# Patient Record
Sex: FEMALE
[De-identification: names, ages, dates, MRNs, and addresses within clinical notes are randomized per-mention and may not be internally consistent; named-entity substitution may affect disease eponyms.]

---

## 2017-11-24 ENCOUNTER — APPOINTMENT (OUTPATIENT)
Dept: ELECTROPHYSIOLOGY | Facility: CLINIC | Age: 82
End: 2017-11-24
Payer: MEDICARE

## 2017-11-24 ENCOUNTER — NON-APPOINTMENT (OUTPATIENT)
Age: 82
End: 2017-11-24

## 2017-11-24 VITALS
DIASTOLIC BLOOD PRESSURE: 82 MMHG | HEART RATE: 66 BPM | BODY MASS INDEX: 22.08 KG/M2 | HEIGHT: 62 IN | OXYGEN SATURATION: 98 % | WEIGHT: 120 LBS | SYSTOLIC BLOOD PRESSURE: 178 MMHG

## 2017-11-24 DIAGNOSIS — Z86.69 PERSONAL HISTORY OF OTHER DISEASES OF THE NERVOUS SYSTEM AND SENSE ORGANS: ICD-10-CM

## 2017-11-24 DIAGNOSIS — I35.0 NONRHEUMATIC AORTIC (VALVE) STENOSIS: ICD-10-CM

## 2017-11-24 DIAGNOSIS — I25.10 ATHEROSCLEROTIC HEART DISEASE OF NATIVE CORONARY ARTERY W/OUT ANGINA PECTORIS: ICD-10-CM

## 2017-11-24 DIAGNOSIS — I10 ESSENTIAL (PRIMARY) HYPERTENSION: ICD-10-CM

## 2017-11-24 DIAGNOSIS — Z82.49 FAMILY HISTORY OF ISCHEMIC HEART DISEASE AND OTHER DISEASES OF THE CIRCULATORY SYSTEM: ICD-10-CM

## 2017-11-24 DIAGNOSIS — R00.2 PALPITATIONS: ICD-10-CM

## 2017-11-24 DIAGNOSIS — E78.00 PURE HYPERCHOLESTEROLEMIA, UNSPECIFIED: ICD-10-CM

## 2017-11-24 DIAGNOSIS — E03.9 HYPOTHYROIDISM, UNSPECIFIED: ICD-10-CM

## 2017-11-24 DIAGNOSIS — Z78.9 OTHER SPECIFIED HEALTH STATUS: ICD-10-CM

## 2017-11-24 DIAGNOSIS — I07.1 RHEUMATIC TRICUSPID INSUFFICIENCY: ICD-10-CM

## 2017-11-24 DIAGNOSIS — I34.1 NONRHEUMATIC MITRAL (VALVE) PROLAPSE: ICD-10-CM

## 2017-11-24 DIAGNOSIS — I48.91 UNSPECIFIED ATRIAL FIBRILLATION: ICD-10-CM

## 2017-11-24 PROCEDURE — 99205 OFFICE O/P NEW HI 60 MIN: CPT

## 2017-11-24 PROCEDURE — 93000 ELECTROCARDIOGRAM COMPLETE: CPT

## 2017-11-24 RX ORDER — EZETIMIBE 10 MG/1
10 TABLET ORAL
Qty: 90 | Refills: 0 | Status: ACTIVE | COMMUNITY
Start: 2017-06-13

## 2017-11-24 RX ORDER — SERTRALINE HYDROCHLORIDE 100 MG/1
100 TABLET, FILM COATED ORAL
Qty: 90 | Refills: 0 | Status: ACTIVE | COMMUNITY
Start: 2017-08-14

## 2017-11-24 RX ORDER — ATORVASTATIN CALCIUM 40 MG/1
40 TABLET, FILM COATED ORAL
Qty: 90 | Refills: 0 | Status: ACTIVE | COMMUNITY
Start: 2017-07-12

## 2017-11-24 RX ORDER — ALPRAZOLAM 0.25 MG/1
0.25 TABLET ORAL
Qty: 60 | Refills: 0 | Status: ACTIVE | COMMUNITY
Start: 2017-08-14

## 2017-11-24 RX ORDER — VALSARTAN AND HYDROCHLOROTHIAZIDE 160; 12.5 MG/1; MG/1
160-12.5 TABLET, FILM COATED ORAL
Qty: 90 | Refills: 0 | Status: ACTIVE | COMMUNITY
Start: 2017-03-20

## 2017-11-24 RX ORDER — LEVOTHYROXINE SODIUM 0.07 MG/1
75 TABLET ORAL
Qty: 90 | Refills: 0 | Status: ACTIVE | COMMUNITY
Start: 2017-10-09

## 2017-11-24 RX ORDER — RIVAROXABAN 20 MG/1
20 TABLET, FILM COATED ORAL
Qty: 90 | Refills: 0 | Status: ACTIVE | COMMUNITY
Start: 2017-11-06

## 2017-11-24 RX ORDER — AMIODARONE HYDROCHLORIDE 200 MG/1
200 TABLET ORAL
Qty: 68 | Refills: 0 | Status: ACTIVE | COMMUNITY
Start: 2017-02-23

## 2017-11-24 RX ORDER — METOPROLOL SUCCINATE 25 MG/1
25 TABLET, EXTENDED RELEASE ORAL DAILY
Qty: 45 | Refills: 0 | Status: ACTIVE | COMMUNITY
Start: 2017-07-12

## 2018-07-27 PROBLEM — Z78.9 ALCOHOL USE: Status: ACTIVE | Noted: 2017-11-24

## 2024-05-11 ENCOUNTER — TRANSCRIPTION ENCOUNTER (OUTPATIENT)
Age: 89
End: 2024-05-11

## 2024-07-08 ENCOUNTER — INPATIENT (INPATIENT)
Facility: HOSPITAL | Age: 89
LOS: 6 days | Discharge: NURSING, REHAB OR LONG-TERM CARE | DRG: 179 | End: 2024-07-15
Attending: STUDENT IN AN ORGANIZED HEALTH CARE EDUCATION/TRAINING PROGRAM | Admitting: STUDENT IN AN ORGANIZED HEALTH CARE EDUCATION/TRAINING PROGRAM
Payer: MEDICARE

## 2024-07-08 VITALS
HEART RATE: 74 BPM | HEIGHT: 60 IN | OXYGEN SATURATION: 96 % | DIASTOLIC BLOOD PRESSURE: 75 MMHG | TEMPERATURE: 98 F | RESPIRATION RATE: 20 BRPM | WEIGHT: 100.09 LBS | SYSTOLIC BLOOD PRESSURE: 124 MMHG

## 2024-07-08 LAB
ALBUMIN SERPL ELPH-MCNC: 4.3 G/DL — SIGNIFICANT CHANGE UP (ref 3.3–5)
ALP SERPL-CCNC: 95 U/L — SIGNIFICANT CHANGE UP (ref 40–120)
ALT FLD-CCNC: 13 U/L — SIGNIFICANT CHANGE UP (ref 10–45)
ANION GAP SERPL CALC-SCNC: 14 MMOL/L — SIGNIFICANT CHANGE UP (ref 5–17)
APPEARANCE UR: CLEAR — SIGNIFICANT CHANGE UP
APTT BLD: 35.7 SEC — HIGH (ref 24.5–35.6)
AST SERPL-CCNC: 20 U/L — SIGNIFICANT CHANGE UP (ref 10–40)
BACTERIA # UR AUTO: NEGATIVE /HPF — SIGNIFICANT CHANGE UP
BASE EXCESS BLDV CALC-SCNC: 2.8 MMOL/L — SIGNIFICANT CHANGE UP (ref -2–3)
BASOPHILS # BLD AUTO: 0.02 K/UL — SIGNIFICANT CHANGE UP (ref 0–0.2)
BASOPHILS NFR BLD AUTO: 0.2 % — SIGNIFICANT CHANGE UP (ref 0–2)
BILIRUB SERPL-MCNC: 0.6 MG/DL — SIGNIFICANT CHANGE UP (ref 0.2–1.2)
BILIRUB UR-MCNC: NEGATIVE — SIGNIFICANT CHANGE UP
BUN SERPL-MCNC: 18 MG/DL — SIGNIFICANT CHANGE UP (ref 7–23)
CA-I SERPL-SCNC: 1.21 MMOL/L — SIGNIFICANT CHANGE UP (ref 1.15–1.33)
CALCIUM SERPL-MCNC: 10.1 MG/DL — SIGNIFICANT CHANGE UP (ref 8.4–10.5)
CAST: 0 /LPF — SIGNIFICANT CHANGE UP (ref 0–4)
CHLORIDE BLDV-SCNC: 103 MMOL/L — SIGNIFICANT CHANGE UP (ref 96–108)
CHLORIDE SERPL-SCNC: 101 MMOL/L — SIGNIFICANT CHANGE UP (ref 96–108)
CO2 BLDV-SCNC: 30 MMOL/L — HIGH (ref 22–26)
CO2 SERPL-SCNC: 24 MMOL/L — SIGNIFICANT CHANGE UP (ref 22–31)
COLOR SPEC: YELLOW — SIGNIFICANT CHANGE UP
CREAT SERPL-MCNC: 1.06 MG/DL — SIGNIFICANT CHANGE UP (ref 0.5–1.3)
DIFF PNL FLD: NEGATIVE — SIGNIFICANT CHANGE UP
EGFR: 51 ML/MIN/1.73M2 — LOW
EOSINOPHIL # BLD AUTO: 0.01 K/UL — SIGNIFICANT CHANGE UP (ref 0–0.5)
EOSINOPHIL NFR BLD AUTO: 0.1 % — SIGNIFICANT CHANGE UP (ref 0–6)
FLUAV AG NPH QL: SIGNIFICANT CHANGE UP
FLUBV AG NPH QL: SIGNIFICANT CHANGE UP
GAS PNL BLDV: 136 MMOL/L — SIGNIFICANT CHANGE UP (ref 136–145)
GAS PNL BLDV: SIGNIFICANT CHANGE UP
GLUCOSE BLDV-MCNC: 99 MG/DL — SIGNIFICANT CHANGE UP (ref 70–99)
GLUCOSE SERPL-MCNC: 107 MG/DL — HIGH (ref 70–99)
GLUCOSE UR QL: NEGATIVE MG/DL — SIGNIFICANT CHANGE UP
HCO3 BLDV-SCNC: 28 MMOL/L — SIGNIFICANT CHANGE UP (ref 22–29)
HCT VFR BLD CALC: 39.8 % — SIGNIFICANT CHANGE UP (ref 34.5–45)
HCT VFR BLDA CALC: 39 % — SIGNIFICANT CHANGE UP (ref 34.5–46.5)
HGB BLD CALC-MCNC: 13.1 G/DL — SIGNIFICANT CHANGE UP (ref 11.7–16.1)
HGB BLD-MCNC: 12.8 G/DL — SIGNIFICANT CHANGE UP (ref 11.5–15.5)
IMM GRANULOCYTES NFR BLD AUTO: 0.5 % — SIGNIFICANT CHANGE UP (ref 0–0.9)
INR BLD: 1.09 RATIO — SIGNIFICANT CHANGE UP (ref 0.85–1.18)
KETONES UR-MCNC: ABNORMAL MG/DL
LACTATE BLDV-MCNC: 1.1 MMOL/L — SIGNIFICANT CHANGE UP (ref 0.5–2)
LEUKOCYTE ESTERASE UR-ACNC: ABNORMAL
LYMPHOCYTES # BLD AUTO: 0.57 K/UL — LOW (ref 1–3.3)
LYMPHOCYTES # BLD AUTO: 6.9 % — LOW (ref 13–44)
MCHC RBC-ENTMCNC: 32 PG — SIGNIFICANT CHANGE UP (ref 27–34)
MCHC RBC-ENTMCNC: 32.2 GM/DL — SIGNIFICANT CHANGE UP (ref 32–36)
MCV RBC AUTO: 99.5 FL — SIGNIFICANT CHANGE UP (ref 80–100)
MONOCYTES # BLD AUTO: 0.51 K/UL — SIGNIFICANT CHANGE UP (ref 0–0.9)
MONOCYTES NFR BLD AUTO: 6.2 % — SIGNIFICANT CHANGE UP (ref 2–14)
NEUTROPHILS # BLD AUTO: 7.06 K/UL — SIGNIFICANT CHANGE UP (ref 1.8–7.4)
NEUTROPHILS NFR BLD AUTO: 86.1 % — HIGH (ref 43–77)
NITRITE UR-MCNC: NEGATIVE — SIGNIFICANT CHANGE UP
NRBC # BLD: 0 /100 WBCS — SIGNIFICANT CHANGE UP (ref 0–0)
PCO2 BLDV: 47 MMHG — HIGH (ref 39–42)
PH BLDV: 7.39 — SIGNIFICANT CHANGE UP (ref 7.32–7.43)
PH UR: 6 — SIGNIFICANT CHANGE UP (ref 5–8)
PLATELET # BLD AUTO: 123 K/UL — LOW (ref 150–400)
PO2 BLDV: 26 MMHG — SIGNIFICANT CHANGE UP (ref 25–45)
POTASSIUM BLDV-SCNC: 4.2 MMOL/L — SIGNIFICANT CHANGE UP (ref 3.5–5.1)
POTASSIUM SERPL-MCNC: 4.1 MMOL/L — SIGNIFICANT CHANGE UP (ref 3.5–5.3)
POTASSIUM SERPL-SCNC: 4.1 MMOL/L — SIGNIFICANT CHANGE UP (ref 3.5–5.3)
PROT SERPL-MCNC: 7.6 G/DL — SIGNIFICANT CHANGE UP (ref 6–8.3)
PROT UR-MCNC: SIGNIFICANT CHANGE UP MG/DL
PROTHROM AB SERPL-ACNC: 11.4 SEC — SIGNIFICANT CHANGE UP (ref 9.5–13)
RBC # BLD: 4 M/UL — SIGNIFICANT CHANGE UP (ref 3.8–5.2)
RBC # FLD: 12 % — SIGNIFICANT CHANGE UP (ref 10.3–14.5)
RBC CASTS # UR COMP ASSIST: 13 /HPF — HIGH (ref 0–4)
REVIEW: SIGNIFICANT CHANGE UP
RSV RNA NPH QL NAA+NON-PROBE: SIGNIFICANT CHANGE UP
SAO2 % BLDV: 39.2 % — LOW (ref 67–88)
SARS-COV-2 RNA SPEC QL NAA+PROBE: DETECTED
SODIUM SERPL-SCNC: 139 MMOL/L — SIGNIFICANT CHANGE UP (ref 135–145)
SP GR SPEC: 1.03 — SIGNIFICANT CHANGE UP (ref 1–1.03)
SQUAMOUS # UR AUTO: 2 /HPF — SIGNIFICANT CHANGE UP (ref 0–5)
UROBILINOGEN FLD QL: 0.2 MG/DL — SIGNIFICANT CHANGE UP (ref 0.2–1)
WBC # BLD: 8.21 K/UL — SIGNIFICANT CHANGE UP (ref 3.8–10.5)
WBC # FLD AUTO: 8.21 K/UL — SIGNIFICANT CHANGE UP (ref 3.8–10.5)
WBC UR QL: 3 /HPF — SIGNIFICANT CHANGE UP (ref 0–5)

## 2024-07-08 RX ORDER — PIPERACILLIN SODIUM AND TAZOBACTAM SODIUM 3; .375 G/15ML; G/15ML
3.38 INJECTION, POWDER, LYOPHILIZED, FOR SOLUTION INTRAVENOUS ONCE
Refills: 0 | Status: COMPLETED | OUTPATIENT
Start: 2024-07-08 | End: 2024-07-08

## 2024-07-08 RX ORDER — REMDESIVIR 5 MG/ML
100 INJECTION INTRAVENOUS EVERY 24 HOURS
Refills: 0 | Status: COMPLETED | OUTPATIENT
Start: 2024-07-10 | End: 2024-07-13

## 2024-07-08 RX ORDER — SODIUM CHLORIDE 0.9 % (FLUSH) 0.9 %
500 SYRINGE (ML) INJECTION ONCE
Refills: 0 | Status: COMPLETED | OUTPATIENT
Start: 2024-07-08 | End: 2024-07-08

## 2024-07-08 RX ORDER — REMDESIVIR 5 MG/ML
200 INJECTION INTRAVENOUS EVERY 24 HOURS
Refills: 0 | Status: COMPLETED | OUTPATIENT
Start: 2024-07-09 | End: 2024-07-09

## 2024-07-08 RX ORDER — ACETAMINOPHEN 325 MG
675 TABLET ORAL ONCE
Refills: 0 | Status: COMPLETED | OUTPATIENT
Start: 2024-07-08 | End: 2024-07-08

## 2024-07-08 RX ORDER — VANCOMYCIN HYDROCHLORIDE 50 MG/ML
1000 KIT ORAL ONCE
Refills: 0 | Status: COMPLETED | OUTPATIENT
Start: 2024-07-08 | End: 2024-07-08

## 2024-07-08 RX ORDER — REMDESIVIR 5 MG/ML
INJECTION INTRAVENOUS
Refills: 0 | Status: COMPLETED | OUTPATIENT
Start: 2024-07-09 | End: 2024-07-13

## 2024-07-08 RX ADMIN — Medication 500 MILLILITER(S): at 20:52

## 2024-07-08 RX ADMIN — Medication 270 MILLIGRAM(S): at 21:10

## 2024-07-08 RX ADMIN — PIPERACILLIN SODIUM AND TAZOBACTAM SODIUM 200 GRAM(S): 3; .375 INJECTION, POWDER, LYOPHILIZED, FOR SOLUTION INTRAVENOUS at 20:52

## 2024-07-08 RX ADMIN — VANCOMYCIN HYDROCHLORIDE 250 MILLIGRAM(S): KIT at 21:54

## 2024-07-08 NOTE — ED PROVIDER NOTE - ATTENDING CONTRIBUTION TO CARE
Attending MD Pisano:  I have seen and examined this patient and fully participated in the care of this patient as the teaching attending. I personally made/approved the management plan and take responsibility for the patient management.      89-year-old woman with a history of cognitive dysfunction, A-fib on Xarelto, CHF on furosemide, hypertension presenting from the [facility] living [facility] for evaluation of altered mental status and vomiting.  History is limited by patient's altered mental status.  Patient was first seen with granddaughter at bedside who states on a good day patient can have a conversation but has have some cognitive dysfunction, she states her grandmother is quite altered currently.  Remainder of history unobtainable from patient due to altered mental status.    Patient's vital signs are notable for rectal temperature 102 otherwise nonactionable.  Room air saturation off of oxygen 94%, patient opens eyes to voice and follows some commands but then closes her eyes and goes back to sleep.  She does not follow commands otherwise.  Head is atraumatic.  Occasional rales in the left anterior chest wall extremities are slightly cool to the touch.  Distal pulses palpable.  The abdomen is soft nondistended, no obvious evident focal tenderness to palpation.  Patient moves all extremity spontaneously but does not participate in nuanced neurologic exam.  Pupils are symmetric 4 mm to 2 mm bilaterally.    Patient is presenting for evaluation of altered mental status found to be febrile, differential includes sepsis from urinary infection or respiratory source.  Plan for panculture urinalysis chest x-ray, given anticoagulated will also obtain screening CT head to exclude IPH.      *The above represents an initial assessment/impression. Please refer to progress notes for potential changes in patient clinical course*

## 2024-07-08 NOTE — ED PROVIDER NOTE - CLINICAL SUMMARY MEDICAL DECISION MAKING FREE TEXT BOX
89-year-old female past medical history of cognitive function, A-fib on Xarelto, CHF with furosemide, currently being on the [place] since this past Wednesday presenting to the emergency department due to altered mental status.  Per daughter the patient has waxing waning mental status but is usually conversational.  Daughter states the patient is responsive to her name, but her cognitive function is decreased from previous.  She was noted to have a fever of 102 in the [place] and an oxygen saturation of 94% for which she was placed on oxygen.  Patient also had 1 episode of vomiting.  Denies cough, congestion, diarrhea, blood in stool.    On exam the patient will open her eyes to name and has crackles to lower lobes bilaterally.  No abdominal tenderness to palpation.  Concerns for sepsis.  Will order septic workup to evaluate for any signs of URI, pneumonia, or UTI.

## 2024-07-08 NOTE — ED ADULT NURSE NOTE - NSFALLHARMRISKINTERV_ED_ALL_ED
Assistance OOB with selected safe patient handling equipment if applicable/Assistance with ambulation/Communicate risk of Fall with Harm to all staff, patient, and family/Monitor gait and stability/Monitor for mental status changes and reorient to person, place, and time, as needed/Provide visual cue: red socks, yellow wristband, yellow gown, etc/Reinforce activity limits and safety measures with patient and family/Toileting schedule using arm’s reach rule for commode and bathroom/Use of alarms - bed, stretcher, chair and/or video monitoring/Bed in lowest position, wheels locked, appropriate side rails in place/Call bell, personal items and telephone in reach/Instruct patient to call for assistance before getting out of bed/chair/stretcher/Non-slip footwear applied when patient is off stretcher/[place] to call system/Physically safe environment - no spills, clutter or unnecessary equipment/Purposeful Proactive Rounding/Room/bathroom lighting operational, light cord in reach

## 2024-07-08 NOTE — ED PROVIDER NOTE - PHYSICAL EXAMINATION
Zac Cardona MD (PGY2)   Physical Exam:    Gen: NAD, Opens eyes to name  Head: NCAT  Lung: crakles to the lower lobes bilaterally  CV: RRR, no murmurs, rubs or gallops  Abd: soft, NT, ND, no guarding, no rigidity, no rebound tenderness, no CVA tenderness   MSK: no visible deformities, ROM normal in UE/LE, no back pain  Neuro: No focal sensory or motor deficits  Skin: Warm, well perfused, no rash, no leg swelling  Psych: normal affect, calm

## 2024-07-08 NOTE — ED ADULT NURSE NOTE - OBJECTIVE STATEMENT
89y/o female presents to ED via EMS, c/o N/V. Per family at bedside, EMS reported patient had episode of nausea and vomiting today at nursing home. Also was found to have low BP and low oxygen saturation. RN did not speak with EMS who left prior to RN entering patient's room. On exam, patient disoriented, moaning in bed. Unable to answer questions or provide medical history. Patient with bruising noted to right wrist. Found to be febrile to 102F rectally. Patient changed into gown and IV placed. Placed on CM. EKG done. MD Pisano at bedside for eval

## 2024-07-08 NOTE — ED PROVIDER NOTE - OTHER FINDINGS
ECG recorded at 8:29 PM independently interpreted by me , Dr Warren Pisano,  at 8:44 PM shows atrial fibrillation borderline right axis deviation right bundle branch block T wave inversions lead II lead III lead aVF, no ST elevation, no ST depression.  No acute diagnostic ischemic findings no prior ECGs for comparison.

## 2024-07-08 NOTE — ED PROVIDER NOTE - OBJECTIVE STATEMENT
89-year-old female past medical history of cognitive function, A-fib on Xarelto, CHF with furosemide, currently being on the [place] since this past Wednesday presenting to the emergency department due to altered mental status.  Per daughter the patient has waxing waning mental status but is usually conversational.  Daughter states the patient is responsive to her name, but her cognitive function is decreased from previous.  She was noted to have a fever of 102 in the [place] and an oxygen saturation of 94% for which she was placed on oxygen.  Patient also had 1 episode of vomiting.  Denies cough, congestion, diarrhea, blood in stool.

## 2024-07-08 NOTE — ED ADULT NURSE REASSESSMENT NOTE - NS ED NURSE REASSESS COMMENT FT1
Patient straight cathed using sterile technique. Second RN present to confirm sterility. Patient tolerated procedure well. Output of approx. 100cc's urine. Sterile specimens collected and sent to lab.

## 2024-07-09 ENCOUNTER — TRANSCRIPTION ENCOUNTER (OUTPATIENT)
Age: 89
End: 2024-07-09

## 2024-07-09 DIAGNOSIS — I50.42 CHRONIC COMBINED SYSTOLIC (CONGESTIVE) AND DIASTOLIC (CONGESTIVE) HEART FAILURE: ICD-10-CM

## 2024-07-09 DIAGNOSIS — E78.5 HYPERLIPIDEMIA, UNSPECIFIED: ICD-10-CM

## 2024-07-09 DIAGNOSIS — E03.9 HYPOTHYROIDISM, UNSPECIFIED: ICD-10-CM

## 2024-07-09 DIAGNOSIS — F39 UNSPECIFIED MOOD [AFFECTIVE] DISORDER: ICD-10-CM

## 2024-07-09 DIAGNOSIS — U07.1 COVID-19: ICD-10-CM

## 2024-07-09 DIAGNOSIS — I48.0 PAROXYSMAL ATRIAL FIBRILLATION: ICD-10-CM

## 2024-07-09 DIAGNOSIS — I10 ESSENTIAL (PRIMARY) HYPERTENSION: ICD-10-CM

## 2024-07-09 DIAGNOSIS — G92.8 OTHER TOXIC ENCEPHALOPATHY: ICD-10-CM

## 2024-07-09 DIAGNOSIS — I35.0 NONRHEUMATIC AORTIC (VALVE) STENOSIS: ICD-10-CM

## 2024-07-09 DIAGNOSIS — R41.89 OTHER SYMPTOMS AND SIGNS INVOLVING COGNITIVE FUNCTIONS AND AWARENESS: ICD-10-CM

## 2024-07-09 LAB
ALBUMIN SERPL ELPH-MCNC: 3.6 G/DL — SIGNIFICANT CHANGE UP (ref 3.3–5)
ALP SERPL-CCNC: 76 U/L — SIGNIFICANT CHANGE UP (ref 40–120)
ALT FLD-CCNC: 13 U/L — SIGNIFICANT CHANGE UP (ref 10–45)
AST SERPL-CCNC: 20 U/L — SIGNIFICANT CHANGE UP (ref 10–40)
BILIRUB DIRECT SERPL-MCNC: 0.1 MG/DL — SIGNIFICANT CHANGE UP (ref 0–0.3)
BILIRUB INDIRECT FLD-MCNC: 0.4 MG/DL — SIGNIFICANT CHANGE UP (ref 0.2–1)
BILIRUB SERPL-MCNC: 0.5 MG/DL — SIGNIFICANT CHANGE UP (ref 0.2–1.2)
PROT SERPL-MCNC: 6.4 G/DL — SIGNIFICANT CHANGE UP (ref 6–8.3)

## 2024-07-09 RX ORDER — CEFTRIAXONE SODIUM 500 MG
1000 VIAL (EA) INJECTION EVERY 24 HOURS
Refills: 0 | Status: DISCONTINUED | OUTPATIENT
Start: 2024-07-09 | End: 2024-07-09

## 2024-07-09 RX ORDER — ACETAMINOPHEN 325 MG
650 TABLET ORAL EVERY 6 HOURS
Refills: 0 | Status: DISCONTINUED | OUTPATIENT
Start: 2024-07-09 | End: 2024-07-15

## 2024-07-09 RX ORDER — ONDANSETRON HYDROCHLORIDE 2 MG/ML
4 INJECTION INTRAMUSCULAR; INTRAVENOUS EVERY 8 HOURS
Refills: 0 | Status: DISCONTINUED | OUTPATIENT
Start: 2024-07-09 | End: 2024-07-15

## 2024-07-09 RX ORDER — MAGNESIUM, ALUMINUM HYDROXIDE 400-400
30 TABLET,CHEWABLE ORAL EVERY 4 HOURS
Refills: 0 | Status: DISCONTINUED | OUTPATIENT
Start: 2024-07-09 | End: 2024-07-15

## 2024-07-09 RX ORDER — CLOPIDOGREL BISULFATE 75 MG/1
75 TABLET, FILM COATED ORAL DAILY
Refills: 0 | Status: DISCONTINUED | OUTPATIENT
Start: 2024-07-09 | End: 2024-07-15

## 2024-07-09 RX ORDER — SERTRALINE HYDROCHLORIDE 100 MG/1
150 TABLET, FILM COATED ORAL DAILY
Refills: 0 | Status: DISCONTINUED | OUTPATIENT
Start: 2024-07-09 | End: 2024-07-15

## 2024-07-09 RX ORDER — ACETAMINOPHEN 325 MG
725 TABLET ORAL ONCE
Refills: 0 | Status: COMPLETED | OUTPATIENT
Start: 2024-07-09 | End: 2024-07-09

## 2024-07-09 RX ORDER — GUAIFENESIN/DEXTROMETHORPHAN 100-10MG/5
10 LIQUID (ML) ORAL EVERY 4 HOURS
Refills: 0 | Status: DISCONTINUED | OUTPATIENT
Start: 2024-07-09 | End: 2024-07-15

## 2024-07-09 RX ORDER — CEFTRIAXONE SODIUM 500 MG
1000 VIAL (EA) INJECTION EVERY 24 HOURS
Refills: 0 | Status: DISCONTINUED | OUTPATIENT
Start: 2024-07-09 | End: 2024-07-12

## 2024-07-09 RX ORDER — IPRATROPIUM BROMIDE AND ALBUTEROL SULFATE .5; 3 MG/3ML; MG/3ML
3 SOLUTION RESPIRATORY (INHALATION) EVERY 6 HOURS
Refills: 0 | Status: DISCONTINUED | OUTPATIENT
Start: 2024-07-09 | End: 2024-07-15

## 2024-07-09 RX ORDER — ENOXAPARIN SODIUM 100 MG/ML
30 INJECTION SUBCUTANEOUS EVERY 24 HOURS
Refills: 0 | Status: DISCONTINUED | OUTPATIENT
Start: 2024-07-09 | End: 2024-07-09

## 2024-07-09 RX ORDER — AZITHROMYCIN 250 MG/1
500 TABLET, FILM COATED ORAL EVERY 24 HOURS
Refills: 0 | Status: DISCONTINUED | OUTPATIENT
Start: 2024-07-10 | End: 2024-07-12

## 2024-07-09 RX ORDER — SACUBITRIL AND VALSARTAN 97; 103 MG/1; MG/1
1 TABLET, FILM COATED ORAL
Refills: 0 | Status: DISCONTINUED | OUTPATIENT
Start: 2024-07-09 | End: 2024-07-15

## 2024-07-09 RX ORDER — LORAZEPAM 0.5 MG
0.5 TABLET ORAL ONCE
Refills: 0 | Status: DISCONTINUED | OUTPATIENT
Start: 2024-07-09 | End: 2024-07-09

## 2024-07-09 RX ORDER — TRAZODONE HYDROCHLORIDE 50 MG/1
150 TABLET, FILM COATED ORAL AT BEDTIME
Refills: 0 | Status: DISCONTINUED | OUTPATIENT
Start: 2024-07-09 | End: 2024-07-15

## 2024-07-09 RX ORDER — FUROSEMIDE 10 MG/ML
20 INJECTION, SOLUTION INTRAMUSCULAR; INTRAVENOUS DAILY
Refills: 0 | Status: DISCONTINUED | OUTPATIENT
Start: 2024-07-09 | End: 2024-07-09

## 2024-07-09 RX ORDER — LEVOTHYROXINE SODIUM 25 MCG
88 TABLET ORAL DAILY
Refills: 0 | Status: DISCONTINUED | OUTPATIENT
Start: 2024-07-09 | End: 2024-07-15

## 2024-07-09 RX ORDER — SERTRALINE HYDROCHLORIDE 100 MG/1
150 TABLET, FILM COATED ORAL DAILY
Refills: 0 | Status: DISCONTINUED | OUTPATIENT
Start: 2024-07-09 | End: 2024-07-09

## 2024-07-09 RX ORDER — AZITHROMYCIN 250 MG/1
500 TABLET, FILM COATED ORAL ONCE
Refills: 0 | Status: COMPLETED | OUTPATIENT
Start: 2024-07-09 | End: 2024-07-09

## 2024-07-09 RX ORDER — ATORVASTATIN CALCIUM 20 MG/1
40 TABLET, FILM COATED ORAL AT BEDTIME
Refills: 0 | Status: DISCONTINUED | OUTPATIENT
Start: 2024-07-09 | End: 2024-07-15

## 2024-07-09 RX ORDER — METOPROLOL TARTRATE 50 MG
50 TABLET ORAL DAILY
Refills: 0 | Status: DISCONTINUED | OUTPATIENT
Start: 2024-07-09 | End: 2024-07-15

## 2024-07-09 RX ORDER — AZITHROMYCIN 250 MG/1
TABLET, FILM COATED ORAL
Refills: 0 | Status: DISCONTINUED | OUTPATIENT
Start: 2024-07-09 | End: 2024-07-12

## 2024-07-09 RX ORDER — RIVAROXABAN 10 MG/1
15 TABLET, FILM COATED ORAL DAILY
Refills: 0 | Status: DISCONTINUED | OUTPATIENT
Start: 2024-07-09 | End: 2024-07-15

## 2024-07-09 RX ORDER — BENZONATATE 100 MG/1
100 TABLET ORAL EVERY 8 HOURS
Refills: 0 | Status: DISCONTINUED | OUTPATIENT
Start: 2024-07-09 | End: 2024-07-15

## 2024-07-09 RX ADMIN — ATORVASTATIN CALCIUM 40 MILLIGRAM(S): 20 TABLET, FILM COATED ORAL at 21:50

## 2024-07-09 RX ADMIN — Medication 0.5 MILLIGRAM(S): at 01:46

## 2024-07-09 RX ADMIN — SACUBITRIL AND VALSARTAN 1 TABLET(S): 97; 103 TABLET, FILM COATED ORAL at 18:10

## 2024-07-09 RX ADMIN — Medication 12.5 MILLIGRAM(S): at 13:59

## 2024-07-09 RX ADMIN — Medication 650 MILLIGRAM(S): at 19:28

## 2024-07-09 RX ADMIN — RIVAROXABAN 15 MILLIGRAM(S): 10 TABLET, FILM COATED ORAL at 13:59

## 2024-07-09 RX ADMIN — SERTRALINE HYDROCHLORIDE 150 MILLIGRAM(S): 100 TABLET, FILM COATED ORAL at 18:10

## 2024-07-09 RX ADMIN — Medication 650 MILLIGRAM(S): at 18:09

## 2024-07-09 RX ADMIN — Medication 3 MILLIGRAM(S): at 21:50

## 2024-07-09 RX ADMIN — AZITHROMYCIN 500 MILLIGRAM(S): 250 TABLET, FILM COATED ORAL at 14:32

## 2024-07-09 RX ADMIN — Medication 50 MILLIGRAM(S): at 13:59

## 2024-07-09 RX ADMIN — Medication 25 MILLIGRAM(S): at 01:29

## 2024-07-09 RX ADMIN — Medication 290 MILLIGRAM(S): at 06:30

## 2024-07-09 RX ADMIN — REMDESIVIR 200 MILLIGRAM(S): 5 INJECTION INTRAVENOUS at 11:27

## 2024-07-09 RX ADMIN — TRAZODONE HYDROCHLORIDE 150 MILLIGRAM(S): 50 TABLET, FILM COATED ORAL at 21:50

## 2024-07-09 RX ADMIN — CLOPIDOGREL BISULFATE 75 MILLIGRAM(S): 75 TABLET, FILM COATED ORAL at 13:59

## 2024-07-09 RX ADMIN — Medication 100 MILLIGRAM(S): at 18:15

## 2024-07-09 RX ADMIN — Medication 725 MILLIGRAM(S): at 07:22

## 2024-07-09 RX ADMIN — IPRATROPIUM BROMIDE AND ALBUTEROL SULFATE 3 MILLILITER(S): .5; 3 SOLUTION RESPIRATORY (INHALATION) at 18:11

## 2024-07-09 RX ADMIN — FUROSEMIDE 20 MILLIGRAM(S): 10 INJECTION, SOLUTION INTRAMUSCULAR; INTRAVENOUS at 18:10

## 2024-07-09 NOTE — H&P ADULT - HISTORY OF PRESENT ILLNESS
Lisa Vela is an 89 y/o F with PMH significant for CHF, AS s/p TAVR (about 2 years ago) on plavix, paroxysmal Afib/aflutter on xarelto, HTN, HLD, hypothyroidism, and cognitive decline presenting with AMS. Patient recently moved into the [facility] on 7/3. Yesterday, care staff noted patient to be more lethargic and less responsive than normal accompanied by cough and vomiting. Patient's daughter denies any fevers, chills, abdominal pain, urinary incontinence, or new FND.Patient denies any headache, chest pain, or abdominal pain.  At baseline patient is conversive and knows her name/where she is. She is able to ambulate with a walker. Patient has been compliant with all home medications per aid at bedside.

## 2024-07-09 NOTE — PHYSICAL THERAPY INITIAL EVALUATION ADULT - BALANCE TRAINING, PT EVAL
GOAL: pt will increase standing dynamic/static balance to good minus in order to improve safety with functional mobility in 2 weeks

## 2024-07-09 NOTE — H&P ADULT - NSICDXPASTMEDICALHX_GEN_ALL_CORE_FT
PAST MEDICAL HISTORY:  Aortic stenosis     Chronic combined systolic and diastolic heart failure     Chronic mood disorder     Cognitive decline     Hyperlipidemia     Hypertension     Hypothyroidism     Paroxysmal atrial fibrillation     Recurrent UTI

## 2024-07-09 NOTE — PROVIDER CONTACT NOTE (OTHER) - BACKGROUND
pt admitted for AMS. dx w COVID on remdesevir. currently being txt with azithromycin and ceftriaxone for PNA.

## 2024-07-09 NOTE — PROVIDER CONTACT NOTE (SEPSIS SCREENING) - SEPSIS CRITERIA TO COINCIDE WITH MEWS
Temperature less than 96.8 or greater than 100.4/Acute Altered Mental Status
Heart Rate greater than 90/Temperature less than 96.8 or greater than 100.4

## 2024-07-09 NOTE — H&P ADULT - PROBLEM SELECTOR PLAN 5
- Unsure of baseline EF. No evidence of acute exacerbation at this time.   - GDMT: Continue home Entresto and metoprolol  - Diuresis with home lasix 20 mg PO qd

## 2024-07-09 NOTE — PROVIDER CONTACT NOTE (SEPSIS SCREENING) - BACKGROUND:
pt has cognitive decline, blood & urine cultures collected, pending results
pt admitted for AMS. pt currently had COVID on remdesevir and being txt for PNA with azithromycin and ceftriaxone

## 2024-07-09 NOTE — H&P ADULT - PROBLEM SELECTOR PLAN 2
- Started on RDV, first dose today  - s/p vanc/zosyn in ED. Possible PNA on CXR, continue empiric anitibiotic therapy with azithromycin/rocephin  - Supplemental O2 therapy via NC, consider 6MWT once ambulatory  - Antitussives and duoneb PRN

## 2024-07-09 NOTE — PHYSICAL THERAPY INITIAL EVALUATION ADULT - PERTINENT HX OF CURRENT PROBLEM, REHAB EVAL
89-year-old female past medical history of cognitive function, A-fib on Xarelto, CHF with furosemide, currently being on the [place] since this past Wednesday presenting to the emergency department due to altered mental status.  Per daughter the patient has waxing waning mental status but is usually conversational.  Daughter states the patient is responsive to her name, but her cognitive function is decreased from previous.  She was noted to have a fever of 102 in the [place] and an oxygen saturation of 94% for which she was placed on oxygen.  Patient also had 1 episode of vomiting. 	Covid-19 positive, c/w RDV on 2L., AMS CTH -ve for any acute abnormalities

## 2024-07-09 NOTE — H&P ADULT - TIME BILLING
- Reviewing, and interpreting labs and testing.  - Independently obtaining a review of systems and performing a physical exam  - Reviewing consultant documentation/recommendations in addition to discussing plan of care with consultants.  - Counselling and educating patient and family regarding interpretation of aforementioned items and plan of care.

## 2024-07-09 NOTE — PATIENT PROFILE ADULT - NSPRESCRALCFREQ_GEN_A_NUR
Additional Area 5 Units: 0 Show Additional Area 2: Yes Post-Care Instructions: Patient instructed to not lie down for 4 hours and limit physical activity for 24 hours. Additional Area 3 Location: Nasalis Show Right And Left Pupillary Line Units: No Detail Level: Detailed Glabellar Complex Units: 25 Additional Area 4 Location: Chin Forehead Units: 10 Additional Area 1 Location: lip Show Inventory Tab: Show Consent obtained. Risks include but not limited to lid/brow ptosis, bruising, swelling, diplopia, temporary effect, incomplete chemical denervation. Additional Area 2 Location: DLI Never

## 2024-07-09 NOTE — H&P ADULT - PROBLEM SELECTOR PLAN 1
- CTH without acute intracranial abnormalities. No focal deficit on exam.   - Likely 2/2 infection given + COVID 19. Management per below. Check TFT in AM.   - Aspiration and fall precautions  - Passed bedside swallow, OK for diet  - PT eval once mental status improves

## 2024-07-09 NOTE — PATIENT PROFILE ADULT - FUNCTIONAL ASSESSMENT - DAILY ACTIVITY 2.
Shreya Epperson is a 67 year old female presenting with   Chief Complaint   Patient presents with   • Office Visit   • Follow-up     Discuss Chronic Conditions, patient last seen 4/17/2023 for Arthalgia.     Patient would also like to discuss ongoing low back pain.      Patient is accompanied by patient     Denies known Latex allergy or symptoms of Latex sensitivity.    Medications reviewed and updated.    Refills Needed: No    Advance Directives:  discussed and patient declined     Recent PHQ 2/9 Score    PHQ 2:  Date Adult PHQ 2 Score Adult PHQ 2 Interpretation   4/7/2023 0 No further screening needed       Health Maintenance Due   Topic Date Due   • COVID-19 Vaccine (4 - Booster for Pfizer series) 02/15/2022   • Pneumococcal Vaccine 65+ (2 - PCV) 01/12/2023   • Traditional Medicare- Medicare Wellness Visit  11/15/2023       Patient is due for topics as listed above but is not proceeding with Immunization(s) COVID-19 and Pneumococcal at this time.      2 = A lot of assistance

## 2024-07-09 NOTE — H&P ADULT - NSHPREVIEWOFSYSTEMS_GEN_ALL_CORE
Review of Systems:   Limited due to AMS/baseline cognitive decline. Denies any headache, chest pain, or abdominal pain.

## 2024-07-09 NOTE — H&P ADULT - ASSESSMENT
Lisa Vela is an 88 y/o F with PMH significant for CHF, AS s/p TAVR (about 2 years ago) on plavix, paroxysmal Afib/aflutter on xarelto, HTN, HLD, hypothyroidism, and cognitive decline presenting with AMS. Patient recently moved into the [facility] on 7/3. Yesterday, care staff noted patient to be more lethargic and less responsive than normal accompanied by cough and vomiting. Found to have TME 2/2 COVID 19.

## 2024-07-09 NOTE — PHYSICAL THERAPY INITIAL EVALUATION ADULT - ADDITIONAL COMMENTS
Prior to admission patient required assist with functional mobility and ADLs, ambulated with RW. Pt recently placed into Assisted living/memory unit.

## 2024-07-09 NOTE — PROVIDER CONTACT NOTE (SEPSIS SCREENING) - ASSESSMENT:
MEWS Score is 8
Pt A&Ox0, lethargic, had ativan IVP in ER, on remdesivir, HR 77, bp 111/72, on 2L NC O2, O2 sat 98%

## 2024-07-09 NOTE — PATIENT PROFILE ADULT - FALL HARM RISK - HARM RISK INTERVENTIONS
Assistance with ambulation/Assistance OOB with selected safe patient handling equipment/Communicate Risk of Fall with Harm to all staff/Discuss with provider need for PT consult/Monitor gait and stability/Reinforce activity limits and safety measures with patient and family/Tailored Fall Risk Interventions/Visual Cue: Yellow wristband and red socks/Bed in lowest position, wheels locked, appropriate side rails in place/Call bell, personal items and telephone in reach/Instruct patient to call for assistance before getting out of bed or chair/Non-slip footwear when patient is out of bed/[place] to call system/Physically safe environment - no spills, clutter or unnecessary equipment/Purposeful Proactive Rounding/Room/bathroom lighting operational, light cord in reach

## 2024-07-09 NOTE — PHYSICAL THERAPY INITIAL EVALUATION ADULT - BED MOBILITY LIMITATIONS, REHAB EVAL
Unstable angina pectoris
decreased ability to use arms for pushing/pulling/decreased ability to use legs for bridging/pushing

## 2024-07-09 NOTE — PHARMACOTHERAPY INTERVENTION NOTE - COMMENTS
Medication Reconciliation completed for patient.  These were confirmed with patient's facility, the [facility].  Updated medications are reflected in Outpatient Medication Review.     Home Medications:  ascorbic acid 500 mg oral tablet: 1 tab(s) orally once a day   atorvastatin 40 mg oral tablet: 1 tab(s) orally once a day   cholecalciferol 25 mcg (1000 intl units) oral tablet: 1 tab(s) orally once a day   clopidogrel 75 mg oral tablet: 1 tab(s) orally once a day   Entresto 24 mg-26 mg oral tablet: 1 tab(s) orally 2 times a day  ezetimibe 10 mg oral tablet: 1 tab(s) orally once a day   ferrous sulfate (as elemental iron) 45 mg oral tablet, extended release: 1 tab(s) orally once a day   furosemide 20 mg oral tablet: 1 tab(s) orally on M, W, Thurs, Sat, Sun; 2 tab(s) orally on Tues, Fri   levothyroxine 88 mcg (0.088 mg) oral tablet: 1 tab(s) orally once a day  melatonin 10 mg oral tablet: 1 tab(s) orally once a day  methenamine hippurate 1 g oral tablet: 1 tab(s) orally once a day  metoprolol succinate 50 mg oral tablet, extended release: 1 tab(s) orally once a day  Abdi Laxatives Caplets: 2 pills M, W, F   Probiotic Formula (Bacillus Coagulans) oral capsule: 1 cap(s) orally once a day   QUEtiapine 25 mg oral tablet: 0.5 tab(s) orally once a day  rivaroxaban 15 mg oral tablet: 1 tab(s) orally once a day   sertraline 150 mg oral capsule: 1 cap(s) orally once a day  traZODone 150 mg oral tablet: 1 tab(s) orally once a day  Xanax 0.25 mg oral tablet: 1 tab(s) orally once a day      Vida Rae, PharmD, BCPS  Clinical Pharmacy Specialist  Available on Teams

## 2024-07-10 LAB
ALBUMIN SERPL ELPH-MCNC: 3.3 G/DL — SIGNIFICANT CHANGE UP (ref 3.3–5)
ALP SERPL-CCNC: 76 U/L — SIGNIFICANT CHANGE UP (ref 40–120)
ALT FLD-CCNC: 13 U/L — SIGNIFICANT CHANGE UP (ref 10–45)
ANION GAP SERPL CALC-SCNC: 13 MMOL/L — SIGNIFICANT CHANGE UP (ref 5–17)
AST SERPL-CCNC: 24 U/L — SIGNIFICANT CHANGE UP (ref 10–40)
BASOPHILS # BLD AUTO: 0.03 K/UL — SIGNIFICANT CHANGE UP (ref 0–0.2)
BASOPHILS NFR BLD AUTO: 0.5 % — SIGNIFICANT CHANGE UP (ref 0–2)
BILIRUB DIRECT SERPL-MCNC: <0.1 MG/DL — SIGNIFICANT CHANGE UP (ref 0–0.3)
BILIRUB INDIRECT FLD-MCNC: >0.2 MG/DL — SIGNIFICANT CHANGE UP (ref 0.2–1)
BILIRUB SERPL-MCNC: 0.3 MG/DL — SIGNIFICANT CHANGE UP (ref 0.2–1.2)
BUN SERPL-MCNC: 15 MG/DL — SIGNIFICANT CHANGE UP (ref 7–23)
CALCIUM SERPL-MCNC: 9.3 MG/DL — SIGNIFICANT CHANGE UP (ref 8.4–10.5)
CHLORIDE SERPL-SCNC: 105 MMOL/L — SIGNIFICANT CHANGE UP (ref 96–108)
CO2 SERPL-SCNC: 20 MMOL/L — LOW (ref 22–31)
CREAT SERPL-MCNC: 0.79 MG/DL — SIGNIFICANT CHANGE UP (ref 0.5–1.3)
CULTURE RESULTS: NO GROWTH — SIGNIFICANT CHANGE UP
EGFR: 72 ML/MIN/1.73M2 — SIGNIFICANT CHANGE UP
EOSINOPHIL # BLD AUTO: 0.03 K/UL — SIGNIFICANT CHANGE UP (ref 0–0.5)
EOSINOPHIL NFR BLD AUTO: 0.5 % — SIGNIFICANT CHANGE UP (ref 0–6)
GLUCOSE SERPL-MCNC: 105 MG/DL — HIGH (ref 70–99)
HCT VFR BLD CALC: 36.8 % — SIGNIFICANT CHANGE UP (ref 34.5–45)
HGB BLD-MCNC: 11.8 G/DL — SIGNIFICANT CHANGE UP (ref 11.5–15.5)
IMM GRANULOCYTES NFR BLD AUTO: 0.7 % — SIGNIFICANT CHANGE UP (ref 0–0.9)
LYMPHOCYTES # BLD AUTO: 0.69 K/UL — LOW (ref 1–3.3)
LYMPHOCYTES # BLD AUTO: 11.3 % — LOW (ref 13–44)
MCHC RBC-ENTMCNC: 31.7 PG — SIGNIFICANT CHANGE UP (ref 27–34)
MCHC RBC-ENTMCNC: 32.1 GM/DL — SIGNIFICANT CHANGE UP (ref 32–36)
MCV RBC AUTO: 98.9 FL — SIGNIFICANT CHANGE UP (ref 80–100)
MONOCYTES # BLD AUTO: 0.36 K/UL — SIGNIFICANT CHANGE UP (ref 0–0.9)
MONOCYTES NFR BLD AUTO: 5.9 % — SIGNIFICANT CHANGE UP (ref 2–14)
NEUTROPHILS # BLD AUTO: 4.94 K/UL — SIGNIFICANT CHANGE UP (ref 1.8–7.4)
NEUTROPHILS NFR BLD AUTO: 81.1 % — HIGH (ref 43–77)
NRBC # BLD: 0 /100 WBCS — SIGNIFICANT CHANGE UP (ref 0–0)
PLATELET # BLD AUTO: 92 K/UL — LOW (ref 150–400)
POTASSIUM SERPL-MCNC: 3.6 MMOL/L — SIGNIFICANT CHANGE UP (ref 3.5–5.3)
POTASSIUM SERPL-SCNC: 3.6 MMOL/L — SIGNIFICANT CHANGE UP (ref 3.5–5.3)
PROT SERPL-MCNC: 6.2 G/DL — SIGNIFICANT CHANGE UP (ref 6–8.3)
RBC # BLD: 3.72 M/UL — LOW (ref 3.8–5.2)
RBC # FLD: 12 % — SIGNIFICANT CHANGE UP (ref 10.3–14.5)
SODIUM SERPL-SCNC: 138 MMOL/L — SIGNIFICANT CHANGE UP (ref 135–145)
SPECIMEN SOURCE: SIGNIFICANT CHANGE UP
T4 AB SER-ACNC: 4.7 UG/DL — SIGNIFICANT CHANGE UP (ref 4.6–12)
TSH SERPL-MCNC: 3.41 UIU/ML — SIGNIFICANT CHANGE UP (ref 0.27–4.2)
WBC # BLD: 6.09 K/UL — SIGNIFICANT CHANGE UP (ref 3.8–10.5)
WBC # FLD AUTO: 6.09 K/UL — SIGNIFICANT CHANGE UP (ref 3.8–10.5)

## 2024-07-10 RX ADMIN — SACUBITRIL AND VALSARTAN 1 TABLET(S): 97; 103 TABLET, FILM COATED ORAL at 06:37

## 2024-07-10 RX ADMIN — AZITHROMYCIN 255 MILLIGRAM(S): 250 TABLET, FILM COATED ORAL at 12:33

## 2024-07-10 RX ADMIN — IPRATROPIUM BROMIDE AND ALBUTEROL SULFATE 3 MILLILITER(S): .5; 3 SOLUTION RESPIRATORY (INHALATION) at 06:38

## 2024-07-10 RX ADMIN — Medication 12.5 MILLIGRAM(S): at 12:33

## 2024-07-10 RX ADMIN — SACUBITRIL AND VALSARTAN 1 TABLET(S): 97; 103 TABLET, FILM COATED ORAL at 17:08

## 2024-07-10 RX ADMIN — REMDESIVIR 200 MILLIGRAM(S): 5 INJECTION INTRAVENOUS at 11:15

## 2024-07-10 RX ADMIN — CLOPIDOGREL BISULFATE 75 MILLIGRAM(S): 75 TABLET, FILM COATED ORAL at 11:15

## 2024-07-10 RX ADMIN — TRAZODONE HYDROCHLORIDE 150 MILLIGRAM(S): 50 TABLET, FILM COATED ORAL at 21:49

## 2024-07-10 RX ADMIN — Medication 88 MICROGRAM(S): at 06:38

## 2024-07-10 RX ADMIN — Medication 100 MILLIGRAM(S): at 17:11

## 2024-07-10 RX ADMIN — Medication 3 MILLIGRAM(S): at 21:48

## 2024-07-10 RX ADMIN — BENZONATATE 100 MILLIGRAM(S): 100 TABLET ORAL at 11:17

## 2024-07-10 RX ADMIN — IPRATROPIUM BROMIDE AND ALBUTEROL SULFATE 3 MILLILITER(S): .5; 3 SOLUTION RESPIRATORY (INHALATION) at 17:23

## 2024-07-10 RX ADMIN — ATORVASTATIN CALCIUM 40 MILLIGRAM(S): 20 TABLET, FILM COATED ORAL at 21:48

## 2024-07-10 RX ADMIN — IPRATROPIUM BROMIDE AND ALBUTEROL SULFATE 3 MILLILITER(S): .5; 3 SOLUTION RESPIRATORY (INHALATION) at 11:15

## 2024-07-10 RX ADMIN — RIVAROXABAN 15 MILLIGRAM(S): 10 TABLET, FILM COATED ORAL at 11:15

## 2024-07-10 RX ADMIN — SERTRALINE HYDROCHLORIDE 150 MILLIGRAM(S): 100 TABLET, FILM COATED ORAL at 11:16

## 2024-07-10 RX ADMIN — Medication 50 MILLIGRAM(S): at 06:38

## 2024-07-10 NOTE — PROGRESS NOTE ADULT - PROBLEM SELECTOR PLAN 2
- c/w remdesivir, would avoid dexamethasone in the setting of possible bacterial PNA   - s/p vanc/zosyn in ED. Possible PNA on CXR, continue empiric anitibiotic therapy with azithromycin/rocephin. if bcx remain negative, will dc   - Supplemental O2 therapy via NC, consider 6MWT once ambulatory  - Antitussives and duoneb PRN

## 2024-07-10 NOTE — PROGRESS NOTE ADULT - SUBJECTIVE AND OBJECTIVE BOX
Marimar Ro MD  [facility] Division of Hospital Medicine    SUBJECTIVE / OVERNIGHT EVENTS:  - no events overnight, confused this morning, unable to provide any ROS.   MEDICATIONS  (STANDING):  albuterol/ipratropium for Nebulization 3 milliLiter(s) Nebulizer every 6 hours  atorvastatin 40 milliGRAM(s) Oral at bedtime  azithromycin  IVPB      azithromycin  IVPB 500 milliGRAM(s) IV Intermittent every 24 hours  cefTRIAXone   IVPB 1000 milliGRAM(s) IV Intermittent every 24 hours  clopidogrel Tablet 75 milliGRAM(s) Oral daily  levothyroxine 88 MICROGram(s) Oral daily  metoprolol succinate ER 50 milliGRAM(s) Oral daily  QUEtiapine 12.5 milliGRAM(s) Oral daily  remdesivir  IVPB   IV Intermittent   remdesivir  IVPB 100 milliGRAM(s) IV Intermittent every 24 hours  rivaroxaban 15 milliGRAM(s) Oral daily  sacubitril 24 mG/valsartan 26 mG 1 Tablet(s) Oral two times a day  sertraline 150 milliGRAM(s) Oral daily  traZODone 150 milliGRAM(s) Oral at bedtime    MEDICATIONS  (PRN):  acetaminophen     Tablet .. 650 milliGRAM(s) Oral every 6 hours PRN Temp greater or equal to 38C (100.4F), Mild Pain (1 - 3)  aluminum hydroxide/magnesium hydroxide/simethicone Suspension 30 milliLiter(s) Oral every 4 hours PRN Dyspepsia  benzonatate 100 milliGRAM(s) Oral every 8 hours PRN Cough  guaifenesin/dextromethorphan Oral Liquid 10 milliLiter(s) Oral every 4 hours PRN Cough  melatonin 3 milliGRAM(s) Oral at bedtime PRN Insomnia  ondansetron Injectable 4 milliGRAM(s) IV Push every 8 hours PRN Nausea and/or Vomiting      I&O's Summary      PHYSICAL EXAM:  Vital Signs Last 24 Hrs  T(C): 36.4 (10 Jul 2024 08:52), Max: 39.3 (09 Jul 2024 17:46)  T(F): 97.6 (10 Jul 2024 08:52), Max: 102.7 (09 Jul 2024 17:46)  HR: 83 (10 Jul 2024 08:52) (78 - 114)  BP: 106/67 (10 Jul 2024 08:52) (103/62 - 128/72)  BP(mean): --  RR: 18 (10 Jul 2024 08:52) (18 - 18)  SpO2: 92% (10 Jul 2024 08:52) (92% - 98%)    Parameters below as of 10 Jul 2024 08:52  Patient On (Oxygen Delivery Method): nasal cannula  O2 Flow (L/min): 2    CONSTITUTIONAL: NAD, well-developed, well-groomed  ENMT: Moist oral mucosa, no pharyngeal injection or exudates;   RESPIRATORY: Normal respiratory effort; lungs are clear to auscultation bilaterally  CARDIOVASCULAR: Regular rate and rhythm, normal S1 and S2, no murmur/rub/gallop; No lower extremity edema; Peripheral pulses are 2+ bilaterally  ABDOMEN: Nontender to palpation, normoactive bowel sounds, no rebound/guarding;   MUSCULOSKELETAL: no clubbing or cyanosis of digits; no joint swelling or tenderness to palpation  PSYCH: A+O to person, place,   NEUROLOGY:no gross sensory deficits   SKIN: No rashes; no palpable lesions    LABS:                        11.8   6.09  )-----------( 92       ( 10 Jul 2024 10:33 )             36.8     07-10    138  |  105  |  15  ----------------------------<  105<H>  3.6   |  20<L>  |  0.79    Ca    9.3      10 Jul 2024 06:59    TPro  6.2  /  Alb  3.3  /  TBili  0.3  /  DBili  <0.1  /  AST  24  /  ALT  13  /  AlkPhos  76  07-10    PT/INR - ( 08 Jul 2024 20:42 )   PT: 11.4 sec;   INR: 1.09 ratio         PTT - ( 08 Jul 2024 20:42 )  PTT:35.7 sec      Urinalysis Basic - ( 10 Jul 2024 06:59 )    Color: x / Appearance: x / SG: x / pH: x  Gluc: 105 mg/dL / Ketone: x  / Bili: x / Urobili: x   Blood: x / Protein: x / Nitrite: x   Leuk Esterase: x / RBC: x / WBC x   Sq Epi: x / Non Sq Epi: x / Bacteria: x        Culture - Blood (collected 08 Jul 2024 20:35)  Source: .Blood Blood-Peripheral  Preliminary Report (10 Jul 2024 02:04):    No growth at 24 hours    Culture - Blood (collected 08 Jul 2024 20:20)  Source: .Blood Blood-Peripheral  Preliminary Report (10 Jul 2024 02:04):    No growth at 24 hours

## 2024-07-11 LAB
ALBUMIN SERPL ELPH-MCNC: 3 G/DL — LOW (ref 3.3–5)
ALP SERPL-CCNC: 70 U/L — SIGNIFICANT CHANGE UP (ref 40–120)
ALT FLD-CCNC: 11 U/L — SIGNIFICANT CHANGE UP (ref 10–45)
ANION GAP SERPL CALC-SCNC: 11 MMOL/L — SIGNIFICANT CHANGE UP (ref 5–17)
AST SERPL-CCNC: 27 U/L — SIGNIFICANT CHANGE UP (ref 10–40)
BILIRUB DIRECT SERPL-MCNC: <0.1 MG/DL — SIGNIFICANT CHANGE UP (ref 0–0.3)
BILIRUB INDIRECT FLD-MCNC: >0.2 MG/DL — SIGNIFICANT CHANGE UP (ref 0.2–1)
BILIRUB SERPL-MCNC: 0.3 MG/DL — SIGNIFICANT CHANGE UP (ref 0.2–1.2)
BUN SERPL-MCNC: 15 MG/DL — SIGNIFICANT CHANGE UP (ref 7–23)
CALCIUM SERPL-MCNC: 9.3 MG/DL — SIGNIFICANT CHANGE UP (ref 8.4–10.5)
CHLORIDE SERPL-SCNC: 103 MMOL/L — SIGNIFICANT CHANGE UP (ref 96–108)
CO2 SERPL-SCNC: 21 MMOL/L — LOW (ref 22–31)
CREAT SERPL-MCNC: 0.87 MG/DL — SIGNIFICANT CHANGE UP (ref 0.5–1.3)
EGFR: 64 ML/MIN/1.73M2 — SIGNIFICANT CHANGE UP
GLUCOSE SERPL-MCNC: 85 MG/DL — SIGNIFICANT CHANGE UP (ref 70–99)
HCT VFR BLD CALC: 38 % — SIGNIFICANT CHANGE UP (ref 34.5–45)
HGB BLD-MCNC: 12.5 G/DL — SIGNIFICANT CHANGE UP (ref 11.5–15.5)
INR BLD: 1.46 RATIO — HIGH (ref 0.85–1.18)
MAGNESIUM SERPL-MCNC: 2 MG/DL — SIGNIFICANT CHANGE UP (ref 1.6–2.6)
MCHC RBC-ENTMCNC: 32.1 PG — SIGNIFICANT CHANGE UP (ref 27–34)
MCHC RBC-ENTMCNC: 32.9 GM/DL — SIGNIFICANT CHANGE UP (ref 32–36)
MCV RBC AUTO: 97.4 FL — SIGNIFICANT CHANGE UP (ref 80–100)
NRBC # BLD: 0 /100 WBCS — SIGNIFICANT CHANGE UP (ref 0–0)
PHOSPHATE SERPL-MCNC: 2.6 MG/DL — SIGNIFICANT CHANGE UP (ref 2.5–4.5)
PLATELET # BLD AUTO: 90 K/UL — LOW (ref 150–400)
POTASSIUM SERPL-MCNC: 3.7 MMOL/L — SIGNIFICANT CHANGE UP (ref 3.5–5.3)
POTASSIUM SERPL-SCNC: 3.7 MMOL/L — SIGNIFICANT CHANGE UP (ref 3.5–5.3)
PROT SERPL-MCNC: 6.1 G/DL — SIGNIFICANT CHANGE UP (ref 6–8.3)
PROTHROM AB SERPL-ACNC: 15.9 SEC — HIGH (ref 9.5–13)
RBC # BLD: 3.9 M/UL — SIGNIFICANT CHANGE UP (ref 3.8–5.2)
RBC # FLD: 12 % — SIGNIFICANT CHANGE UP (ref 10.3–14.5)
SODIUM SERPL-SCNC: 135 MMOL/L — SIGNIFICANT CHANGE UP (ref 135–145)
WBC # BLD: 4.94 K/UL — SIGNIFICANT CHANGE UP (ref 3.8–10.5)
WBC # FLD AUTO: 4.94 K/UL — SIGNIFICANT CHANGE UP (ref 3.8–10.5)

## 2024-07-11 RX ADMIN — Medication 88 MICROGRAM(S): at 06:00

## 2024-07-11 RX ADMIN — SACUBITRIL AND VALSARTAN 1 TABLET(S): 97; 103 TABLET, FILM COATED ORAL at 06:00

## 2024-07-11 RX ADMIN — IPRATROPIUM BROMIDE AND ALBUTEROL SULFATE 3 MILLILITER(S): .5; 3 SOLUTION RESPIRATORY (INHALATION) at 17:23

## 2024-07-11 RX ADMIN — IPRATROPIUM BROMIDE AND ALBUTEROL SULFATE 3 MILLILITER(S): .5; 3 SOLUTION RESPIRATORY (INHALATION) at 05:59

## 2024-07-11 RX ADMIN — Medication 3 MILLIGRAM(S): at 22:02

## 2024-07-11 RX ADMIN — TRAZODONE HYDROCHLORIDE 150 MILLIGRAM(S): 50 TABLET, FILM COATED ORAL at 21:57

## 2024-07-11 RX ADMIN — ATORVASTATIN CALCIUM 40 MILLIGRAM(S): 20 TABLET, FILM COATED ORAL at 22:02

## 2024-07-11 RX ADMIN — SACUBITRIL AND VALSARTAN 1 TABLET(S): 97; 103 TABLET, FILM COATED ORAL at 17:23

## 2024-07-11 RX ADMIN — Medication 50 MILLIGRAM(S): at 06:00

## 2024-07-11 RX ADMIN — Medication 100 MILLIGRAM(S): at 17:23

## 2024-07-11 RX ADMIN — REMDESIVIR 200 MILLIGRAM(S): 5 INJECTION INTRAVENOUS at 11:31

## 2024-07-11 NOTE — DIETITIAN INITIAL EVALUATION ADULT - ENERGY INTAKE
Poor (<50%) Per aide patient with decreased appetite and PO intake for a few days in setting of acuteness of illness, notes somewhat improvement. RD observed poor PO intake of lunch entree, per aide patient ate whole cup of soup.

## 2024-07-11 NOTE — DIETITIAN INITIAL EVALUATION ADULT - REASON INDICATOR FOR ASSESSMENT
RD assessment warranted for: Consult for MST score 2 or >, Pressure injury stage 2 or >. Chart reviewed, events noted.  Source: medical record, aide at bedside, RN, [facility]VARO transfer papers.

## 2024-07-11 NOTE — DIETITIAN INITIAL EVALUATION ADULT - PERTINENT LABORATORY DATA
07-11    135  |  103  |  15  ----------------------------<  85  3.7   |  21<L>  |  0.87    Ca    9.3      11 Jul 2024 07:35  Phos  2.6     07-11  Mg     2.0     07-11    TPro  6.1  /  Alb  3.0<L>  /  TBili  0.3  /  DBili  <0.1  /  AST  27  /  ALT  11  /  AlkPhos  70  07-11

## 2024-07-11 NOTE — DIETITIAN INITIAL EVALUATION ADULT - OTHER INFO
UBW 96-98lb per aide at bedside, unsure of changes in pt's weight.  Dosing weight: 104.7lb (7/9, bed).  Daily weight: 100lb (7/10, bed).  RD unable to obtain bed-scale weight as patient OOB to chair during visit.   IBW: 94lb, %IBW: 106% based on daily weight.  No weight history available per [facility]. RD to continue to monitor weight trends as available/able.     -ordered for IV antibiotics for PNA.  -covid+ ordered for remdesivir.  -ordered for oral synthroid.

## 2024-07-11 NOTE — DIETITIAN INITIAL EVALUATION ADULT - PERTINENT MEDS FT
MEDICATIONS  (STANDING):  albuterol/ipratropium for Nebulization 3 milliLiter(s) Nebulizer every 6 hours  atorvastatin 40 milliGRAM(s) Oral at bedtime  azithromycin  IVPB      azithromycin  IVPB 500 milliGRAM(s) IV Intermittent every 24 hours  cefTRIAXone   IVPB 1000 milliGRAM(s) IV Intermittent every 24 hours  clopidogrel Tablet 75 milliGRAM(s) Oral daily  levothyroxine 88 MICROGram(s) Oral daily  metoprolol succinate ER 50 milliGRAM(s) Oral daily  QUEtiapine 12.5 milliGRAM(s) Oral daily  remdesivir  IVPB   IV Intermittent   remdesivir  IVPB 100 milliGRAM(s) IV Intermittent every 24 hours  rivaroxaban 15 milliGRAM(s) Oral daily  sacubitril 24 mG/valsartan 26 mG 1 Tablet(s) Oral two times a day  sertraline 150 milliGRAM(s) Oral daily  traZODone 150 milliGRAM(s) Oral at bedtime    MEDICATIONS  (PRN):  acetaminophen     Tablet .. 650 milliGRAM(s) Oral every 6 hours PRN Temp greater or equal to 38C (100.4F), Mild Pain (1 - 3)  aluminum hydroxide/magnesium hydroxide/simethicone Suspension 30 milliLiter(s) Oral every 4 hours PRN Dyspepsia  benzonatate 100 milliGRAM(s) Oral every 8 hours PRN Cough  guaifenesin/dextromethorphan Oral Liquid 10 milliLiter(s) Oral every 4 hours PRN Cough  melatonin 3 milliGRAM(s) Oral at bedtime PRN Insomnia  ondansetron Injectable 4 milliGRAM(s) IV Push every 8 hours PRN Nausea and/or Vomiting

## 2024-07-11 NOTE — DIETITIAN INITIAL EVALUATION ADULT - OTHER CALCULATIONS
Defer fluid needs to team.  Estimated nutrient needs based on daily weight 100lb, with consideration for skin integrity and age

## 2024-07-11 NOTE — DIETITIAN INITIAL EVALUATION ADULT - PERSON TAUGHT/METHOD
Educated on the importance of consuming a diet adequate in protein and micronutrients for wound healing/skin integrity. Emphasized the importance of adequate kcal and protein intake, provided recommendations to optimize nutritional intake in case of decreased appetite, recommended small frequent meals by consuming nutrient-dense snacks between meals, to start with protein, and sips of supplement throughout the day. Patient without specific food preferences for RD at this time. Patient made aware RD to remain available./verbal instruction/caregiver

## 2024-07-11 NOTE — DIETITIAN INITIAL EVALUATION ADULT - ADD RECOMMEND
-Liberalize Low Sodium diet to Regular in setting of poor PO intake.  -Add Ensure Plus High Protein x 1/day (provides 350 kcal, 20 g protein).  -Recommend adding multivitamin and vitamin C for wound healing pending no medical contraindications.  -Monitor PO intake, diet, weight, labs, skin, GI symptoms, and BM regularity.  -RD remains available upon request and will follow up per protocol.  -Monitor for malnutrition.

## 2024-07-11 NOTE — DIETITIAN INITIAL EVALUATION ADULT - ORAL INTAKE PTA/DIET HISTORY
Baseline good appetite and PO intake PTA. Per H&P patient noted with nausea/vomiting. Per chart patient presents from nursing home' per transfer papers patient ordered for Regular diet with Boost High Protein 1x/day (250kcal, 20g protein) PTA. no known food allergies per chart.

## 2024-07-11 NOTE — PROGRESS NOTE ADULT - SUBJECTIVE AND OBJECTIVE BOX
[facility] Division of Hospital Medicine  Clarence Miller DO  Reachable on [employer] Teams    Patient is a 89y old  Female who presents with a chief complaint of Infection due to severe acute respiratory syndrome coronavirus 2 (SARS-CoV-2)     (11 Jul 2024 14:31)    SUBJECTIVE / OVERNIGHT EVENTS: No acute events overnight. Patient seen and examined at bedside this morning, unable to obtain ROS due to dementia.    ADDITIONAL REVIEW OF SYSTEMS: Unable to obtain due to dementia.    MEDICATIONS  (STANDING):  albuterol/ipratropium for Nebulization 3 milliLiter(s) Nebulizer every 6 hours  atorvastatin 40 milliGRAM(s) Oral at bedtime  azithromycin  IVPB      azithromycin  IVPB 500 milliGRAM(s) IV Intermittent every 24 hours  cefTRIAXone   IVPB 1000 milliGRAM(s) IV Intermittent every 24 hours  clopidogrel Tablet 75 milliGRAM(s) Oral daily  levothyroxine 88 MICROGram(s) Oral daily  metoprolol succinate ER 50 milliGRAM(s) Oral daily  QUEtiapine 12.5 milliGRAM(s) Oral daily  remdesivir  IVPB   IV Intermittent   remdesivir  IVPB 100 milliGRAM(s) IV Intermittent every 24 hours  rivaroxaban 15 milliGRAM(s) Oral daily  sacubitril 24 mG/valsartan 26 mG 1 Tablet(s) Oral two times a day  sertraline 150 milliGRAM(s) Oral daily  traZODone 150 milliGRAM(s) Oral at bedtime    MEDICATIONS  (PRN):  acetaminophen     Tablet .. 650 milliGRAM(s) Oral every 6 hours PRN Temp greater or equal to 38C (100.4F), Mild Pain (1 - 3)  aluminum hydroxide/magnesium hydroxide/simethicone Suspension 30 milliLiter(s) Oral every 4 hours PRN Dyspepsia  benzonatate 100 milliGRAM(s) Oral every 8 hours PRN Cough  guaifenesin/dextromethorphan Oral Liquid 10 milliLiter(s) Oral every 4 hours PRN Cough  melatonin 3 milliGRAM(s) Oral at bedtime PRN Insomnia  ondansetron Injectable 4 milliGRAM(s) IV Push every 8 hours PRN Nausea and/or Vomiting      CAPILLARY BLOOD GLUCOSE        I&O's Summary      PHYSICAL EXAM:  Vital Signs Last 24 Hrs  T(C): 36.7 (11 Jul 2024 14:02), Max: 37.1 (10 Jul 2024 16:34)  T(F): 98.1 (11 Jul 2024 14:02), Max: 98.7 (10 Jul 2024 16:34)  HR: 81 (11 Jul 2024 14:02) (78 - 88)  BP: 96/66 (11 Jul 2024 14:02) (96/66 - 127/85)  BP(mean): --  RR: 18 (11 Jul 2024 14:02) (18 - 18)  SpO2: 93% (11 Jul 2024 14:02) (93% - 95%)    Parameters below as of 11 Jul 2024 14:02  Patient On (Oxygen Delivery Method): nasal cannula  O2 Flow (L/min): 1    CONSTITUTIONAL: NAD, well-developed, well-groomed  RESPIRATORY: Normal respiratory effort; lungs are clear to auscultation bilaterally  CARDIOVASCULAR: Regular rate and rhythm, normal S1 and S2, no murmur/rub/gallop  ABDOMEN: Nontender to palpation, soft, nondistended  PSYCH: Somnolent, unable to assess orientation    LABS:                        12.5   4.94  )-----------( 90       ( 11 Jul 2024 07:25 )             38.0     07-11    135  |  103  |  15  ----------------------------<  85  3.7   |  21<L>  |  0.87    Ca    9.3      11 Jul 2024 07:35  Phos  2.6     07-11  Mg     2.0     07-11    TPro  6.1  /  Alb  3.0<L>  /  TBili  0.3  /  DBili  <0.1  /  AST  27  /  ALT  11  /  AlkPhos  70  07-11    PT/INR - ( 11 Jul 2024 07:28 )   PT: 15.9 sec;   INR: 1.46 ratio               Urinalysis Basic - ( 11 Jul 2024 07:35 )    Color: x / Appearance: x / SG: x / pH: x  Gluc: 85 mg/dL / Ketone: x  / Bili: x / Urobili: x   Blood: x / Protein: x / Nitrite: x   Leuk Esterase: x / RBC: x / WBC x   Sq Epi: x / Non Sq Epi: x / Bacteria: x        Culture - Urine (collected 08 Jul 2024 22:29)  Source: Catheterized Catheterized  Final Report (10 Jul 2024 12:54):    No growth    Culture - Blood (collected 08 Jul 2024 20:35)  Source: .Blood Blood-Peripheral  Preliminary Report (11 Jul 2024 02:02):    No growth at 48 Hours    Culture - Blood (collected 08 Jul 2024 20:20)  Source: .Blood Blood-Peripheral  Preliminary Report (11 Jul 2024 02:02):    No growth at 48 Hours

## 2024-07-12 LAB
ALBUMIN SERPL ELPH-MCNC: 3.3 G/DL — SIGNIFICANT CHANGE UP (ref 3.3–5)
ALP SERPL-CCNC: 65 U/L — SIGNIFICANT CHANGE UP (ref 40–120)
ALT FLD-CCNC: 11 U/L — SIGNIFICANT CHANGE UP (ref 10–45)
ANION GAP SERPL CALC-SCNC: 13 MMOL/L — SIGNIFICANT CHANGE UP (ref 5–17)
AST SERPL-CCNC: 22 U/L — SIGNIFICANT CHANGE UP (ref 10–40)
BILIRUB SERPL-MCNC: 0.3 MG/DL — SIGNIFICANT CHANGE UP (ref 0.2–1.2)
BUN SERPL-MCNC: 18 MG/DL — SIGNIFICANT CHANGE UP (ref 7–23)
CALCIUM SERPL-MCNC: 9.2 MG/DL — SIGNIFICANT CHANGE UP (ref 8.4–10.5)
CHLORIDE SERPL-SCNC: 104 MMOL/L — SIGNIFICANT CHANGE UP (ref 96–108)
CO2 SERPL-SCNC: 21 MMOL/L — LOW (ref 22–31)
CREAT SERPL-MCNC: 0.89 MG/DL — SIGNIFICANT CHANGE UP (ref 0.5–1.3)
EGFR: 62 ML/MIN/1.73M2 — SIGNIFICANT CHANGE UP
GLUCOSE SERPL-MCNC: 81 MG/DL — SIGNIFICANT CHANGE UP (ref 70–99)
HCT VFR BLD CALC: 35.3 % — SIGNIFICANT CHANGE UP (ref 34.5–45)
HGB BLD-MCNC: 11.9 G/DL — SIGNIFICANT CHANGE UP (ref 11.5–15.5)
MAGNESIUM SERPL-MCNC: 2 MG/DL — SIGNIFICANT CHANGE UP (ref 1.6–2.6)
MCHC RBC-ENTMCNC: 32.4 PG — SIGNIFICANT CHANGE UP (ref 27–34)
MCHC RBC-ENTMCNC: 33.7 GM/DL — SIGNIFICANT CHANGE UP (ref 32–36)
MCV RBC AUTO: 96.2 FL — SIGNIFICANT CHANGE UP (ref 80–100)
NRBC # BLD: 0 /100 WBCS — SIGNIFICANT CHANGE UP (ref 0–0)
PHOSPHATE SERPL-MCNC: 2.8 MG/DL — SIGNIFICANT CHANGE UP (ref 2.5–4.5)
PLATELET # BLD AUTO: 99 K/UL — LOW (ref 150–400)
POTASSIUM SERPL-MCNC: 3.3 MMOL/L — LOW (ref 3.5–5.3)
POTASSIUM SERPL-SCNC: 3.3 MMOL/L — LOW (ref 3.5–5.3)
PROT SERPL-MCNC: 5.9 G/DL — LOW (ref 6–8.3)
RBC # BLD: 3.67 M/UL — LOW (ref 3.8–5.2)
RBC # FLD: 11.9 % — SIGNIFICANT CHANGE UP (ref 10.3–14.5)
SODIUM SERPL-SCNC: 138 MMOL/L — SIGNIFICANT CHANGE UP (ref 135–145)
WBC # BLD: 4.38 K/UL — SIGNIFICANT CHANGE UP (ref 3.8–10.5)
WBC # FLD AUTO: 4.38 K/UL — SIGNIFICANT CHANGE UP (ref 3.8–10.5)

## 2024-07-12 RX ORDER — POTASSIUM CHLORIDE 600 MG/1
40 TABLET, FILM COATED, EXTENDED RELEASE ORAL EVERY 4 HOURS
Refills: 0 | Status: COMPLETED | OUTPATIENT
Start: 2024-07-12 | End: 2024-07-12

## 2024-07-12 RX ORDER — DEXAMETHASONE 1 MG/1
6 TABLET ORAL DAILY
Refills: 0 | Status: DISCONTINUED | OUTPATIENT
Start: 2024-07-12 | End: 2024-07-15

## 2024-07-12 RX ORDER — POTASSIUM CHLORIDE 600 MG/1
40 TABLET, FILM COATED, EXTENDED RELEASE ORAL EVERY 4 HOURS
Refills: 0 | Status: DISCONTINUED | OUTPATIENT
Start: 2024-07-12 | End: 2024-07-12

## 2024-07-12 RX ORDER — ALPRAZOLAM 2 MG/1
0.25 TABLET ORAL ONCE
Refills: 0 | Status: DISCONTINUED | OUTPATIENT
Start: 2024-07-12 | End: 2024-07-12

## 2024-07-12 RX ADMIN — RIVAROXABAN 15 MILLIGRAM(S): 10 TABLET, FILM COATED ORAL at 11:55

## 2024-07-12 RX ADMIN — Medication 50 MILLIGRAM(S): at 06:09

## 2024-07-12 RX ADMIN — CLOPIDOGREL BISULFATE 75 MILLIGRAM(S): 75 TABLET, FILM COATED ORAL at 11:56

## 2024-07-12 RX ADMIN — REMDESIVIR 200 MILLIGRAM(S): 5 INJECTION INTRAVENOUS at 11:55

## 2024-07-12 RX ADMIN — DEXAMETHASONE 6 MILLIGRAM(S): 1 TABLET ORAL at 13:15

## 2024-07-12 RX ADMIN — IPRATROPIUM BROMIDE AND ALBUTEROL SULFATE 3 MILLILITER(S): .5; 3 SOLUTION RESPIRATORY (INHALATION) at 06:17

## 2024-07-12 RX ADMIN — TRAZODONE HYDROCHLORIDE 150 MILLIGRAM(S): 50 TABLET, FILM COATED ORAL at 22:51

## 2024-07-12 RX ADMIN — Medication 1 APPLICATION(S): at 18:10

## 2024-07-12 RX ADMIN — ALPRAZOLAM 0.25 MILLIGRAM(S): 2 TABLET ORAL at 01:27

## 2024-07-12 RX ADMIN — AZITHROMYCIN 255 MILLIGRAM(S): 250 TABLET, FILM COATED ORAL at 11:55

## 2024-07-12 RX ADMIN — SERTRALINE HYDROCHLORIDE 150 MILLIGRAM(S): 100 TABLET, FILM COATED ORAL at 11:56

## 2024-07-12 RX ADMIN — SACUBITRIL AND VALSARTAN 1 TABLET(S): 97; 103 TABLET, FILM COATED ORAL at 06:09

## 2024-07-12 RX ADMIN — Medication 12.5 MILLIGRAM(S): at 22:51

## 2024-07-12 RX ADMIN — POTASSIUM CHLORIDE 40 MILLIEQUIVALENT(S): 600 TABLET, FILM COATED, EXTENDED RELEASE ORAL at 13:13

## 2024-07-12 RX ADMIN — ATORVASTATIN CALCIUM 40 MILLIGRAM(S): 20 TABLET, FILM COATED ORAL at 23:33

## 2024-07-12 RX ADMIN — IPRATROPIUM BROMIDE AND ALBUTEROL SULFATE 3 MILLILITER(S): .5; 3 SOLUTION RESPIRATORY (INHALATION) at 11:56

## 2024-07-12 RX ADMIN — Medication 3 MILLIGRAM(S): at 22:51

## 2024-07-12 RX ADMIN — POTASSIUM CHLORIDE 40 MILLIEQUIVALENT(S): 600 TABLET, FILM COATED, EXTENDED RELEASE ORAL at 09:50

## 2024-07-12 RX ADMIN — SACUBITRIL AND VALSARTAN 1 TABLET(S): 97; 103 TABLET, FILM COATED ORAL at 18:10

## 2024-07-12 RX ADMIN — IPRATROPIUM BROMIDE AND ALBUTEROL SULFATE 3 MILLILITER(S): .5; 3 SOLUTION RESPIRATORY (INHALATION) at 18:10

## 2024-07-12 RX ADMIN — Medication 88 MICROGRAM(S): at 06:09

## 2024-07-12 NOTE — PROGRESS NOTE ADULT - PROBLEM SELECTOR PLAN 2
- c/w remdesivir  - Supplemental O2 therapy via NC  - Antitussives and duoneb PRN - c/w remdesivir, added Decadron 7/12 due to hypoxia  - Supplemental O2 therapy via NC  - Antitussives and duoneb PRN  - Admitted with sepsis, resolving

## 2024-07-12 NOTE — CDI QUERY NOTE - NSCDIOTHERTXTBX_GEN_ALL_CORE_HH
The patient was admitted with COVID 19 pneumonia.  ED vital signs:      T-98.1 --> 102.1    P-74    BP-124/75    R-20 --> 21    WBC = 8.21    ED note:  SEPSIS – was this patient treated for sepsis? Yes.    Event Note on 7/9/2024  18:37:  Notified by RN of patient with temp of 102.7 orally and HR of 114.     QUERY:  Please clarify the status of sepsis, if known?  = Admitted with sepsis, resolving or resolved.  = Sepsis ruled out  = Other    Thank you.

## 2024-07-12 NOTE — PROGRESS NOTE ADULT - SUBJECTIVE AND OBJECTIVE BOX
[facility] Division of Hospital Medicine  Clarence Miller DO  Reachable on [employer] Teams    Patient is a 89y old  Female who presents with a chief complaint of AMS, cough (11 Jul 2024 15:06)      SUBJECTIVE / OVERNIGHT EVENTS:  ADDITIONAL REVIEW OF SYSTEMS:    MEDICATIONS  (STANDING):  albuterol/ipratropium for Nebulization 3 milliLiter(s) Nebulizer every 6 hours  atorvastatin 40 milliGRAM(s) Oral at bedtime  azithromycin  IVPB      azithromycin  IVPB 500 milliGRAM(s) IV Intermittent every 24 hours  cefTRIAXone   IVPB 1000 milliGRAM(s) IV Intermittent every 24 hours  clopidogrel Tablet 75 milliGRAM(s) Oral daily  levothyroxine 88 MICROGram(s) Oral daily  metoprolol succinate ER 50 milliGRAM(s) Oral daily  QUEtiapine 12.5 milliGRAM(s) Oral daily  remdesivir  IVPB   IV Intermittent   remdesivir  IVPB 100 milliGRAM(s) IV Intermittent every 24 hours  rivaroxaban 15 milliGRAM(s) Oral daily  sacubitril 24 mG/valsartan 26 mG 1 Tablet(s) Oral two times a day  sertraline 150 milliGRAM(s) Oral daily  traZODone 150 milliGRAM(s) Oral at bedtime    MEDICATIONS  (PRN):  acetaminophen     Tablet .. 650 milliGRAM(s) Oral every 6 hours PRN Temp greater or equal to 38C (100.4F), Mild Pain (1 - 3)  aluminum hydroxide/magnesium hydroxide/simethicone Suspension 30 milliLiter(s) Oral every 4 hours PRN Dyspepsia  benzonatate 100 milliGRAM(s) Oral every 8 hours PRN Cough  guaifenesin/dextromethorphan Oral Liquid 10 milliLiter(s) Oral every 4 hours PRN Cough  melatonin 3 milliGRAM(s) Oral at bedtime PRN Insomnia  ondansetron Injectable 4 milliGRAM(s) IV Push every 8 hours PRN Nausea and/or Vomiting      CAPILLARY BLOOD GLUCOSE        I&O's Summary      PHYSICAL EXAM:  Vital Signs Last 24 Hrs  T(C): 36.3 (12 Jul 2024 05:38), Max: 36.7 (11 Jul 2024 14:02)  T(F): 97.3 (12 Jul 2024 05:38), Max: 98.1 (11 Jul 2024 14:02)  HR: 78 (12 Jul 2024 05:38) (78 - 86)  BP: 104/65 (12 Jul 2024 05:38) (96/66 - 129/78)  BP(mean): --  RR: 18 (12 Jul 2024 05:38) (18 - 18)  SpO2: 93% (12 Jul 2024 05:38) (93% - 95%)    Parameters below as of 12 Jul 2024 05:38  Patient On (Oxygen Delivery Method): nasal cannula      CONSTITUTIONAL: NAD, well-developed, well-groomed  EYES: PERRLA; conjunctiva and sclera clear  ENMT: Moist oral mucosa, no pharyngeal injection or exudates; normal dentition  NECK: Supple, no palpable masses; no thyromegaly  RESPIRATORY: Normal respiratory effort; lungs are clear to auscultation bilaterally  CARDIOVASCULAR: Regular rate and rhythm, normal S1 and S2, no murmur/rub/gallop; No lower extremity edema; Peripheral pulses are 2+ bilaterally  ABDOMEN: Nontender to palpation, normoactive bowel sounds, no rebound/guarding; No hepatosplenomegaly  MUSCULOSKELETAL:  Normal gait; no clubbing or cyanosis of digits; no joint swelling or tenderness to palpation  PSYCH: A+O to person, place, and time; affect appropriate  NEUROLOGY: CN 2-12 are intact and symmetric; no gross sensory deficits   SKIN: No rashes; no palpable lesions    LABS:                        11.9   4.38  )-----------( 99       ( 12 Jul 2024 07:18 )             35.3     07-11    135  |  103  |  15  ----------------------------<  85  3.7   |  21<L>  |  0.87    Ca    9.3      11 Jul 2024 07:35  Phos  2.6     07-11  Mg     2.0     07-11    TPro  6.1  /  Alb  3.0<L>  /  TBili  0.3  /  DBili  <0.1  /  AST  27  /  ALT  11  /  AlkPhos  70  07-11    PT/INR - ( 11 Jul 2024 07:28 )   PT: 15.9 sec;   INR: 1.46 ratio               Urinalysis Basic - ( 11 Jul 2024 07:35 )    Color: x / Appearance: x / SG: x / pH: x  Gluc: 85 mg/dL / Ketone: x  / Bili: x / Urobili: x   Blood: x / Protein: x / Nitrite: x   Leuk Esterase: x / RBC: x / WBC x   Sq Epi: x / Non Sq Epi: x / Bacteria: x          RADIOLOGY & ADDITIONAL TESTS:  Results Reviewed:   Imaging Personally Reviewed:  Electrocardiogram Personally Reviewed:    COORDINATION OF CARE:  Care Discussed with Consultants/Other Providers [Y/N]:  Prior or Outpatient Records Reviewed [Y/N]:   [facility] Division of Hospital Medicine  Clarence Miller DO  Reachable on [employer] Teams    Patient is a 89y old  Female who presents with a chief complaint of AMS, cough (11 Jul 2024 15:06)    SUBJECTIVE / OVERNIGHT EVENTS: No acute events overnight. Patient seen and examined at bedside this morning, unable to obtain ROS due to dementia.    ADDITIONAL REVIEW OF SYSTEMS: Unable to obtain due to dementia.    MEDICATIONS  (STANDING):  albuterol/ipratropium for Nebulization 3 milliLiter(s) Nebulizer every 6 hours  atorvastatin 40 milliGRAM(s) Oral at bedtime  azithromycin  IVPB      azithromycin  IVPB 500 milliGRAM(s) IV Intermittent every 24 hours  cefTRIAXone   IVPB 1000 milliGRAM(s) IV Intermittent every 24 hours  clopidogrel Tablet 75 milliGRAM(s) Oral daily  levothyroxine 88 MICROGram(s) Oral daily  metoprolol succinate ER 50 milliGRAM(s) Oral daily  QUEtiapine 12.5 milliGRAM(s) Oral daily  remdesivir  IVPB   IV Intermittent   remdesivir  IVPB 100 milliGRAM(s) IV Intermittent every 24 hours  rivaroxaban 15 milliGRAM(s) Oral daily  sacubitril 24 mG/valsartan 26 mG 1 Tablet(s) Oral two times a day  sertraline 150 milliGRAM(s) Oral daily  traZODone 150 milliGRAM(s) Oral at bedtime    MEDICATIONS  (PRN):  acetaminophen     Tablet .. 650 milliGRAM(s) Oral every 6 hours PRN Temp greater or equal to 38C (100.4F), Mild Pain (1 - 3)  aluminum hydroxide/magnesium hydroxide/simethicone Suspension 30 milliLiter(s) Oral every 4 hours PRN Dyspepsia  benzonatate 100 milliGRAM(s) Oral every 8 hours PRN Cough  guaifenesin/dextromethorphan Oral Liquid 10 milliLiter(s) Oral every 4 hours PRN Cough  melatonin 3 milliGRAM(s) Oral at bedtime PRN Insomnia  ondansetron Injectable 4 milliGRAM(s) IV Push every 8 hours PRN Nausea and/or Vomiting      CAPILLARY BLOOD GLUCOSE        I&O's Summary      PHYSICAL EXAM:  Vital Signs Last 24 Hrs  T(C): 36.3 (12 Jul 2024 05:38), Max: 36.7 (11 Jul 2024 14:02)  T(F): 97.3 (12 Jul 2024 05:38), Max: 98.1 (11 Jul 2024 14:02)  HR: 78 (12 Jul 2024 05:38) (78 - 86)  BP: 104/65 (12 Jul 2024 05:38) (96/66 - 129/78)  BP(mean): --  RR: 18 (12 Jul 2024 05:38) (18 - 18)  SpO2: 93% (12 Jul 2024 05:38) (93% - 95%)    Parameters below as of 12 Jul 2024 05:38  Patient On (Oxygen Delivery Method): nasal cannula    CONSTITUTIONAL: Elderly female laying in bed in NAD  RESPIRATORY: Normal respiratory effort; lungs are clear to auscultation bilaterally  CARDIOVASCULAR: Regular rate and rhythm, normal S1 and S2, no murmur/rub/gallop  ABDOMEN: Nontender to palpation, soft, nondistended  PSYCH: Awake, alert and oriented to self but not place or time    LABS:                        11.9   4.38  )-----------( 99       ( 12 Jul 2024 07:18 )             35.3     07-11    135  |  103  |  15  ----------------------------<  85  3.7   |  21<L>  |  0.87    Ca    9.3      11 Jul 2024 07:35  Phos  2.6     07-11  Mg     2.0     07-11    TPro  6.1  /  Alb  3.0<L>  /  TBili  0.3  /  DBili  <0.1  /  AST  27  /  ALT  11  /  AlkPhos  70  07-11    PT/INR - ( 11 Jul 2024 07:28 )   PT: 15.9 sec;   INR: 1.46 ratio               Urinalysis Basic - ( 11 Jul 2024 07:35 )    Color: x / Appearance: x / SG: x / pH: x  Gluc: 85 mg/dL / Ketone: x  / Bili: x / Urobili: x   Blood: x / Protein: x / Nitrite: x   Leuk Esterase: x / RBC: x / WBC x   Sq Epi: x / Non Sq Epi: x / Bacteria: x

## 2024-07-13 LAB
ANION GAP SERPL CALC-SCNC: 12 MMOL/L — SIGNIFICANT CHANGE UP (ref 5–17)
BUN SERPL-MCNC: 18 MG/DL — SIGNIFICANT CHANGE UP (ref 7–23)
CALCIUM SERPL-MCNC: 9.4 MG/DL — SIGNIFICANT CHANGE UP (ref 8.4–10.5)
CHLORIDE SERPL-SCNC: 106 MMOL/L — SIGNIFICANT CHANGE UP (ref 96–108)
CO2 SERPL-SCNC: 20 MMOL/L — LOW (ref 22–31)
CREAT SERPL-MCNC: 0.72 MG/DL — SIGNIFICANT CHANGE UP (ref 0.5–1.3)
EGFR: 80 ML/MIN/1.73M2 — SIGNIFICANT CHANGE UP
GLUCOSE SERPL-MCNC: 117 MG/DL — HIGH (ref 70–99)
HCT VFR BLD CALC: 34.8 % — SIGNIFICANT CHANGE UP (ref 34.5–45)
HGB BLD-MCNC: 11.3 G/DL — LOW (ref 11.5–15.5)
MAGNESIUM SERPL-MCNC: 2.2 MG/DL — SIGNIFICANT CHANGE UP (ref 1.6–2.6)
MCHC RBC-ENTMCNC: 32.5 GM/DL — SIGNIFICANT CHANGE UP (ref 32–36)
MCHC RBC-ENTMCNC: 32.5 PG — SIGNIFICANT CHANGE UP (ref 27–34)
MCV RBC AUTO: 100 FL — SIGNIFICANT CHANGE UP (ref 80–100)
MRSA PCR RESULT.: SIGNIFICANT CHANGE UP
NRBC # BLD: 0 /100 WBCS — SIGNIFICANT CHANGE UP (ref 0–0)
PHOSPHATE SERPL-MCNC: 3.5 MG/DL — SIGNIFICANT CHANGE UP (ref 2.5–4.5)
PLATELET # BLD AUTO: SIGNIFICANT CHANGE UP (ref 150–400)
POTASSIUM SERPL-MCNC: 4.7 MMOL/L — SIGNIFICANT CHANGE UP (ref 3.5–5.3)
POTASSIUM SERPL-SCNC: 4.7 MMOL/L — SIGNIFICANT CHANGE UP (ref 3.5–5.3)
RBC # BLD: 3.48 M/UL — LOW (ref 3.8–5.2)
RBC # FLD: 11.9 % — SIGNIFICANT CHANGE UP (ref 10.3–14.5)
S AUREUS DNA NOSE QL NAA+PROBE: SIGNIFICANT CHANGE UP
SODIUM SERPL-SCNC: 138 MMOL/L — SIGNIFICANT CHANGE UP (ref 135–145)
WBC # BLD: 3.53 K/UL — LOW (ref 3.8–10.5)
WBC # FLD AUTO: 3.53 K/UL — LOW (ref 3.8–10.5)

## 2024-07-13 RX ADMIN — SACUBITRIL AND VALSARTAN 1 TABLET(S): 97; 103 TABLET, FILM COATED ORAL at 06:11

## 2024-07-13 RX ADMIN — Medication 1 APPLICATION(S): at 06:20

## 2024-07-13 RX ADMIN — Medication 12.5 MILLIGRAM(S): at 21:46

## 2024-07-13 RX ADMIN — TRAZODONE HYDROCHLORIDE 150 MILLIGRAM(S): 50 TABLET, FILM COATED ORAL at 21:46

## 2024-07-13 RX ADMIN — CLOPIDOGREL BISULFATE 75 MILLIGRAM(S): 75 TABLET, FILM COATED ORAL at 12:57

## 2024-07-13 RX ADMIN — ATORVASTATIN CALCIUM 40 MILLIGRAM(S): 20 TABLET, FILM COATED ORAL at 21:48

## 2024-07-13 RX ADMIN — DEXAMETHASONE 6 MILLIGRAM(S): 1 TABLET ORAL at 06:11

## 2024-07-13 RX ADMIN — RIVAROXABAN 15 MILLIGRAM(S): 10 TABLET, FILM COATED ORAL at 12:57

## 2024-07-13 RX ADMIN — SACUBITRIL AND VALSARTAN 1 TABLET(S): 97; 103 TABLET, FILM COATED ORAL at 21:45

## 2024-07-13 RX ADMIN — Medication 50 MILLIGRAM(S): at 06:11

## 2024-07-13 RX ADMIN — IPRATROPIUM BROMIDE AND ALBUTEROL SULFATE 3 MILLILITER(S): .5; 3 SOLUTION RESPIRATORY (INHALATION) at 23:55

## 2024-07-13 RX ADMIN — Medication 10 MILLILITER(S): at 21:51

## 2024-07-13 RX ADMIN — IPRATROPIUM BROMIDE AND ALBUTEROL SULFATE 3 MILLILITER(S): .5; 3 SOLUTION RESPIRATORY (INHALATION) at 12:56

## 2024-07-13 RX ADMIN — Medication 88 MICROGRAM(S): at 06:11

## 2024-07-13 RX ADMIN — SERTRALINE HYDROCHLORIDE 150 MILLIGRAM(S): 100 TABLET, FILM COATED ORAL at 12:57

## 2024-07-13 RX ADMIN — REMDESIVIR 200 MILLIGRAM(S): 5 INJECTION INTRAVENOUS at 10:39

## 2024-07-13 RX ADMIN — IPRATROPIUM BROMIDE AND ALBUTEROL SULFATE 3 MILLILITER(S): .5; 3 SOLUTION RESPIRATORY (INHALATION) at 18:53

## 2024-07-13 NOTE — PROGRESS NOTE ADULT - SUBJECTIVE AND OBJECTIVE BOX
[facility] Division of Hospital Medicine  Clarence Miller DO  Reachable on [employer] Teams    Patient is a 89y old  Female who presents with a chief complaint of AMS, cough (12 Jul 2024 08:02)    SUBJECTIVE / OVERNIGHT EVENTS: No acute events overnight. Patient seen and examined at bedside this morning, unable to obtain review of systems due to dementia.    ADDITIONAL REVIEW OF SYSTEMS: Unable to obtain due to dementia.    MEDICATIONS  (STANDING):  albuterol/ipratropium for Nebulization 3 milliLiter(s) Nebulizer every 6 hours  atorvastatin 40 milliGRAM(s) Oral at bedtime  chlorhexidine 2% Cloths 1 Application(s) Topical <User Schedule>  clopidogrel Tablet 75 milliGRAM(s) Oral daily  dexAMETHasone  Injectable 6 milliGRAM(s) IV Push daily  levothyroxine 88 MICROGram(s) Oral daily  metoprolol succinate ER 50 milliGRAM(s) Oral daily  QUEtiapine 12.5 milliGRAM(s) Oral at bedtime  rivaroxaban 15 milliGRAM(s) Oral daily  sacubitril 24 mG/valsartan 26 mG 1 Tablet(s) Oral two times a day  sertraline 150 milliGRAM(s) Oral daily  traZODone 150 milliGRAM(s) Oral at bedtime    MEDICATIONS  (PRN):  acetaminophen     Tablet .. 650 milliGRAM(s) Oral every 6 hours PRN Temp greater or equal to 38C (100.4F), Mild Pain (1 - 3)  aluminum hydroxide/magnesium hydroxide/simethicone Suspension 30 milliLiter(s) Oral every 4 hours PRN Dyspepsia  benzonatate 100 milliGRAM(s) Oral every 8 hours PRN Cough  guaifenesin/dextromethorphan Oral Liquid 10 milliLiter(s) Oral every 4 hours PRN Cough  melatonin 3 milliGRAM(s) Oral at bedtime PRN Insomnia  ondansetron Injectable 4 milliGRAM(s) IV Push every 8 hours PRN Nausea and/or Vomiting      CAPILLARY BLOOD GLUCOSE        I&O's Summary    12 Jul 2024 07:01  -  13 Jul 2024 07:00  --------------------------------------------------------  IN: 100 mL / OUT: 0 mL / NET: 100 mL        PHYSICAL EXAM:  Vital Signs Last 24 Hrs  T(C): 36.3 (13 Jul 2024 06:57), Max: 36.6 (12 Jul 2024 14:24)  T(F): 97.3 (13 Jul 2024 06:57), Max: 97.8 (12 Jul 2024 14:24)  HR: 82 (13 Jul 2024 06:57) (73 - 82)  BP: 114/63 (13 Jul 2024 06:57) (106/69 - 114/63)  BP(mean): --  RR: 18 (13 Jul 2024 06:57) (18 - 18)  SpO2: 94% (13 Jul 2024 06:57) (87% - 94%)    Parameters below as of 13 Jul 2024 06:57  Patient On (Oxygen Delivery Method): nasal cannula  O2 Flow (L/min): 1    CONSTITUTIONAL: NAD, well-developed, well-groomed  RESPIRATORY: Normal respiratory effort; lungs are clear to auscultation bilaterally  CARDIOVASCULAR: Regular rate and rhythm, normal S1 and S2, no murmur/rub/gallop  ABDOMEN: Nontender to palpation, soft, nondistended  PSYCH: Awake and alert, oriented to self only    LABS:                        See note  see note )-----------( See note    ( 13 Jul 2024 07:24 )             See note    07-13    138  |  106  |  18  ----------------------------<  117<H>  4.7   |  20<L>  |  0.72    Ca    9.4      13 Jul 2024 07:24  Phos  3.5     07-13  Mg     2.2     07-13    TPro  5.9<L>  /  Alb  3.3  /  TBili  0.3  /  DBili  x   /  AST  22  /  ALT  11  /  AlkPhos  65  07-12          Urinalysis Basic - ( 13 Jul 2024 07:24 )    Color: x / Appearance: x / SG: x / pH: x  Gluc: 117 mg/dL / Ketone: x  / Bili: x / Urobili: x   Blood: x / Protein: x / Nitrite: x   Leuk Esterase: x / RBC: x / WBC x   Sq Epi: x / Non Sq Epi: x / Bacteria: x

## 2024-07-13 NOTE — PROGRESS NOTE ADULT - PROBLEM SELECTOR PLAN 2
- c/w remdesivir, added Decadron 7/12 due to hypoxia  - Supplemental O2 therapy via NC  - Antitussives and duoneb PRN  - Admitted with sepsis, resolving

## 2024-07-14 ENCOUNTER — TRANSCRIPTION ENCOUNTER (OUTPATIENT)
Age: 89
End: 2024-07-14

## 2024-07-14 LAB
ANION GAP SERPL CALC-SCNC: 10 MMOL/L — SIGNIFICANT CHANGE UP (ref 5–17)
BUN SERPL-MCNC: 23 MG/DL — SIGNIFICANT CHANGE UP (ref 7–23)
CALCIUM SERPL-MCNC: 9.3 MG/DL — SIGNIFICANT CHANGE UP (ref 8.4–10.5)
CHLORIDE SERPL-SCNC: 108 MMOL/L — SIGNIFICANT CHANGE UP (ref 96–108)
CO2 SERPL-SCNC: 24 MMOL/L — SIGNIFICANT CHANGE UP (ref 22–31)
CREAT SERPL-MCNC: 0.93 MG/DL — SIGNIFICANT CHANGE UP (ref 0.5–1.3)
CULTURE RESULTS: SIGNIFICANT CHANGE UP
CULTURE RESULTS: SIGNIFICANT CHANGE UP
EGFR: 59 ML/MIN/1.73M2 — LOW
GLUCOSE SERPL-MCNC: 92 MG/DL — SIGNIFICANT CHANGE UP (ref 70–99)
HCT VFR BLD CALC: 32.4 % — LOW (ref 34.5–45)
HGB BLD-MCNC: 11.2 G/DL — LOW (ref 11.5–15.5)
MCHC RBC-ENTMCNC: 32.8 PG — SIGNIFICANT CHANGE UP (ref 27–34)
MCHC RBC-ENTMCNC: 34.6 GM/DL — SIGNIFICANT CHANGE UP (ref 32–36)
MCV RBC AUTO: 95 FL — SIGNIFICANT CHANGE UP (ref 80–100)
NRBC # BLD: 0 /100 WBCS — SIGNIFICANT CHANGE UP (ref 0–0)
PLATELET # BLD AUTO: 129 K/UL — LOW (ref 150–400)
POTASSIUM SERPL-MCNC: 3.9 MMOL/L — SIGNIFICANT CHANGE UP (ref 3.5–5.3)
POTASSIUM SERPL-SCNC: 3.9 MMOL/L — SIGNIFICANT CHANGE UP (ref 3.5–5.3)
RBC # BLD: 3.41 M/UL — LOW (ref 3.8–5.2)
RBC # FLD: 11.9 % — SIGNIFICANT CHANGE UP (ref 10.3–14.5)
SODIUM SERPL-SCNC: 142 MMOL/L — SIGNIFICANT CHANGE UP (ref 135–145)
SPECIMEN SOURCE: SIGNIFICANT CHANGE UP
SPECIMEN SOURCE: SIGNIFICANT CHANGE UP
WBC # BLD: 9.53 K/UL — SIGNIFICANT CHANGE UP (ref 3.8–10.5)
WBC # FLD AUTO: 9.53 K/UL — SIGNIFICANT CHANGE UP (ref 3.8–10.5)

## 2024-07-14 RX ORDER — ALPRAZOLAM 2 MG/1
0.25 TABLET ORAL ONCE
Refills: 0 | Status: DISCONTINUED | OUTPATIENT
Start: 2024-07-14 | End: 2024-07-14

## 2024-07-14 RX ADMIN — Medication 12.5 MILLIGRAM(S): at 22:22

## 2024-07-14 RX ADMIN — SERTRALINE HYDROCHLORIDE 150 MILLIGRAM(S): 100 TABLET, FILM COATED ORAL at 11:32

## 2024-07-14 RX ADMIN — RIVAROXABAN 15 MILLIGRAM(S): 10 TABLET, FILM COATED ORAL at 11:31

## 2024-07-14 RX ADMIN — Medication 1 APPLICATION(S): at 09:16

## 2024-07-14 RX ADMIN — Medication 88 MICROGRAM(S): at 07:03

## 2024-07-14 RX ADMIN — IPRATROPIUM BROMIDE AND ALBUTEROL SULFATE 3 MILLILITER(S): .5; 3 SOLUTION RESPIRATORY (INHALATION) at 18:53

## 2024-07-14 RX ADMIN — IPRATROPIUM BROMIDE AND ALBUTEROL SULFATE 3 MILLILITER(S): .5; 3 SOLUTION RESPIRATORY (INHALATION) at 07:01

## 2024-07-14 RX ADMIN — SACUBITRIL AND VALSARTAN 1 TABLET(S): 97; 103 TABLET, FILM COATED ORAL at 18:52

## 2024-07-14 RX ADMIN — ALPRAZOLAM 0.25 MILLIGRAM(S): 2 TABLET ORAL at 09:15

## 2024-07-14 RX ADMIN — Medication 50 MILLIGRAM(S): at 07:02

## 2024-07-14 RX ADMIN — TRAZODONE HYDROCHLORIDE 150 MILLIGRAM(S): 50 TABLET, FILM COATED ORAL at 22:22

## 2024-07-14 RX ADMIN — IPRATROPIUM BROMIDE AND ALBUTEROL SULFATE 3 MILLILITER(S): .5; 3 SOLUTION RESPIRATORY (INHALATION) at 13:42

## 2024-07-14 RX ADMIN — DEXAMETHASONE 6 MILLIGRAM(S): 1 TABLET ORAL at 06:56

## 2024-07-14 RX ADMIN — CLOPIDOGREL BISULFATE 75 MILLIGRAM(S): 75 TABLET, FILM COATED ORAL at 11:31

## 2024-07-14 RX ADMIN — SACUBITRIL AND VALSARTAN 1 TABLET(S): 97; 103 TABLET, FILM COATED ORAL at 07:02

## 2024-07-14 RX ADMIN — ATORVASTATIN CALCIUM 40 MILLIGRAM(S): 20 TABLET, FILM COATED ORAL at 22:22

## 2024-07-14 RX ADMIN — Medication 3 MILLIGRAM(S): at 22:27

## 2024-07-14 NOTE — PROGRESS NOTE ADULT - SUBJECTIVE AND OBJECTIVE BOX
[facility] Division of Hospital Medicine  Clarence Miller DO  Reachable on [employer] Teams    Patient is a 89y old  Female who presents with a chief complaint of AMS, cough (13 Jul 2024 13:17)    SUBJECTIVE / OVERNIGHT EVENTS: No acute events overnight. Patient seen and examined at bedside this morning, unable to obtain review of systems due to dementia.    ADDITIONAL REVIEW OF SYSTEMS: Unable to obtain due to dementia.    MEDICATIONS  (STANDING):  albuterol/ipratropium for Nebulization 3 milliLiter(s) Nebulizer every 6 hours  atorvastatin 40 milliGRAM(s) Oral at bedtime  chlorhexidine 2% Cloths 1 Application(s) Topical <User Schedule>  clopidogrel Tablet 75 milliGRAM(s) Oral daily  dexAMETHasone  Injectable 6 milliGRAM(s) IV Push daily  levothyroxine 88 MICROGram(s) Oral daily  metoprolol succinate ER 50 milliGRAM(s) Oral daily  QUEtiapine 12.5 milliGRAM(s) Oral at bedtime  rivaroxaban 15 milliGRAM(s) Oral daily  sacubitril 24 mG/valsartan 26 mG 1 Tablet(s) Oral two times a day  sertraline 150 milliGRAM(s) Oral daily  traZODone 150 milliGRAM(s) Oral at bedtime    MEDICATIONS  (PRN):  acetaminophen     Tablet .. 650 milliGRAM(s) Oral every 6 hours PRN Temp greater or equal to 38C (100.4F), Mild Pain (1 - 3)  aluminum hydroxide/magnesium hydroxide/simethicone Suspension 30 milliLiter(s) Oral every 4 hours PRN Dyspepsia  benzonatate 100 milliGRAM(s) Oral every 8 hours PRN Cough  guaifenesin/dextromethorphan Oral Liquid 10 milliLiter(s) Oral every 4 hours PRN Cough  melatonin 3 milliGRAM(s) Oral at bedtime PRN Insomnia  ondansetron Injectable 4 milliGRAM(s) IV Push every 8 hours PRN Nausea and/or Vomiting      CAPILLARY BLOOD GLUCOSE        I&O's Summary    13 Jul 2024 07:01  -  14 Jul 2024 07:00  --------------------------------------------------------  IN: 25 mL / OUT: 0 mL / NET: 25 mL        PHYSICAL EXAM:  Vital Signs Last 24 Hrs  T(C): 36.2 (14 Jul 2024 07:00), Max: 36.6 (13 Jul 2024 20:38)  T(F): 97.2 (14 Jul 2024 07:00), Max: 97.8 (13 Jul 2024 20:38)  HR: 88 (14 Jul 2024 07:00) (70 - 88)  BP: 117/81 (14 Jul 2024 07:00) (103/60 - 129/78)  BP(mean): --  RR: 18 (14 Jul 2024 07:00) (18 - 18)  SpO2: 92% (14 Jul 2024 07:00) (92% - 92%)    Parameters below as of 14 Jul 2024 07:00  Patient On (Oxygen Delivery Method): room air    CONSTITUTIONAL: NAD, well-developed, well-groomed  RESPIRATORY: Normal respiratory effort; lungs are clear to auscultation bilaterally  CARDIOVASCULAR: Regular rate and rhythm, normal S1 and S2, no murmur/rub/gallop  ABDOMEN: Nontender to palpation, soft, nondistended  PSYCH: Awake and alert, oriented to self only    LABS:                        11.2   9.53  )-----------( 129      ( 14 Jul 2024 09:43 )             32.4     07-14    142  |  108  |  23  ----------------------------<  92  3.9   |  24  |  0.93    Ca    9.3      14 Jul 2024 09:43  Phos  3.5     07-13  Mg     2.2     07-13            Urinalysis Basic - ( 14 Jul 2024 09:43 )    Color: x / Appearance: x / SG: x / pH: x  Gluc: 92 mg/dL / Ketone: x  / Bili: x / Urobili: x   Blood: x / Protein: x / Nitrite: x   Leuk Esterase: x / RBC: x / WBC x   Sq Epi: x / Non Sq Epi: x / Bacteria: x

## 2024-07-14 NOTE — PROGRESS NOTE ADULT - TIME BILLING
- Ordering, reviewing, and interpreting labs, testing, and imaging.  - Independently obtaining a review of systems and performing a physical exam  - Reviewing consultant documentation/recommendations in addition to discussing plan of care with consultants.  - Counselling and educating patient and family regarding interpretation of aforementioned items and plan of care.
Review of tests, imaging, labs, documents, medical management, coordination of care and counseling.

## 2024-07-15 ENCOUNTER — TRANSCRIPTION ENCOUNTER (OUTPATIENT)
Age: 89
End: 2024-07-15

## 2024-07-15 VITALS
RESPIRATION RATE: 18 BRPM | TEMPERATURE: 98 F | SYSTOLIC BLOOD PRESSURE: 103 MMHG | OXYGEN SATURATION: 92 % | DIASTOLIC BLOOD PRESSURE: 72 MMHG | HEART RATE: 72 BPM

## 2024-07-15 LAB — SARS-COV-2 RNA SPEC QL NAA+PROBE: DETECTED

## 2024-07-15 RX ORDER — ALPRAZOLAM 2 MG/1
0.25 TABLET ORAL ONCE
Refills: 0 | Status: DISCONTINUED | OUTPATIENT
Start: 2024-07-15 | End: 2024-07-15

## 2024-07-15 RX ORDER — ALBUTEROL 90 MCG
1 AEROSOL REFILL (GRAM) INHALATION EVERY 6 HOURS
Refills: 0 | Status: DISCONTINUED | OUTPATIENT
Start: 2024-07-15 | End: 2024-07-15

## 2024-07-15 RX ADMIN — CLOPIDOGREL BISULFATE 75 MILLIGRAM(S): 75 TABLET, FILM COATED ORAL at 11:53

## 2024-07-15 RX ADMIN — RIVAROXABAN 15 MILLIGRAM(S): 10 TABLET, FILM COATED ORAL at 11:53

## 2024-07-15 RX ADMIN — ALPRAZOLAM 0.25 MILLIGRAM(S): 2 TABLET ORAL at 14:22

## 2024-07-15 RX ADMIN — SACUBITRIL AND VALSARTAN 1 TABLET(S): 97; 103 TABLET, FILM COATED ORAL at 06:20

## 2024-07-15 RX ADMIN — Medication 50 MILLIGRAM(S): at 06:19

## 2024-07-15 RX ADMIN — DEXAMETHASONE 6 MILLIGRAM(S): 1 TABLET ORAL at 06:19

## 2024-07-15 RX ADMIN — SERTRALINE HYDROCHLORIDE 150 MILLIGRAM(S): 100 TABLET, FILM COATED ORAL at 11:52

## 2024-07-15 RX ADMIN — IPRATROPIUM BROMIDE AND ALBUTEROL SULFATE 3 MILLILITER(S): .5; 3 SOLUTION RESPIRATORY (INHALATION) at 11:53

## 2024-07-15 RX ADMIN — IPRATROPIUM BROMIDE AND ALBUTEROL SULFATE 3 MILLILITER(S): .5; 3 SOLUTION RESPIRATORY (INHALATION) at 00:51

## 2024-07-15 RX ADMIN — Medication 1 APPLICATION(S): at 06:50

## 2024-07-15 RX ADMIN — Medication 88 MICROGRAM(S): at 06:19

## 2024-07-15 RX ADMIN — IPRATROPIUM BROMIDE AND ALBUTEROL SULFATE 3 MILLILITER(S): .5; 3 SOLUTION RESPIRATORY (INHALATION) at 06:14

## 2024-07-15 NOTE — PROGRESS NOTE ADULT - PROBLEM SELECTOR PLAN 9
- Continue home Sertraline, Seroquel, and Trazodone
- EKG to eval QTC  - Continue home Sertraline, Seroquel, and Trazodone

## 2024-07-15 NOTE — PROGRESS NOTE ADULT - PROBLEM SELECTOR PLAN 4
- Continue home metop, KRISTOPHER with xarelto

## 2024-07-15 NOTE — DISCHARGE NOTE PROVIDER - HOSPITAL COURSE
Lisa Vela is an 88 y/o F with PMH significant for CHF, AS s/p TAVR (about 2 years ago) on plavix, paroxysmal Afib/aflutter on xarelto, HTN, HLD, hypothyroidism, and cognitive decline presenting with AMS found to have TME 2/2 COVID 19 PNA on remdesivir, added Decadron 7/12 due to hypoxia. Passed bedside swallow, tolerating diet.       Lisa Vela is an 89 y/o F with PMH significant for CHF, AS s/p TAVR (about 2 years ago) on plavix, paroxysmal Afib/aflutter on xarelto, HTN, HLD, hypothyroidism, and cognitive decline presenting with AMS found to have TME 2/2 COVID 19 PNA on remdesivir, added Decadron 7/12 due to hypoxia. Passed bedside swallow, tolerating diet.  Weaned to room air and tolerating well.  As per attending, no need to continue with duoneb/albuterol or decadron on discharge.  Patient medically cleared for discharge, by Dr. Malone, with PCP follow up.

## 2024-07-15 NOTE — PROGRESS NOTE ADULT - PROBLEM SELECTOR PLAN 3
- S/P TAVR about 2 years ago, Continue Plavix.

## 2024-07-15 NOTE — PROGRESS NOTE ADULT - PROBLEM SELECTOR PLAN 8
- Continue home meds

## 2024-07-15 NOTE — DISCHARGE NOTE PROVIDER - NSDCCPCAREPLAN_GEN_ALL_CORE_FT
PRINCIPAL DISCHARGE DIAGNOSIS  Diagnosis: COVID  Assessment and Plan of Treatment: s/p remdesivir and decadron  Follow up with your primary medical doctor within 2-3 days of discharge - please call to make an appointment.      SECONDARY DISCHARGE DIAGNOSES  Diagnosis: Toxic metabolic encephalopathy  Assessment and Plan of Treatment: Resolved  Follow up with your primary medical doctor within 2-3 days of discharge.    Diagnosis: Paroxysmal atrial fibrillation  Assessment and Plan of Treatment: Atrial fibrillation is the most common heart rhythm problem.  The condition puts you at risk for has stroke and heart attack  It helps if you control your blood pressure, not drink more than 1-2 alcohol drinks per day, cut down on caffeine, getting treatment for over active thyroid gland, and get regular exercise  Call your doctor if you feel your heart racing or beating unusually, chest tightness or pain, lightheaded, faint, shortness of breath especially with exercise  It is important to take your heart medication as prescribed  You may be on anticoagulation which is very important to take as directed - you may need blood work to monitor drug levels      Diagnosis: Chronic combined systolic and diastolic heart failure  Assessment and Plan of Treatment: Weigh yourself daily.  If you gain 3lbs in 3 days, or 5lbs in a week call your Health Care Provider.  Do not eat or drink foods containing more than 2000mg of salt (sodium) in your diet every day.  Call your Health Care Provider if you have any swelling or increased swelling in your feet, ankles, and/or stomach.  Take all of your medication as directed.  If you become dizzy call your Health Care Provider.

## 2024-07-15 NOTE — PROGRESS NOTE ADULT - PROBLEM SELECTOR PLAN 7
- Continue home synthroid 88 mcg QD

## 2024-07-15 NOTE — DISCHARGE NOTE NURSING/CASE MANAGEMENT/SOCIAL WORK - NSDCFUADDAPPT_GEN_ALL_CORE_FT
You must follow up with your primary medical doctor within 2-3 days of discharge - please call to make an appointment.        APPTS ARE READY TO BE MADE: [X ] YES    Best Family or Patient Contact (if needed):    Additional Information about above appointments (if needed):    1: Primary medical doctor  2:   3:     Other comments or requests:

## 2024-07-15 NOTE — PROGRESS NOTE ADULT - PROBLEM SELECTOR PLAN 1
- CTH without acute intracranial abnormalities. No focal deficit on exam.   - Likely 2/2 infection given + COVID 19. Management per below  - Aspiration and fall precautions  - Passed bedside swallow, OK for diet
Now resolved.   - CTH without acute intracranial abnormalities. No focal deficits on exam.   - Likely 2/2 infection given + COVID 19. Management per below  - Aspiration and fall precautions
- CTH without acute intracranial abnormalities. No focal deficit on exam.   - Likely 2/2 infection given + COVID 19. Management per below  - Aspiration and fall precautions  - Passed bedside swallow, OK for diet
- CTH without acute intracranial abnormalities. No focal deficit on exam.   - Likely 2/2 infection given + COVID 19. Management per below  - Aspiration and fall precautions  - Passed bedside swallow, OK for diet
- CTH without acute intracranial abnormalities. No focal deficit on exam.   - Likely 2/2 infection given + COVID 19. Management per below  - Aspiration and fall precautions  - Passed bedside swallow, OK for diet  - PT eval once mental status improves
- CTH without acute intracranial abnormalities. No focal deficit on exam.   - Likely 2/2 infection given + COVID 19. Management per below  - Aspiration and fall precautions  - Passed bedside swallow, OK for diet

## 2024-07-15 NOTE — PROGRESS NOTE ADULT - PROBLEM SELECTOR PROBLEM 9
Chronic mood disorder

## 2024-07-15 NOTE — DISCHARGE NOTE NURSING/CASE MANAGEMENT/SOCIAL WORK - PATIENT PORTAL LINK FT
You can access the FollowMyHealth Patient Portal offered by [facility] by registering at the following website: [email or web address]. By joining [email or web address]’s FollowMyHealth portal, you will also be able to view your health information using other applications (apps) compatible with our system.

## 2024-07-15 NOTE — CHART NOTE - NSCHARTNOTEFT_GEN_A_CORE
Notified by RN of patient with temp of 102.7 orally and HR of 114.  As per RN, patient is able to swallow, but is not eating or drinking.  BP noted to be 128/72 and O2 sat is 93% on 2LNC.  Patient appears comfortable and in no acute distress.  Oral Tylenol given.  Discussed patient's case with covering hospitalist, Dr. Stokes, who recommends to stop oral lasix and to place patient on a cooling blanket.  At this time, Dr. Stokes recommends to hold off on starting IV fluids, but to monitor vital signs overnight, notify the covering hospitalist of any changes, and to consider holding entresto in am if blood pressure decreases.  Signout will be given to night ACP to monitor patient closely.
Request from Dr. Malone to facilitate patient discharge.  Medication reconciliation reviewed, revised, and resolved with Dr. Malone, who has medically cleared patient for discharge with follow up as advised.  Please refer to discharge note for detailed hospital course.
Confidential Drug Utilization Report  Search Terms: Lisa Vela, 1935Search Date: 07/12/2024 00:58:05 AM  The Drug Utilization Report below displays all of the controlled substance prescriptions, if any, that your patient has filled in the last twelve months. The information displayed on this report is compiled from pharmacy submissions to the Department, and accurately reflects the information as submitted by the pharmacies.    This report was requested by: Coni Meyer | Reference #: [id number]    You have not added a LACEY number. Keeping your LACEY number(s) up to date on the My LACEY # tab will enable the separation of your prescriptions from others in the search results.    Practitioner Count: 1  Pharmacy Count: 1  Current Opioid Prescriptions: 0  Current Benzodiazepine Prescriptions: 1  Current Stimulant Prescriptions: 0      Patient Demographic Information (PDI)       PDI	First Name	Last Name	Birth Date	Gender	Street Address	[facility]	Zip Code  NABEEL Vela	1935	Female	[address] [facility]	[address]    Prescription Information      PDI Filter:    PDI	Current Rx	Drug Type	Rx Written	Rx Dispensed	Drug	Quantity	Days Supply	Prescriber Name	Prescriber LACEY #	Payment Method	Dispenser  A	Y	B	06/27/2024	06/28/2024	alprazolam 0.25 mg tablet	60	30	Maximiliano Phan MD	[id number]	Medicare	[id number]  A	N	B	11/30/2023	12/08/2023	alprazolam 0.25 mg tablet	60	30	Maximiliano Phan MD	[id number]	Medicare	[id number]
Patient will require a polyfly wheelchair due to COVID and ESRD s/p kidney transplant with recurrent UTI_. The beneficiary has a mobility limitation that significantly impairs his ability to participate in one or more MRADLs such as toileting, feeding, dressing, grooming, and bathing in customary locations in the home. The patient’s mobility limitation cannot be sufficiently resolved by the use of an appropriately fitted cane or walker. The patient is unable to ambulated with a walker. Use of a manual wheelchair will significantly improve the beneficiary’s ability to participate in MRADLs and the beneficiary will use it on a regular basis in the home. The beneficiary is able and willing to use the wheelchair in the home. The beneficiary cannot self-propel in a standard wheelchair. The patient can self-propel in a polyfly wheelchair. The beneficiary has sufficient upper extremity function and other physical and mental capabilities needed to safely selfpropel the manual lightweight wheelchair that is provided in the home during a typical day.

## 2024-07-15 NOTE — PROGRESS NOTE ADULT - SUBJECTIVE AND OBJECTIVE BOX
Shayne Malone MD  Division of Hospital Medicine  Available via MS teams  ---------------------------------------------------------    BERT VAN  89y  Female      Patient is a 89y old  Female who presents with a chief complaint of AMS, cough (15 Jul 2024 03:23)      INTERVAL HPI/OVERNIGHT EVENTS:  Seen at bedside. Aide present. Aide states patient is tied, but at her baseline          T(C): 36.3 (07-15-24 @ 05:30), Max: 36.5 (07-14-24 @ 18:00)  HR: 73 (07-15-24 @ 06:00) (59 - 73)  BP: 107/66 (07-15-24 @ 05:30) (97/61 - 112/72)  RR: 18 (07-15-24 @ 05:30) (18 - 18)  SpO2: 95% (07-15-24 @ 05:30) (92% - 95%)  Wt(kg): --Vital Signs Last 24 Hrs  T(C): 36.3 (15 Jul 2024 05:30), Max: 36.5 (14 Jul 2024 18:00)  T(F): 97.4 (15 Jul 2024 05:30), Max: 97.7 (14 Jul 2024 18:00)  HR: 73 (15 Jul 2024 06:00) (59 - 73)  BP: 107/66 (15 Jul 2024 05:30) (97/61 - 112/72)  BP(mean): --  RR: 18 (15 Jul 2024 05:30) (18 - 18)  SpO2: 95% (15 Jul 2024 05:30) (92% - 95%)    Parameters below as of 15 Jul 2024 05:30  Patient On (Oxygen Delivery Method): room air        PHYSICAL EXAM:  GENERAL: NAD, well-groomed, well-developed  NECK: Supple, No JVD  CHEST/LUNG: Clear to auscultation bilaterally; No rales, rhonchi, wheezing, or rubs  HEART: Regular rate and rhythm; No murmurs, rubs, or gallops  ABDOMEN: Soft, Nontender, Nondistended; Bowel sounds present.    EXTREMITIES:  2+ Peripheral Pulses, No clubbing, cyanosis, or edema          LABS:                        11.2   9.53  )-----------( 129      ( 14 Jul 2024 09:43 )             32.4     07-14    142  |  108  |  23  ----------------------------<  92  3.9   |  24  |  0.93    Ca    9.3      14 Jul 2024 09:43        Urinalysis Basic - ( 14 Jul 2024 09:43 )    Color: x / Appearance: x / SG: x / pH: x  Gluc: 92 mg/dL / Ketone: x  / Bili: x / Urobili: x   Blood: x / Protein: x / Nitrite: x   Leuk Esterase: x / RBC: x / WBC x   Sq Epi: x / Non Sq Epi: x / Bacteria: x      CAPILLARY BLOOD GLUCOSE            Urinalysis Basic - ( 14 Jul 2024 09:43 )    Color: x / Appearance: x / SG: x / pH: x  Gluc: 92 mg/dL / Ketone: x  / Bili: x / Urobili: x   Blood: x / Protein: x / Nitrite: x   Leuk Esterase: x / RBC: x / WBC x   Sq Epi: x / Non Sq Epi: x / Bacteria: x        RADIOLOGY & ADDITIONAL TESTS:    Imaging Personally Reviewed:  [ ] YES  [ ] NO

## 2024-07-15 NOTE — PROGRESS NOTE ADULT - NSPROGADDITIONALINFOA_GEN_ALL_CORE
Discussed with patient's daughter Liza and 4 DSU ACP.
Discussed with patient's aide, 4 DSU ACP.
Discussed with patient, daughter, aide and 4 DSU ACP.
discussed with daughter over the phone
Medically stable for d/c home today    d/c time 32 minutes
Discussed with patient, daughter Liza and 4 DSU ACP.

## 2024-07-15 NOTE — PROGRESS NOTE ADULT - PROBLEM SELECTOR PLAN 2
- s/p course of remdesivir,   - added Decadron 7/12 due to hypoxia. Continue with hospitalized.   - Supplemental O2 therapy via NC  - Antitussives and duoneb PRN  - Admitted with sepsis, resolved

## 2024-07-15 NOTE — PROGRESS NOTE ADULT - PROBLEM SELECTOR PROBLEM 10
Cognitive decline

## 2024-07-15 NOTE — PROGRESS NOTE ADULT - PROBLEM SELECTOR PROBLEM 5
Chronic combined systolic and diastolic heart failure

## 2024-07-15 NOTE — PROGRESS NOTE ADULT - PROBLEM SELECTOR PLAN 10
- Continue home Sertraline, Seroquel, and Trazodone

## 2024-07-15 NOTE — DISCHARGE NOTE PROVIDER - NSDCFUADDAPPT_GEN_ALL_CORE_FT
You must follow up with your primary medical doctor within 2-3 days of discharge - please call to make an appointment.        APPTS ARE READY TO BE MADE: [X ] YES    Best Family or Patient Contact (if needed):    Additional Information about above appointments (if needed):    1: Primary medical doctor  2:   3:     Other comments or requests:    You must follow up with your primary medical doctor within 2-3 days of discharge - please call to make an appointment.        APPTS ARE READY TO BE MADE: [X ] YES    Best Family or Patient Contact (if needed):    Additional Information about above appointments (if needed):    1: Primary medical doctor  2:   3:     Other comments or requests:   Patient is being discharged to rehab/hospice. Caregiver will arrange follow up.

## 2024-07-15 NOTE — PROGRESS NOTE ADULT - PROBLEM SELECTOR PLAN 6
- Continue home Lipitor, holding home zetia

## 2024-07-15 NOTE — PROGRESS NOTE ADULT - ASSESSMENT
Lisa Vela is an 88 y/o F with PMH significant for CHF, AS s/p TAVR (about 2 years ago) on plavix, paroxysmal Afib/aflutter on xarelto, HTN, HLD, hypothyroidism, and cognitive decline presenting with AMS found to have TME 2/2 COVID 19. 
Lisa Vela is an 89 y/o F with PMH significant for CHF, AS s/p TAVR (about 2 years ago) on plavix, paroxysmal Afib/aflutter on xarelto, HTN, HLD, hypothyroidism, and cognitive decline presenting with AMS found to have TME 2/2 COVID 19. 
Lisa Vela is an 88 y/o F with PMH significant for CHF, AS s/p TAVR (about 2 years ago) on plavix, paroxysmal Afib/aflutter on xarelto, HTN, HLD, hypothyroidism, and cognitive decline presenting with AMS found to have TME 2/2 COVID 19. 
Lisa Vela is an 88 y/o F with PMH significant for CHF, AS s/p TAVR (about 2 years ago) on plavix, paroxysmal Afib/aflutter on xarelto, HTN, HLD, hypothyroidism, and cognitive decline presenting with AMS. Patient recently moved into the [place] on 7/3. Yesterday, care staff noted patient to be more lethargic and less responsive than normal accompanied by cough and vomiting. Found to have TME 2/2 COVID 19. 
Lisa Vela is an 89 y/o F with PMH significant for CHF, AS s/p TAVR (about 2 years ago) on plavix, paroxysmal Afib/aflutter on xarelto, HTN, HLD, hypothyroidism, and cognitive decline presenting with AMS found to have TME 2/2 COVID 19. 
Lisa Vela is an 89 y/o F with PMH significant for CHF, AS s/p TAVR (about 2 years ago) on plavix, paroxysmal Afib/aflutter on xarelto, HTN, HLD, hypothyroidism, and cognitive decline presenting with AMS found to have TME 2/2 COVID 19.

## 2024-07-15 NOTE — PROGRESS NOTE ADULT - PROBLEM/PLAN-5
DISPLAY PLAN FREE TEXT
46.9

## 2024-07-15 NOTE — DISCHARGE NOTE PROVIDER - NSDCMRMEDTOKEN_GEN_ALL_CORE_FT
ascorbic acid 500 mg oral tablet: 1 tab(s) orally once a day  atorvastatin 40 mg oral tablet: 1 tab(s) orally once a day  cholecalciferol 25 mcg (1000 intl units) oral tablet: 1 tab(s) orally once a day  clopidogrel 75 mg oral tablet: 1 tab(s) orally once a day  Entresto 24 mg-26 mg oral tablet: 1 tab(s) orally 2 times a day  ezetimibe 10 mg oral tablet: 1 tab(s) orally once a day  ferrous sulfate (as elemental iron) 45 mg oral tablet, extended release: 1 tab(s) orally once a day  furosemide 20 mg oral tablet: 1 tab(s) orally on M, W, Thurs, Sat, Sun; 2 tab(s) orally on Tues, Fri  levothyroxine 88 mcg (0.088 mg) oral tablet: 1 tab(s) orally once a day  melatonin 10 mg oral tablet: 1 tab(s) orally once a day  methenamine hippurate 1 g oral tablet: 1 tab(s) orally once a day  metoprolol succinate 50 mg oral tablet, extended release: 1 tab(s) orally once a day  Abdi Laxatives Caplets: 2 pills M, W, F  Probiotic Formula (Bacillus Coagulans) oral capsule: 1 cap(s) orally once a day  QUEtiapine 25 mg oral tablet: 0.5 tab(s) orally once a day  rivaroxaban 15 mg oral tablet: 1 tab(s) orally once a day  sertraline 150 mg oral capsule: 1 cap(s) orally once a day  traZODone 150 mg oral tablet: 1 tab(s) orally once a day  Xanax 0.25 mg oral tablet: 1 tab(s) orally once a day

## 2024-07-15 NOTE — PROGRESS NOTE ADULT - PROBLEM SELECTOR PLAN 5
HISTORY AND PHYSICAL  (Hospitalist, Internal Medicine)  IDENTIFYING INFORMATION   PATIENT:  Meghana Street  MRN:  [id number]  ADMIT DATE: 3/17/2021      CHIEF COMPLAINT   Abdominal pain, difficulty urinating, constipation    HISTORY OF PRESENT ILLNESS   Meghana Street is a 71 y.o. male with hypertension, diabetes mellitus type 2, not on chronic insulin was brought to ED by his family with the above complaints. Most of the information was provided with patient's daughter-in-law at bedside. According to her patient has been having trouble urinating since Sunday, was dribbling. Also complained of abdominal pain, abdominal distention which was progressively getting worse since Sunday. Patient mainly complained of left upper quadrant, epigastric pain, had poor appetite, denied any nausea, had one episode of vomiting, denied any fever, chills, had poor oral intake. Did not have a bowel movement since Sunday. Patient was in his usual health until Sunday. Denied any chest pain, shortness of breath, cough. Vitals in ED-/100, , RR 18, temperature 97.3, saturating 98% on room air. Lab work significant for sodium 132, chloride 97, BUN 43, creatinine 2, lactic acid 2.6, proBNP 1509, troponin 0.064, WBC 16.6, hemoglobin 16.7, platelets 394. UA-large blood, leukocyte esterase negative, nitrite negative, bacteria negative. EKG-sinus tachycardia, left axis deviation. CTA abdomen/pelvis-nonocclusive plaque in the proximal aspect of the superior mesenteric artery leads to moderate to high-grade stenosis, bilateral perinephric stranding and periureteral fat stranding, moderate to marked urinary bladder distention. Marked prostatic hypertrophy. Patient had Bingham catheter placed in ER and 1200 cc urine drained.     PAST MEDICAL HISTORY PAST SURGICAL HISTORY   Hypertension, diabetes mellitus type 2  none significant   FAMILY HISTORY SOCIAL HISTORY    Noncontributory   no smoking, occasional alcohol-beer,
denied any illicit drug abuse   MEDICATIONS ALLERGIES    Losartan 12.5 mg daily, Metformin, glipizide   no known drug allergies. PAST MEDICAL, SURGICAL, FAMILY, and SOCIAL HISTORY         Past Medical History:   Diagnosis Date    Diabetes mellitus ([facility] [address]      No past surgical history on file. No family history on file. Family Hx of HTN  Family Hx as reviewed above, otherwise non-contributory  Social History     Socioeconomic History    Marital status:      Spouse name: Not on file    Number of children: Not on file    Years of education: Not on file    Highest education level: Not on file   Occupational History    Not on file   Social Needs    Financial resource strain: Not on file    Food insecurity     Worry: Not on file     Inability: Not on file    Transportation needs     Medical: Not on file     Non-medical: Not on file   Tobacco Use    Smoking status: Never Smoker    Smokeless tobacco: Never Used   Substance and Sexual Activity    Alcohol use: Never     Frequency: Never    Drug use: Not on file    Sexual activity: Not on file   Lifestyle    Physical activity     Days per week: Not on file     Minutes per session: Not on file    Stress: Not on file   Relationships    Social connections     Talks on phone: Not on file     Gets together: Not on file     Attends [employer] service: Not on file     Active member of club or organization: Not on file     Attends meetings of clubs or organizations: Not on file     Relationship status: Not on file    Intimate partner violence     Fear of current or ex partner: Not on file     Emotionally abused: Not on file     Physically abused: Not on file     Forced sexual activity: Not on file   Other Topics Concern    Not on file   Social History Narrative    Not on file       MEDICATIONS   Medications Prior to Admission  Not in a hospital admission.     Current Medications  Current Facility-Administered Medications   Medication Dose Route
- Unsure of baseline EF. No evidence of acute exacerbation at this time.   - GDMT: Continue home Entresto and metoprolol  - Diuresis with home lasix 20 mg PO qd
Frequency Provider Last Rate Last Admin    0.9 % sodium chloride infusion   Intravenous [facility] [place] [facility]/hr at 03/17/21 1740 New Bag at 03/17/21 1740    sodium chloride flush 0.9 % injection 10 mL  10 mL Intravenous BID Alexander Anderson RN        metronidazole (FLAGYL) 500 mg in NaCl 100 mL IVPB premix  500 mg Intravenous Once Felizardo Flattery, [place]        [START ON 3/18/2021] tamsulosin (FLOMAX) capsule 0.4 mg  0.4 mg Oral Daily Yesica Reynoso MD         No current outpatient medications on file. Allergies  No Known Allergies    REVIEW OF SYSTEMS   Within above limitations. 14 point review of systems reviewed. Pertinent positive or negative as per HPI or otherwise negative per 14 point systems review. PHYSICAL EXAM     Wt Readings from Last 3 Encounters:   03/17/21 154 lb 5.2 oz (70 kg)       Blood pressure (!) 148/81, pulse 101, temperature 99.2 °F (37.3 °C), temperature source Oral, resp. rate 18, height 5' 6\" (1.676 m), weight 154 lb 5.2 oz (70 kg), SpO2 95 %. GEN  -Awake, alert, NAD.   EYES   -PERRL. HENT  -MM are moist.   RESP  -LS CTA equal bilat, no wheezes, rales or rhonchi. Symmetric chest movement. No respiratory distress noted. C/V  -S1/S2 auscultated, tachycardia without appreciable M/R/G. No JVD or carotid bruits. Peripheral pulses equal bilaterally and palpable. No peripheral edema. No reproducible chest wall tenderness. GI  -Abdomen is soft, non-distended, no significant tenderness. No masses or guarding. + BS in all quadrants. Rectal exam deferred.   -No CVA tenderness. Bingham catheter is present. MS  -B/L extremities strong muscles strength. Full movements. No gross joint deformities. No swelling, intact sensation symmetrical.   SKIN  -Normal coloration, warm, dry. NEURO  - Awake, alert, oriented x 4, no neurological deficit. LABS AND IMAGING     Results for Estefania Driver (MRN [id number]) as of 3/17/2021 23:23   Ref.  Range 3/17/2021
17:00   Sodium Latest Ref Range: 135 - 145 MMOL/L 132 (L)   Potassium Latest Ref Range: 3.5 - 5.1 MMOL/L 4.7   Chloride Latest Ref Range: 99 - 110 mMol/L 97 (L)   CO2 Latest Ref Range: 21 - 32 MMOL/L 22   BUN Latest Ref Range: 6 - 23 MG/DL 43 (H)   Creatinine Latest Ref Range: 0.9 - 1.3 MG/DL 2.0 (H)   Anion Gap Latest Ref Range: 4 - 16  13   GFR Non- Latest Ref Range: >60 mL/min/1.73m2 33 (L)   GFR  Latest Ref Range: >60 mL/min/1.73m2 40 (L)   Glucose Latest Ref Range: 70 - 99 MG/ (H)   Calcium Latest Ref Range: 8.3 - 10.6 MG/DL 9.6   Total Protein Latest Ref Range: 6.4 - 8.2 GM/DL 7.0   Pro-BNP Latest Ref Range: <300 PG/ML 1,509 (H)   Troponin T Latest Ref Range: <0.01 NG/ML 0.064 (H)   Albumin Latest Ref Range: 3.4 - 5.0 GM/DL 3.4   Alk Phos Latest Ref Range: 40 - 129 IU/L 61   ALT Latest Ref Range: 10 - 40 U/L 19   AST Latest Ref Range: 15 - 37 IU/L 18   Bilirubin Latest Ref Range: 0.0 - 1.0 MG/DL 1.0   Lipase Latest Ref Range: 13 - 60 IU/L 15   WBC Latest Ref Range: 4.0 - 10.5 K/CU MM 16.6 (H)   RBC Latest Ref Range: 4.6 - 6.2 M/CU MM 5.85   Hemoglobin Quant Latest Ref Range: 13.5 - 18.0 GM/DL 16.7   Hematocrit Latest Ref Range: 42 - 52 % 50.9   MCV Latest Ref Range: 78 - 100 FL 87.0   MCH Latest Ref Range: 27 - 31 PG 28.5   MCHC Latest Ref Range: 32.0 - 36.0 % 32.8   MPV Latest Ref Range: 7.5 - 11.1 FL 8.9   RDW Latest Ref Range: 11.7 - 14.9 % 13.6   Platelet Count Latest Ref Range: 140 - 440 K/CU    Lymphocyte % Latest Ref Range: 24 - 44 % 8.0 (L)   Monocytes % Latest Ref Range: 0 - 4 % 5.0 (H)   Lymphocytes Absolute Latest Units: K/CU MM 1.3   Monocytes Absolute Latest Units: K/CU MM 0.8   Differential Type Unknown MANUAL DIFFERENTIAL   Segs Relative Latest Ref Range: 36 - 66 % 86.0 (H)   Segs Absolute Latest Units: K/CU MM 14.3   Bands Relative Latest Ref Range: 5 - 11 % 1 (L)   Bands Absolute Latest Units: K/CU MM 0.17     Results for Abdiel OviedoMRN
- Unsure of baseline EF. No evidence of acute exacerbation at this time.   - GDMT: Continue home Entresto and metoprolol  - Diuresis with home lasix 20 mg PO qd
[id number]) as of 3/17/2021 23:23   Ref. Range 3/17/2021 19:39   Color, UA Latest Ref Range: YELLOW  YELLOW   Clarity, UA Latest Ref Range: CLEAR  CLEAR   Bilirubin, Urine Latest Ref Range: NEGATIVE MG/DL NEGATIVE   Ketones, Urine Latest Ref Range: NEGATIVE MG/DL NEGATIVE   Specific Gravity, UA Latest Ref Range: 1.001 - 1.035  1.011   Blood, Urine Latest Ref Range: NEGATIVE  LARGE (A)   Protein, UA Latest Ref Range: NEGATIVE MG/DL NEGATIVE   Urobilinogen, Urine Latest Ref Range: 0.2 - 1.0 MG/DL NEGATIVE   Leukocyte Esterase, Urine Latest Ref Range: NEGATIVE  NEGATIVE   Glucose, Urine Latest Ref Range: NEGATIVE MG/DL >500 (A)   Nitrite Urine, Quantitative Latest Ref Range: NEGATIVE  NEGATIVE   pH, Urine Latest Ref Range: 5.0 - 8.0  5.0   Mucus, UA Latest Ref Range: NEGATIVE HPF RARE (A)   WBC, UA Latest Ref Range: 0 - 2 /HPF 1   RBC, UA Latest Ref Range: 0 - 3 /HPF 8 (H)   Bacteria, UA Latest Ref Range: NEGATIVE /HPF NEGATIVE   Trichomonas, UA Latest Ref Range: NONE SEEN /HPF NONE SEEN     Recent Imaging    CTA ABDOMEN PELVIS W CONTRAST [[id number]] Collected: 03/17/21 1951      Order Status: Completed Updated: 03/17/21 2002     Narrative:       EXAMINATION:   CTA OF THE ABDOMEN AND PELVIS WITH CONTRAST 3/17/2021 4:04 pm     TECHNIQUE:   CTA of the abdomen and pelvis was performed with the administration of   intravenous contrast. Multiplanar reformatted images are provided for review. MIP images are provided for review. Dose modulation, iterative   reconstruction, and/or weight based adjustment of the mA/kV was utilized to   reduce the radiation dose to as low as reasonably achievable. COMPARISON:   None.      HISTORY:   ORDERING SYSTEM PROVIDED HISTORY: abd pain, constipation, vomiting   TECHNOLOGIST PROVIDED HISTORY:   Reason for exam:->abd pain, constipation, vomiting   Decision Support Exception->Emergency Medical Condition (MA)   Reason for Exam: midline upper abd pain     FINDINGS:   VASCULAR STRUCTURES:
- Unsure of baseline EF. No evidence of acute exacerbation at this time.   - GDMT: Continue home Entresto and metoprolol  - Diuresis with home lasix 20 mg PO qd
The abdominal aorta is normal in caliber without evidence of dissection. The celiac artery and its branches are widely patent.  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  The degree of narrowing is approximately 60-70%. The distal aspect of the SMA does enhance.  The inferior mesenteric artery is   diminutive but patent. The renal arteries are patent bilaterally.  The iliac systems are widely   patent bilaterally. NONVASCULAR STRUCTURES:     Lower Chest: There is dependent atelectasis noted within the lungs   bilaterally.  Base of the heart is unremarkable.  Visualized extra thoracic   soft tissues are unremarkable. Organs: There is diffuse hepatic steatosis.  No hypervascular abnormalities   are seen within the liver.  The gallbladder is unremarkable.  Normal arterial   phase imaging of the spleen and adrenals.  Pancreas is unremarkable. There is bilateral perinephric fat stranding.  Mild periureteral fat   stranding is noted as well.  No hydronephrosis is found. GI/Bowel: No large bowel abnormalities are identified.  The appendix is   normal.     There is mild mural thickening of the distal esophagus, raising the   possibility of esophagitis.  Otherwise, the distal esophagus, stomach,   duodenal sweep, and the remainder of the small bowel are unremarkable in   appearance. Pelvis: There is moderate to marked urinary bladder distention, with urinary   bladder measuring 15 cm in cranial caudal dimension.  There is marked   prostatic hypertrophy.  No free pelvic fluid is found. Peritoneum/Retroperitoneum: No retroperitoneal lymphadenopathy.      Bones/Soft Tissues: No osteolytic or osteoblastic bone lesions are   identified.  No acute bony abnormalities are detected.      Impression:       Nonocclusive plaque in the proximal aspect of the superior mesenteric artery   leads to moderate to
high-grade stenosis.  Correlate with any clinical   evidence of chronic mesenteric ischemia. Bilateral perinephric fat stranding and periureteral fat stranding. Correlate with clinical evidence of urinary tract infection. Moderate to marked urinary bladder distention, likely on the basis of bladder   outlet obstruction.      XR ABDOMEN (KUB) (SINGLE AP VIEW) [[id number]] Updated: 03/17/21 1900     Order Status: Canceled          Relevant labs and imaging reviewed    ASSESSMENT AND PLAN     #. Urinary retention:  -S/p Bingham placement  -Urology consult    #. Postobstructive uropathy secondary to enlarged prostate:  -Patient had distended bladder at admission-s/p Bingham-drained 1200 cc urine.  -Patient has NOE. -Monitor closely for postobstructive diuresis. Match I/O. #.  NOE: Post renal due to obstruction  -Patient's baseline renal function-normal -0.8-11/2020  -Creatinine 2 today  -Monitor with repeat BMP    #.  Mild hyponatremia. #.  Leukocytosis, elevated lactic acid:  -No signs and symptoms of infection  -Resolved with IV fluids  -Patient received IV ceftriaxone, metronidazole in ER  -Hold off antibiotics    #. Detectable troponin: Possibly secondary to NOE  -Patient denied any chest pain, shortness of breath  -EKG with no acute ST-T wave changes  -Trend troponin, consult cardiology as needed. #.  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 Michelle Montoya. -Consult GI    #. Distal esophageal thickening:  -Patient denied any reflux symptoms  -Consult GI    #.  Prostatic hypertrophy on CT  -Started tamsulosin. #.  Constipation:  -Patient admits to having poor oral intake since Sunday.  -As needed MiraLAX    #.   Diabetes mellitus type 2, not on chronic insulin:  -Patient is on Metformin, glipizide  -Insulin sliding scale with hypoglycemia
- Unsure of baseline EF. No evidence of acute exacerbation at this time.   - GDMT: Continue home Entresto and metoprolol  - Diuresis with home lasix 20 mg PO qd
protocol  -RiV4b-9.9-11/2020    #. Hypertension:  -Patient on losartan 12.5 mg daily-hold due to NOE  -Patient had cough with lisinopril. DVT Prophylaxis: Heparin  GI Prophylaxis: Not indicated  Code Status: full.       Case d/w ED physician    Anjana Lassiter MD  Hospitalist, Internal Medicine  3/17/2021 at 9:50 PM
- Unsure of baseline EF. No evidence of acute exacerbation at this time.   - GDMT: Continue home Entresto and metoprolol  - Diuresis with home lasix 20 mg PO qd
- Unsure of baseline EF. No evidence of acute exacerbation at this time.   - GDMT: Continue home Entresto and metoprolol  - Diuresis with home lasix 20 mg PO qd

## 2024-07-18 PROBLEM — I48.0 PAROXYSMAL ATRIAL FIBRILLATION: Chronic | Status: ACTIVE | Noted: 2024-07-09

## 2024-07-18 PROBLEM — F39 UNSPECIFIED MOOD [AFFECTIVE] DISORDER: Chronic | Status: ACTIVE | Noted: 2024-07-09

## 2024-07-18 PROBLEM — N39.0 URINARY TRACT INFECTION, SITE NOT SPECIFIED: Chronic | Status: ACTIVE | Noted: 2024-07-09

## 2024-07-18 PROBLEM — E78.5 HYPERLIPIDEMIA, UNSPECIFIED: Chronic | Status: ACTIVE | Noted: 2024-07-09

## 2024-07-18 PROBLEM — I10 ESSENTIAL (PRIMARY) HYPERTENSION: Chronic | Status: ACTIVE | Noted: 2024-07-09

## 2024-07-18 PROBLEM — I50.42 CHRONIC COMBINED SYSTOLIC (CONGESTIVE) AND DIASTOLIC (CONGESTIVE) HEART FAILURE: Chronic | Status: ACTIVE | Noted: 2024-07-09

## 2024-07-18 PROBLEM — R41.89 OTHER SYMPTOMS AND SIGNS INVOLVING COGNITIVE FUNCTIONS AND AWARENESS: Chronic | Status: ACTIVE | Noted: 2024-07-09

## 2024-07-18 PROBLEM — I35.0 NONRHEUMATIC AORTIC (VALVE) STENOSIS: Chronic | Status: ACTIVE | Noted: 2024-07-09

## 2024-07-18 PROBLEM — E03.9 HYPOTHYROIDISM, UNSPECIFIED: Chronic | Status: ACTIVE | Noted: 2024-07-09

## 2024-07-18 RX ORDER — LEVOTHYROXINE SODIUM 25 MCG
1 TABLET ORAL
Refills: 0 | DISCHARGE

## 2024-07-18 RX ORDER — METOPROLOL TARTRATE 50 MG
1 TABLET ORAL
Refills: 0 | DISCHARGE

## 2024-07-18 RX ORDER — RIVAROXABAN 10 MG/1
1 TABLET, FILM COATED ORAL
Refills: 0 | DISCHARGE

## 2024-07-18 RX ORDER — SERTRALINE HYDROCHLORIDE 100 MG/1
1 TABLET, FILM COATED ORAL
Refills: 0 | DISCHARGE

## 2024-07-18 RX ORDER — CLOPIDOGREL BISULFATE 75 MG/1
1 TABLET, FILM COATED ORAL
Refills: 0 | DISCHARGE

## 2024-07-18 RX ORDER — ATORVASTATIN CALCIUM 20 MG/1
1 TABLET, FILM COATED ORAL
Refills: 0 | DISCHARGE

## 2024-07-18 RX ORDER — TRAZODONE HYDROCHLORIDE 50 MG/1
1 TABLET, FILM COATED ORAL
Refills: 0 | DISCHARGE

## 2024-07-18 RX ORDER — FERROUS SULFATE 325(65) MG
1 TABLET ORAL
Refills: 0 | DISCHARGE

## 2024-07-18 RX ORDER — METHENAMINE HIPPURATE 1 G
1 TABLET ORAL
Refills: 0 | DISCHARGE

## 2024-07-18 RX ORDER — ALPRAZOLAM 2 MG/1
1 TABLET ORAL
Refills: 0 | DISCHARGE

## 2024-07-18 RX ORDER — BACILLUS COAGULANS/INULIN 21B-1 G
1 TABLET,CHEWABLE ORAL
Refills: 0 | DISCHARGE

## 2024-07-18 RX ORDER — EZETIMIBE 10 MG/1
1 TABLET ORAL
Refills: 0 | DISCHARGE

## 2024-07-22 ENCOUNTER — TRANSCRIPTION ENCOUNTER (OUTPATIENT)
Age: 89
End: 2024-07-22

## 2024-07-22 RX ORDER — FUROSEMIDE 10 MG/ML
1 INJECTION, SOLUTION INTRAMUSCULAR; INTRAVENOUS
Refills: 0 | DISCHARGE

## 2024-07-22 RX ORDER — SACUBITRIL AND VALSARTAN 97; 103 MG/1; MG/1
1 TABLET, FILM COATED ORAL
Refills: 0 | DISCHARGE

## 2024-08-17 ENCOUNTER — INPATIENT (INPATIENT)
Facility: HOSPITAL | Age: 89
LOS: 3 days | Discharge: HOME HEALTH | DRG: 914 | End: 2024-08-21
Attending: INTERNAL MEDICINE | Admitting: INTERNAL MEDICINE
Payer: MEDICARE

## 2024-08-17 VITALS
OXYGEN SATURATION: 96 % | HEART RATE: 69 BPM | SYSTOLIC BLOOD PRESSURE: 113 MMHG | RESPIRATION RATE: 15 BRPM | DIASTOLIC BLOOD PRESSURE: 79 MMHG | TEMPERATURE: 98 F | WEIGHT: 89.95 LBS | HEIGHT: 57 IN

## 2024-08-17 LAB
ALBUMIN SERPL ELPH-MCNC: 3.5 G/DL — SIGNIFICANT CHANGE UP (ref 3.3–5)
ALP SERPL-CCNC: 84 U/L — SIGNIFICANT CHANGE UP (ref 40–120)
ALT FLD-CCNC: 10 U/L — SIGNIFICANT CHANGE UP (ref 10–45)
ANION GAP SERPL CALC-SCNC: 10 MMOL/L — SIGNIFICANT CHANGE UP (ref 5–17)
APPEARANCE UR: CLEAR — SIGNIFICANT CHANGE UP
APTT BLD: 35 SEC — SIGNIFICANT CHANGE UP (ref 24.5–35.6)
AST SERPL-CCNC: 18 U/L — SIGNIFICANT CHANGE UP (ref 10–40)
BACTERIA # UR AUTO: NEGATIVE /HPF — SIGNIFICANT CHANGE UP
BASOPHILS # BLD AUTO: 0.05 K/UL — SIGNIFICANT CHANGE UP (ref 0–0.2)
BASOPHILS NFR BLD AUTO: 0.7 % — SIGNIFICANT CHANGE UP (ref 0–2)
BILIRUB SERPL-MCNC: 0.3 MG/DL — SIGNIFICANT CHANGE UP (ref 0.2–1.2)
BILIRUB UR-MCNC: NEGATIVE — SIGNIFICANT CHANGE UP
BUN SERPL-MCNC: 19 MG/DL — SIGNIFICANT CHANGE UP (ref 7–23)
CALCIUM SERPL-MCNC: 9.3 MG/DL — SIGNIFICANT CHANGE UP (ref 8.4–10.5)
CAST: 2 /LPF — SIGNIFICANT CHANGE UP (ref 0–4)
CHLORIDE SERPL-SCNC: 110 MMOL/L — HIGH (ref 96–108)
CO2 SERPL-SCNC: 21 MMOL/L — LOW (ref 22–31)
COLOR SPEC: YELLOW — SIGNIFICANT CHANGE UP
CREAT SERPL-MCNC: 0.84 MG/DL — SIGNIFICANT CHANGE UP (ref 0.5–1.3)
DIFF PNL FLD: NEGATIVE — SIGNIFICANT CHANGE UP
EGFR: 67 ML/MIN/1.73M2 — SIGNIFICANT CHANGE UP
EOSINOPHIL # BLD AUTO: 0.26 K/UL — SIGNIFICANT CHANGE UP (ref 0–0.5)
EOSINOPHIL NFR BLD AUTO: 3.8 % — SIGNIFICANT CHANGE UP (ref 0–6)
GLUCOSE SERPL-MCNC: 117 MG/DL — HIGH (ref 70–99)
GLUCOSE UR QL: NEGATIVE MG/DL — SIGNIFICANT CHANGE UP
HCT VFR BLD CALC: 38.3 % — SIGNIFICANT CHANGE UP (ref 34.5–45)
HGB BLD-MCNC: 12.1 G/DL — SIGNIFICANT CHANGE UP (ref 11.5–15.5)
IMM GRANULOCYTES NFR BLD AUTO: 0.6 % — SIGNIFICANT CHANGE UP (ref 0–0.9)
INR BLD: 1.38 RATIO — HIGH (ref 0.85–1.18)
KETONES UR-MCNC: NEGATIVE MG/DL — SIGNIFICANT CHANGE UP
LEUKOCYTE ESTERASE UR-ACNC: NEGATIVE — SIGNIFICANT CHANGE UP
LYMPHOCYTES # BLD AUTO: 1.27 K/UL — SIGNIFICANT CHANGE UP (ref 1–3.3)
LYMPHOCYTES # BLD AUTO: 18.4 % — SIGNIFICANT CHANGE UP (ref 13–44)
MAGNESIUM SERPL-MCNC: 2.2 MG/DL — SIGNIFICANT CHANGE UP (ref 1.6–2.6)
MCHC RBC-ENTMCNC: 31.6 GM/DL — LOW (ref 32–36)
MCHC RBC-ENTMCNC: 32 PG — SIGNIFICANT CHANGE UP (ref 27–34)
MCV RBC AUTO: 101.3 FL — HIGH (ref 80–100)
MONOCYTES # BLD AUTO: 0.54 K/UL — SIGNIFICANT CHANGE UP (ref 0–0.9)
MONOCYTES NFR BLD AUTO: 7.8 % — SIGNIFICANT CHANGE UP (ref 2–14)
NEUTROPHILS # BLD AUTO: 4.76 K/UL — SIGNIFICANT CHANGE UP (ref 1.8–7.4)
NEUTROPHILS NFR BLD AUTO: 68.7 % — SIGNIFICANT CHANGE UP (ref 43–77)
NITRITE UR-MCNC: NEGATIVE — SIGNIFICANT CHANGE UP
NRBC # BLD: 0 /100 WBCS — SIGNIFICANT CHANGE UP (ref 0–0)
NT-PROBNP SERPL-SCNC: 2644 PG/ML — HIGH (ref 0–300)
PH UR: 5.5 — SIGNIFICANT CHANGE UP (ref 5–8)
PLATELET # BLD AUTO: 163 K/UL — SIGNIFICANT CHANGE UP (ref 150–400)
POTASSIUM SERPL-MCNC: 4.3 MMOL/L — SIGNIFICANT CHANGE UP (ref 3.5–5.3)
POTASSIUM SERPL-SCNC: 4.3 MMOL/L — SIGNIFICANT CHANGE UP (ref 3.5–5.3)
PROT SERPL-MCNC: 6.2 G/DL — SIGNIFICANT CHANGE UP (ref 6–8.3)
PROT UR-MCNC: NEGATIVE MG/DL — SIGNIFICANT CHANGE UP
PROTHROM AB SERPL-ACNC: 15 SEC — HIGH (ref 9.5–13)
RBC # BLD: 3.78 M/UL — LOW (ref 3.8–5.2)
RBC # FLD: 13.5 % — SIGNIFICANT CHANGE UP (ref 10.3–14.5)
RBC CASTS # UR COMP ASSIST: 1 /HPF — SIGNIFICANT CHANGE UP (ref 0–4)
REVIEW: SIGNIFICANT CHANGE UP
SODIUM SERPL-SCNC: 141 MMOL/L — SIGNIFICANT CHANGE UP (ref 135–145)
SP GR SPEC: 1.02 — SIGNIFICANT CHANGE UP (ref 1–1.03)
SQUAMOUS # UR AUTO: 2 /HPF — SIGNIFICANT CHANGE UP (ref 0–5)
TROPONIN T, HIGH SENSITIVITY RESULT: 17 NG/L — SIGNIFICANT CHANGE UP (ref 0–51)
UROBILINOGEN FLD QL: 1 MG/DL — SIGNIFICANT CHANGE UP (ref 0.2–1)
WBC # BLD: 6.92 K/UL — SIGNIFICANT CHANGE UP (ref 3.8–10.5)
WBC # FLD AUTO: 6.92 K/UL — SIGNIFICANT CHANGE UP (ref 3.8–10.5)
WBC UR QL: 1 /HPF — SIGNIFICANT CHANGE UP (ref 0–5)

## 2024-08-17 RX ORDER — LORAZEPAM 4 MG/ML
1 INJECTION INTRAMUSCULAR; INTRAVENOUS ONCE
Refills: 0 | Status: DISCONTINUED | OUTPATIENT
Start: 2024-08-17 | End: 2024-08-17

## 2024-08-17 RX ORDER — LORAZEPAM 4 MG/ML
0.5 INJECTION INTRAMUSCULAR; INTRAVENOUS ONCE
Refills: 0 | Status: DISCONTINUED | OUTPATIENT
Start: 2024-08-17 | End: 2024-08-17

## 2024-08-17 RX ADMIN — LORAZEPAM 0.5 MILLIGRAM(S): 4 INJECTION INTRAMUSCULAR; INTRAVENOUS at 21:06

## 2024-08-17 RX ADMIN — LORAZEPAM 0.5 MILLIGRAM(S): 4 INJECTION INTRAMUSCULAR; INTRAVENOUS at 20:21

## 2024-08-17 RX ADMIN — LORAZEPAM 1 MILLIGRAM(S): 4 INJECTION INTRAMUSCULAR; INTRAVENOUS at 18:26

## 2024-08-17 NOTE — ED ADULT NURSE REASSESSMENT NOTE - NS ED NURSE REASSESS COMMENT FT1
pt agitated at ct, unable to complete ct; md sanchez aware, ordering ativan and pt will return to ct; 1:1 at bedside

## 2024-08-17 NOTE — ED ADULT TRIAGE NOTE - BP NONINVASIVE SYSTOLIC (MM HG)
Blood pressure 119/70, pulse 93, temperature 37.3 °C (99.1 °F), resp. rate 15, height 1.829 m (6'), weight 90 kg (198 lb 6.6 oz), SpO2 97 %.    Patient rounded on. Assessed.    Patient in bed awake and resting. Reporting pain 4/10. Alert and oriented. Oxygen saturation 99% on 3 L oxymask.   Doing well at this time.   Discussed with RN. Rapid team is following as well.   113

## 2024-08-17 NOTE — ED PROVIDER NOTE - ATTENDING CONTRIBUTION TO CARE
89 yo female BIBEMS for unwitnessed fall @ facility.  EMS @ bedside for collateral.    per patient, fall was mechanical.  no current complaint.  per records, patient on a/c.  CT head, r/o traumatic injury and reassess.

## 2024-08-17 NOTE — ED PROVIDER NOTE - NSFOLLOWUPINSTRUCTIONS_ED_ALL_ED_FT
You were in the ED after a mechanical fall. You sustained a hematoma to your forehead. You had imaging done of your head and neck that did not show any acute fracture or bleed.  You should return to the ED if you have changing mental status from baseline. vomiting, visual changes, headache.

## 2024-08-17 NOTE — ED PROVIDER NOTE - WR ORDER STATUS 1
R FOOT MIN 3 VIEWS ROUTINE



CLINICAL HISTORY: Lateral right foot pain.    



COMPARISON: None



FINDINGS:  Tarsometatarsal joints are intact. No acute fracture is identified.

Incidental note is made of a bipartite lateral sesamoid of the right great toe.

Soft tissue swelling along the lateral aspect of the right fifth

metatarsophalangeal joint is noted. No acute fractures identified. There is mild

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



IMPRESSION: 



1. No acute fracture or dislocation within the right foot.



2. Soft tissue swelling along the lateral aspect of the right fifth

metatarsophalangeal joint.







Electronically signed by:  Ronnie Middleton M.D.

5/25/2018 5:39 PM



Dictated Date/Time:  5/25/2018 5:36 PM Resulted

## 2024-08-17 NOTE — ED PROVIDER NOTE - CLINICAL SUMMARY MEDICAL DECISION MAKING FREE TEXT BOX
89-year-old female, history of CAD, A-fib, hyperlipidemia, dementia, hypothyroidism, presents to ED from assisted living facility for unwitnessed fall at facility with head trauma.  Patient reports she was walking, tripped over her feet and fell forward striking her forehead.  Did not pass out.  Per documents patient on Plavix and Xarelto.  Patient denies chest pain, shortness of breath, abdominal pain, nausea, vomiting, recent fevers\chills, cough, congestion.  Vital signs stable.  Patient elderly appearing, no acute distress, 2 cm area of ecchymosis and hematoma noted to forehead, heart regular rhythm rhythm, lungs clear, abdomen soft and nontender, pelvis stable, no gross motor or sensory deficits, patient moving all 4 extremities spontaneously without difficulty, no midline spinal tenderness, no anterior\posterior chest wall tenderness, no other external signs of trauma.  Will assess for electrolyte\metabolic abnormality, infectious process, acute intracranial pathology, arrhythmia.  Plan for basic labs, troponin, ECG, chest x-ray, pelvic x-ray, CT head neck without contrast, UA with culture. 89-year-old female, history of CAD, A-fib, hyperlipidemia, dementia, hypothyroidism, presents to ED from assisted living facility for unwitnessed fall at facility with head trauma.  Patient reports she was walking, tripped over her feet and fell forward striking her forehead.  Did not pass out.  Per documents patient on Plavix and Xarelto.  Patient denies chest pain, shortness of breath, abdominal pain, nausea, vomiting, recent fevers\chills, cough, congestion.  Vital signs stable.  Patient elderly appearing, no acute distress, 2 cm area of ecchymosis and hematoma noted to forehead, heart regular rhythm rhythm, lungs clear, abdomen soft and nontender, pelvis stable, no gross motor or sensory deficits, patient moving all 4 extremities spontaneously without difficulty, no midline spinal tenderness, no anterior\posterior chest wall tenderness, no other external signs of trauma.  Will assess for electrolyte\metabolic abnormality, infectious process, acute intracranial pathology, arrhythmia.  Plan for basic labs, troponin, ECG, chest x-ray, pelvic x-ray, CT head neck without contrast, UA with culture.    see attending attestation authored by me, Glenn Pepe MD, for further MDM details.

## 2024-08-17 NOTE — ED ADULT NURSE NOTE - OBJECTIVE STATEMENT
Pt is 89y/o pmhx of CAD, A-fib (per paperwork pt on Plavix and Xeralto) presents to ED from nursing home for unwitnessed fall, + head strike.   Patient states she was walking, tripped over her feet and fell forward striking her forehead. Denies LOC. Pt has no complaints at this time. Pt presents alert & oriented x 1-2 (knows her name and why she is here, unsure of date), calm, able to follow commands, speech clear. hematoma noted for forehead. . Breathing spontaneous & nonlabored. Abdomen soft & nondistended.  Strong peripheral pulses noted b/l, no edema noted. Skin warm, clean, dry & intact. Denies chest pain, SOB, abdominal pain, back pain, n/v/d, fever, chills, changes in vision. Call bell within reach and pt instructed on how to use, bed in lowest position, side rails up, wheels locked.

## 2024-08-17 NOTE — ED ADULT NURSE REASSESSMENT NOTE - NS ED NURSE REASSESS COMMENT FT1
pt agitated at ct; ct unable to be completed; md sanchez aware; pt given .5mg of ativan; pt to ct; pt calm, 1:1 taking pt to ct, ct made aware

## 2024-08-17 NOTE — ED ADULT NURSE NOTE - NSFALLRISKINTERV_ED_ALL_ED

## 2024-08-17 NOTE — ED ADULT TRIAGE NOTE - HEIGHT IN CM
Results for orders placed or performed in visit on 02/26/20   Streptococcus A Rapid Scr w Reflx to PCR     Status: Abnormal   Result Value Ref Range    Strep Specimen Description Throat     Streptococcus Group A Rapid Screen Positive (A) NEG^Negative       Antibiotics per Epic order.  Symptomatic treat with gargles, lozenges, and OTC analgesic as needed.  Is contagious until on antibiotics for 24 hours, limit contacts and no school until tomorrow afternoon.  Discard toothbrush after 24 hours on antibiotics and then at the end of therapy.  Do not share food or drinks for the next 24 hours.  Follow-up with primary care provider if not improving.     144.78

## 2024-08-17 NOTE — ED PROVIDER NOTE - PROGRESS NOTE DETAILS
Adelfo PGY2: patient became very agitated in ED, screaming and shouting.  will give 1mg ativan. CTH and  neck with chronic degenerative changes but no acute fracture or hemorrhage. Adelfo PGY2: Patient was awaiting transport home overnight back to assisted living facility.  received sedation last night for agitation.  this morning upon transport arrival, patient very somnolent.  VSS.  appears dehydrated.  Will give small fluid bolus, reassess. RGUJRAL Patient reassessed after sign out and sleeping, Pt was medicated for agitation during her ED LOS. Pt now more arousable but somnolent, EMS here for , spoke to patient's daughter, normally patient talks and ambulates with a walker at the assisted living. Will keep her in ED at this time, give her some IV fluids and re eval. Adelfo PGY2: on re-eval patient still somnolent but arrousable.  Do not feel patient can be discharged safely given current mental status.  will admit. Pt reassessed still somnolent but improved, will admit at this time for monitoring, PT eval for possible EWA. RGUJRAL Pt reassessed still somnolent but improved, will admit at this time for monitoring, PT eval for possible EWA. Family updated. RGUJRAL

## 2024-08-17 NOTE — ED ADULT NURSE NOTE - COVID-19  TEST TYPE
Progress Note - Kristel Chung 80 y o  female MRN: [id number]    Unit/Bed#: [facility] 824-01 Encounter: [id number]      Assessment:  80 F with laparoscopic VHR complicated by small bowel injury, now s/p exploratory laparotomy, mesh explantation, SBR, and eventual abdominal closure, also with candidal fungemia      Pak placed again yesterday for urinary retention  VSS  Afebrile  WBC 17->14 9  Hgb 7 2-> 7  Incision clean, dry and intact  Abd  Soft/ nontender/ nondistended  Plan:  Maintain pak  Continue dysphagia diet  Continue abx  Subjective:   Denied fever, chills, chest pain, shortness of breath, nausea, vomiting, or abdominal pain this morning  Objective:     Vitals: Blood pressure 129/74, pulse (!) 108, temperature 98 2 °F (36 8 °C), resp  rate 16, height 5' 4" (1 626 m), weight 72 5 kg (159 lb 14 4 oz), SpO2 95 %  ,Body mass index is 27 45 kg/m²  Intake/Output Summary (Last 24 hours) at 8/23/2019 0548  Last data filed at 8/23/2019 0533  Gross per 24 hour   Intake 5446 67 ml   Output 7950 ml   Net -2503 33 ml       Physical Exam  General: NAD  HEENT: NC/AT  MMM  Cv: RRR  Lungs: normal effort  Ab: Soft, NT/ND  Ex: no CCE  Neuro: AAOx3      Invasive Devices     Peripherally Inserted Central Catheter Line            PICC Line 92/49/79 Right Basilic 8 days          Drain            Urethral Catheter Latex 16 Fr  less than 1 day                Lab, Imaging and other studies: I have personally reviewed pertinent reports      VTE Pharmacologic Prophylaxis: Heparin  VTE Mechanical Prophylaxis: sequential compression device MOLECULAR PCR
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
## 2024-08-18 DIAGNOSIS — I48.0 PAROXYSMAL ATRIAL FIBRILLATION: ICD-10-CM

## 2024-08-18 DIAGNOSIS — I50.22 CHRONIC SYSTOLIC (CONGESTIVE) HEART FAILURE: ICD-10-CM

## 2024-08-18 DIAGNOSIS — E03.9 HYPOTHYROIDISM, UNSPECIFIED: ICD-10-CM

## 2024-08-18 DIAGNOSIS — Z95.2 PRESENCE OF PROSTHETIC HEART VALVE: ICD-10-CM

## 2024-08-18 DIAGNOSIS — S09.90XA UNSPECIFIED INJURY OF HEAD, INITIAL ENCOUNTER: ICD-10-CM

## 2024-08-18 DIAGNOSIS — F03.90 UNSPECIFIED DEMENTIA, UNSPECIFIED SEVERITY, WITHOUT BEHAVIORAL DISTURBANCE, PSYCHOTIC DISTURBANCE, MOOD DISTURBANCE, AND ANXIETY: ICD-10-CM

## 2024-08-18 DIAGNOSIS — Z29.9 ENCOUNTER FOR PROPHYLACTIC MEASURES, UNSPECIFIED: ICD-10-CM

## 2024-08-18 RX ORDER — LEVOTHYROXINE SODIUM 100 MCG
88 TABLET ORAL DAILY
Refills: 0 | Status: DISCONTINUED | OUTPATIENT
Start: 2024-08-18 | End: 2024-08-21

## 2024-08-18 RX ORDER — METOPROLOL TARTRATE 100 MG/1
50 TABLET ORAL DAILY
Refills: 0 | Status: DISCONTINUED | OUTPATIENT
Start: 2024-08-18 | End: 2024-08-21

## 2024-08-18 RX ORDER — ASCORBIC ACID/ASCORBATE SODIUM 500 MG
500 TABLET,CHEWABLE ORAL DAILY
Refills: 0 | Status: DISCONTINUED | OUTPATIENT
Start: 2024-08-18 | End: 2024-08-21

## 2024-08-18 RX ORDER — SODIUM CHLORIDE 9 MG/ML
500 INJECTION INTRAMUSCULAR; INTRAVENOUS; SUBCUTANEOUS ONCE
Refills: 0 | Status: COMPLETED | OUTPATIENT
Start: 2024-08-18 | End: 2024-08-18

## 2024-08-18 RX ORDER — TRAZODONE HCL 50 MG
150 TABLET ORAL DAILY
Refills: 0 | Status: DISCONTINUED | OUTPATIENT
Start: 2024-08-18 | End: 2024-08-21

## 2024-08-18 RX ORDER — SERTRALINE HYDROCHLORIDE 50 MG/1
150 TABLET, FILM COATED ORAL DAILY
Refills: 0 | Status: DISCONTINUED | OUTPATIENT
Start: 2024-08-18 | End: 2024-08-21

## 2024-08-18 RX ORDER — ALPRAZOLAM 0.25 MG
0.25 TABLET ORAL DAILY
Refills: 0 | Status: DISCONTINUED | OUTPATIENT
Start: 2024-08-18 | End: 2024-08-21

## 2024-08-18 RX ADMIN — SODIUM CHLORIDE 500 MILLILITER(S): 9 INJECTION INTRAMUSCULAR; INTRAVENOUS; SUBCUTANEOUS at 09:53

## 2024-08-18 NOTE — ED ADULT NURSE REASSESSMENT NOTE - NS ED NURSE REASSESS COMMENT FT1
Patient is more awake and alert. Repositioned in stretcher. Breathing spontaneous and unlabored on room air. Pending transport back to the [facility].

## 2024-08-18 NOTE — H&P ADULT - PROBLEM SELECTOR PLAN 6
holding home plavix given history of tavr (which was documented to be have been done >2 years ago anyway)

## 2024-08-18 NOTE — H&P ADULT - HISTORY OF PRESENT ILLNESS
89F w/ CHF, AS s/p TAVR, dementia (AOx1), pAF on Xarelto, hypothyroidism, recent admission for COVID-19, with [facility] (which improved after discontinuation of anti hypertensives) who presents now with unwitnessed fall at her nursing home with head trauma. Patient is unable to provide history, history obtained from daughter Liza over the phone. She was at the [facility], she was doing okay until yesterday, when she was walking to the hallaway and then she fell down and hit her head and her knee. She did not pass out at that time. Normally walks with a walker, does not require anyone assisting her. Has been having multiple falls, once a year for the past 7 years. NO fevers, chills, no burning with urination, no changes in urination. Other than this instance, [facility].

## 2024-08-18 NOTE — H&P ADULT - PROBLEM SELECTOR PLAN 7
diet - easy to chew (previous diet in july)  dvt ppx - scds  dispo - pending tte, pt/ot  *will need to clarify home meds with cristopher in the AM, as no one was reached. family says medications were not changed since last hospitalization  - called daughter eldon today 8/18 [phone]

## 2024-08-18 NOTE — H&P ADULT - ASSESSMENT
89F w/ CHF, AS s/p TAVR, dementia (AOx1), pAF on Xarelto, hypothyroidism, recent admission for COVID-19, with [facility] (which improved after discontinuation of anti hypertensives) who presents now with unwitnessed fall at her nursing home with head trauma, admitted due to worsening mental status and PT eval.

## 2024-08-18 NOTE — H&P ADULT - PROBLEM SELECTOR PLAN 3
AOX1 at baseline, normally can walk with walker  - c/w home  seroquel, xanax, sertraline, trazodone  - from [place] [facility]

## 2024-08-18 NOTE — ED ADULT NURSE REASSESSMENT NOTE - NS ED NURSE REASSESS COMMENT FT1
Patient is sleeping, resting comfortably. Aide at bedside. Pending transport back to the [facility].

## 2024-08-18 NOTE — ED ADULT NURSE REASSESSMENT NOTE - NS ED NURSE REASSESS COMMENT FT1
Patient is awake and alert, A+Ox1 and speaking in sentences. Patient attempted to ambulate by RN and ED tech per MD verbal order, patient able to stand with assistance but unable to walk. MD aware. Patient cleaned turned and repositioned in stretcher.

## 2024-08-18 NOTE — H&P ADULT - PROBLEM SELECTOR PLAN 4
Documented HF in charts and was previously on lasix and entresto which were both stopped due to hypotension  - obtain baseline TTE  - seems euvolemic on exam  - continue to hold lasix and entresto  - continue home metoprolol

## 2024-08-18 NOTE — H&P ADULT - NSHPLABSRESULTS_GEN_ALL_CORE
CT scan:  < from: CT Cervical Spine No Cont (08.17.24 @ 21:29) >    IMPRESSION:    CT HEAD:  Small frontal scalp hematoma. No calvarial fracture or acute intracranial   pathology.    CT CERVICAL SPINE:  No acute fracture or traumatic subluxation    Multi-level degenerative changes.    < end of copied text >        < from: Xray Chest 1 View AP/PA (08.17.24 @ 17:42) >    IMPRESSION:  No focal consolidation.    < end of copied text >    < from: Xray Pelvis AP only (08.17.24 @ 17:41) >      IMPRESSION: Chronic appearing fractures of the superior inferior pubic   rami. No acute fracture. If there is concern for an occult fracture, CT   is recommended.    < end of copied text >        Labs below are personally reviewed:

## 2024-08-18 NOTE — H&P ADULT - PROBLEM SELECTOR PLAN 2
[id number] at least 5 points ->Stroke 7.2%  - spoke to daughter eldon and explained high risk of stroke vs high risk of bleeding given her frequent falls, fractures and current scalp hematoma. we decided to hold xarelto for now, and eldon will speak to her other sister and discuss if they want their mother to restart xarelto in the near future  - c/w home metoprolol   - will consult Dr. Lopez to assist in management given complicated cardiac history and TAVR history   - obtain TTE ( previously documented HF, but no TTE in chart)

## 2024-08-18 NOTE — H&P ADULT - NSHPPHYSICALEXAM_GEN_ALL_CORE
GENERAL: in distress, upset, refused most of physical exam   HEAD:  bruising in her eye, bruise in forehead   NECK: Supple  CHEST/LUNG: no respiratory distress noted   HEART: Regular rate and rhythm; No murmurs, rubs, or gallops  PSYCH: AAOx1, agitated

## 2024-08-18 NOTE — ED ADULT NURSE REASSESSMENT NOTE - NS ED NURSE REASSESS COMMENT FT1
Patient turned and repositioned in stretcher. Aide remains at bedside. Breathing spontaneous and unlabored on room air. Skin warm pink and dry. Patient admitted to medicine, awaiting bed.

## 2024-08-19 ENCOUNTER — TRANSCRIPTION ENCOUNTER (OUTPATIENT)
Age: 89
End: 2024-08-19

## 2024-08-19 LAB
ANION GAP SERPL CALC-SCNC: 11 MMOL/L — SIGNIFICANT CHANGE UP (ref 5–17)
BUN SERPL-MCNC: 14 MG/DL — SIGNIFICANT CHANGE UP (ref 7–23)
CALCIUM SERPL-MCNC: 9.1 MG/DL — SIGNIFICANT CHANGE UP (ref 8.4–10.5)
CHLORIDE SERPL-SCNC: 108 MMOL/L — SIGNIFICANT CHANGE UP (ref 96–108)
CO2 SERPL-SCNC: 21 MMOL/L — LOW (ref 22–31)
CREAT SERPL-MCNC: 0.87 MG/DL — SIGNIFICANT CHANGE UP (ref 0.5–1.3)
CULTURE RESULTS: SIGNIFICANT CHANGE UP
EGFR: 64 ML/MIN/1.73M2 — SIGNIFICANT CHANGE UP
GLUCOSE SERPL-MCNC: 86 MG/DL — SIGNIFICANT CHANGE UP (ref 70–99)
HCT VFR BLD CALC: 32.2 % — LOW (ref 34.5–45)
HGB BLD-MCNC: 10.7 G/DL — LOW (ref 11.5–15.5)
MAGNESIUM SERPL-MCNC: 2 MG/DL — SIGNIFICANT CHANGE UP (ref 1.6–2.6)
MCHC RBC-ENTMCNC: 32.9 PG — SIGNIFICANT CHANGE UP (ref 27–34)
MCHC RBC-ENTMCNC: 33.2 GM/DL — SIGNIFICANT CHANGE UP (ref 32–36)
MCV RBC AUTO: 99.1 FL — SIGNIFICANT CHANGE UP (ref 80–100)
NRBC # BLD: 0 /100 WBCS — SIGNIFICANT CHANGE UP (ref 0–0)
PHOSPHATE SERPL-MCNC: 2.5 MG/DL — SIGNIFICANT CHANGE UP (ref 2.5–4.5)
PLATELET # BLD AUTO: 147 K/UL — LOW (ref 150–400)
POTASSIUM SERPL-MCNC: 3.9 MMOL/L — SIGNIFICANT CHANGE UP (ref 3.5–5.3)
POTASSIUM SERPL-SCNC: 3.9 MMOL/L — SIGNIFICANT CHANGE UP (ref 3.5–5.3)
RBC # BLD: 3.25 M/UL — LOW (ref 3.8–5.2)
RBC # FLD: 13.4 % — SIGNIFICANT CHANGE UP (ref 10.3–14.5)
SODIUM SERPL-SCNC: 140 MMOL/L — SIGNIFICANT CHANGE UP (ref 135–145)
SPECIMEN SOURCE: SIGNIFICANT CHANGE UP
WBC # BLD: 7.13 K/UL — SIGNIFICANT CHANGE UP (ref 3.8–10.5)
WBC # FLD AUTO: 7.13 K/UL — SIGNIFICANT CHANGE UP (ref 3.8–10.5)

## 2024-08-19 RX ORDER — LORAZEPAM 4 MG/ML
0.5 INJECTION INTRAMUSCULAR; INTRAVENOUS ONCE
Refills: 0 | Status: DISCONTINUED | OUTPATIENT
Start: 2024-08-19 | End: 2024-08-19

## 2024-08-19 RX ORDER — FOLIC ACID/MULTIVIT,IRON,MINER 0.4MG-18MG
2000 TABLET,CHEWABLE ORAL DAILY
Refills: 0 | Status: DISCONTINUED | OUTPATIENT
Start: 2024-08-19 | End: 2024-08-21

## 2024-08-19 RX ADMIN — Medication 5 MILLIGRAM(S): at 22:24

## 2024-08-19 RX ADMIN — Medication 0.25 MILLIGRAM(S): at 09:42

## 2024-08-19 RX ADMIN — Medication 40 MILLIGRAM(S): at 22:24

## 2024-08-19 RX ADMIN — METOPROLOL TARTRATE 50 MILLIGRAM(S): 100 TABLET ORAL at 06:17

## 2024-08-19 RX ADMIN — LORAZEPAM 0.5 MILLIGRAM(S): 4 INJECTION INTRAMUSCULAR; INTRAVENOUS at 01:24

## 2024-08-19 RX ADMIN — Medication 88 MICROGRAM(S): at 06:16

## 2024-08-19 RX ADMIN — SERTRALINE HYDROCHLORIDE 150 MILLIGRAM(S): 50 TABLET, FILM COATED ORAL at 09:42

## 2024-08-19 RX ADMIN — Medication 2000 UNIT(S): at 09:43

## 2024-08-19 RX ADMIN — Medication 500 MILLIGRAM(S): at 09:43

## 2024-08-19 RX ADMIN — Medication 150 MILLIGRAM(S): at 22:24

## 2024-08-19 RX ADMIN — Medication 12.5 MILLIGRAM(S): at 09:42

## 2024-08-19 NOTE — PHYSICAL THERAPY INITIAL EVALUATION ADULT - PERTINENT HX OF CURRENT PROBLEM, REHAB EVAL
Pt is an 89F adm to [facility] ED on 8/18/24 w/ CHF, AS s/p TAVR, dementia (AOx1), pAF on Xarelto, hypothyroidism, recent admission for COVID-19, with another recent admission at [facility] for lethargy and hypotension (which improved after discontinuation of anti hypertensives) who presents now with unwitnessed fall at her nursing home with head trauma. Patient is unable to provide history, history obtained from daughter Liza over the phone. She was at the [facility], she was doing okay until yesterday, when she was walking to the hallaway and then she fell down and hit her head and her knee. She did not pass out at that time. Normally walks with a walker, does not require anyone assisting her. Has been having multiple falls, once a year for the past 7 years. NO fevers, chills, no burning with urination, no changes in urination. Other than this instance[facility] HEAD: Small frontal scalp hematoma. No calvarial fracture or acute intracranial pathology. CT CERVICAL SPINE: No acute fracture or traumatic subluxation Multi-level degenerative changes. CXR: No focal consolidation. Pelvic XRAY: Chronic appearing fractures of the superior inferior pubic rami. No acute fracture. If there is concern for an occult fracture, CT is recommended.

## 2024-08-19 NOTE — DISCHARGE NOTE PROVIDER - HOSPITAL COURSE
Patient is a 89F w/ CHF, AS s/p TAVR, dementia (AOx1), pAF on Xarelto, hypothyroidism, recent admission for COVID-19, with another recent [facility] at [facility] hypot[facility] (which improved after discontinuation of anti hypertensives) who presents now with unwitnessed fall at her nursing home with head trauma. Patient is unable to provide history, history obtained from daughter Liza over the phone. She was at the [facility], she was doing okay until yesterday, when she was walking to the hallway and then she fell down and hit her head and her knee. She did not pass out at that time. Normally walks with a walker, does not require anyone assisting her. Has been having multiple falls, once a year for the past 7 years. NO fevers, chills, no burning with urination, no changes in urination. [facility]      H[facility] Course: Patient present presented from the [facility] status post fall with head strike. CT H/C-Spine -> CT HEAD: Small frontal scalp hematoma. No calvarial fracture or acute intracranial pathology. CT CERVICAL SPINE: No acute fracture or traumatic subluxation Multi-level degenerative changes. Xray Pelvis-> IMPRESSION: Chronic appearing fractures of the superior inferior pubic rami. No acute fracture. PT/OT Evaluation-> However, family requesting only home PT at this time, declines EWA. OT Recs- Sub-acute Rehab; if return to [facility] pt will require 24hr assist and supervision with all ADLs and mobility +polyfly WC for longer distances. Patient with history of Afib on Xarelto. Cards consult called-> AC on hold for now may resume in the future, pending Echo..........      Important Medication Changes and Reason:    Active or Pending Issues Requiring Follow-up:    Advanced Directives:   [ x] Full code  [ ] DNR  [ ] Hospice    Discharge Diagnoses:  Recurrent falls   Aortic stenosis   Chronic combined systolic and diastolic heart failure   Chronic mood disorder   Cognitive decline   Hyperlipidemia   Hypertension   Hypothyroidism   Paroxysmal atrial fibrillation   Recurrent UTI.        Patient is a 89F w/ CHF, AS s/p TAVR, dementia (AOx1), pAF on Xarelto, hypothyroidism, recent admission for COVID-19, with another recent admission at [facility] and hypotension (which improved after discontinuation of anti hypertensives) who presents now with unwitnessed fall at her nursing home with head trauma. Patient is unable to provide history, history obtained from daughter Liza over the phone. She was at the [facility], she was doing okay until yesterday, when she was walking to the hallway and then she fell down and hit her head and her knee. She did not pass out at that time. Normally walks with a walker, does not require anyone assisting her. Has been having multiple falls, once a year for the past 7 years. NO fevers, chills, no burning with urination, no changes in urination. [facility] than this instance, [facility].      Hospital Course: Patient present presented from the [place] status post fall with head strike. CT H/C-Spine -> CT HEAD: Small frontal scalp hematoma. No calvarial fracture or acute intracranial pathology. CT CERVICAL SPINE: No acute fracture or traumatic subluxation Multi-level degenerative changes. Xray Pelvis-> IMPRESSION: Chronic appearing fractures of the superior inferior pubic rami. No acute fracture. PT/OT Evaluation-> However, family requesting only home PT at this time, declines EWA. OT Recs- Sub-acute Rehab; if return to [place] pt will require 24hr assist and supervision with all ADLs and mobility +polyfly WC for longer distances. Patient with history of Afib on Xarelto. Cards consult called-> AC on hold for now may resume in the future,echo noted fu outpatient with cards.       Important Medication Changes and Reason:    Active or Pending Issues Requiring Follow-up:    Advanced Directives:   [ x] Full code  [ ] DNR  [ ] Hospice    Discharge Diagnoses:  Recurrent falls   Aortic stenosis   Chronic combined systolic and diastolic heart failure   Chronic mood disorder   Cognitive decline   Hyperlipidemia   Hypertension   Hypothyroidism   Paroxysmal atrial fibrillation   Recurrent UTI.

## 2024-08-19 NOTE — PROGRESS NOTE ADULT - PROBLEM SELECTOR PLAN 2
[id number] at least 5 points ->Stroke 7.2%  - daughter wants to hold off on AC for now given high risk of falls and bleed-- holding xarelto for now, may resume in the future   - c/w home metoprolol   - TTE pending  - cards consulted [id number] at least 5 points ->Stroke 7.2%  - daughter wants to hold off on AC for now given high risk of falls and bleed-- holding xarelto for now, may resume in the future   - c/w home metoprolol   - TTE pending  - cards consulted- discussed with dr pizarro

## 2024-08-19 NOTE — OCCUPATIONAL THERAPY INITIAL EVALUATION ADULT - PERSONAL SAFETY AND JUDGMENT, REHAB EVAL
pt impulsive, attempting to walk without socks or assist, attempts to sit on toilet while facing it./at risk behaviors demonstrated

## 2024-08-19 NOTE — DISCHARGE NOTE PROVIDER - NSDCMRMEDTOKEN_GEN_ALL_CORE_FT
ascorbic acid 500 mg oral tablet: 1 tab(s) orally once a day  atorvastatin 40 mg oral tablet: 1 tab(s) orally once a day  cholecalciferol 50 mcg (2000 intl units) oral tablet: 1 tab(s) orally once a day  clopidogrel 75 mg oral tablet: 1 tab(s) orally once a day  ezetimibe 10 mg oral tablet: 1 tab(s) orally once a day  ferrous sulfate (as elemental iron) 45 mg oral tablet, extended release: 1 tab(s) orally once a day  levothyroxine 88 mcg (0.088 mg) oral tablet: 1 tab(s) orally once a day  melatonin 10 mg oral tablet: 1 tab(s) orally once a day  methenamine hippurate 1 g oral tablet: 1 tab(s) orally once a day  metoprolol succinate 50 mg oral tablet, extended release: 1 tab(s) orally once a day (in the evening) with dinner  metoprolol succinate 50 mg oral tablet, extended release: 2 tab(s) orally once a day (in the morning)  Probiotic Formula (Bacillus Coagulans) oral capsule: 1 cap(s) orally once a day  QUEtiapine 25 mg oral tablet: 0.5 tab(s) orally once a day  sertraline 100 mg oral tablet: 1.5 tab(s) orally once a day  traZODone 150 mg oral tablet: 1 tab(s) orally once a day  Xanax 0.25 mg oral tablet: 1 tab(s) orally once a day   ascorbic acid 500 mg oral tablet: 1 tab(s) orally once a day  atorvastatin 40 mg oral tablet: 1 tab(s) orally once a day  cholecalciferol 50 mcg (2000 intl units) oral tablet: 1 tab(s) orally once a day  ezetimibe 10 mg oral tablet: 1 tab(s) orally once a day  ferrous sulfate (as elemental iron) 45 mg oral tablet, extended release: 1 tab(s) orally once a day  levothyroxine 88 mcg (0.088 mg) oral tablet: 1 tab(s) orally once a day  melatonin 10 mg oral tablet: 1 tab(s) orally once a day  methenamine hippurate 1 g oral tablet: 1 tab(s) orally once a day  metoprolol succinate 50 mg oral tablet, extended release: 1 tab(s) orally once a day (in the evening) with dinner  Probiotic Formula (Bacillus Coagulans) oral capsule: 1 cap(s) orally once a day  QUEtiapine 25 mg oral tablet: 0.5 tab(s) orally once a day  sertraline 100 mg oral tablet: 1.5 tab(s) orally once a day  traZODone 150 mg oral tablet: 1 tab(s) orally once a day  Xanax 0.25 mg oral tablet: 1 tab(s) orally once a day

## 2024-08-19 NOTE — DISCHARGE NOTE PROVIDER - CARE PROVIDERS DIRECT ADDRESSES
,DirectAddress_Unknown ,DirectAddress_Unknown,[email or web address],DirectAddress_Unknown

## 2024-08-19 NOTE — PROGRESS NOTE ADULT - PROBLEM SELECTOR PLAN 7
diet - easy to chew (previous diet in july)  dvt ppx - scds  dispo - pending tte, pt/ot  *will need to clarify home meds with cristopher in the AM, as no one was reached. family says medications were not changed since last hospitalization  - called daughter eldon today 8/18 [phone] dvt ppx: scds

## 2024-08-19 NOTE — OCCUPATIONAL THERAPY INITIAL EVALUATION ADULT - PERTINENT HX OF CURRENT PROBLEM, REHAB EVAL
89F w/ CHF, AS s/p TAVR, dementia (AOx1), pAF on Xarelto, hypothyroidism, recent admission for COVID-19, with [facility] (which improved after discontinuation of anti hypertensives) who presents now with unwitnessed fall at her nursing home with head trauma. Patient is unable to provide history, history obtained from daughter Liza over the phone. She was at the [facility], she was doing okay until yesterday, when she was walking to the [place] and then she fell down and hit her head and her knee. She did not pass out at that time. Normally walks with a walker, does not require anyone assisting her. Has been having multiple falls, once a year for the past 7 years. NO fevers, chills, no burning with urination, no changes in urination. Other than this instance, [facility].  CT HEAD: Small frontal scalp hematoma. No calvarial fracture or acute intracranial   pathology. CT CERVICAL SPINE: No acute fracture or traumatic subluxation  xray pelvis: Chronic appearing fractures of the superior inferior pubic   rami. No acute fracture. If there is concern for an occult fracture, CT   is recommended.

## 2024-08-19 NOTE — DISCHARGE NOTE PROVIDER - PROVIDER TOKENS
PROVIDER:[TOKEN:[[id number]],FOLLOWUP:[1 week]] PROVIDER:[TOKEN:[[id number]],FOLLOWUP:[1 week]],PROVIDER:[TOKEN:[[id number]],FOLLOWUP:[1 week]],FREE:[LAST:[alicia],FIRST:[jon],PHONE:[phone]],FAX:[(   )    -],FOLLOWUP:[1 week],ESTABLISHEDPATIENT:[T]]

## 2024-08-19 NOTE — OCCUPATIONAL THERAPY INITIAL EVALUATION ADULT - NSOTDISCHREC_GEN_A_CORE
if return to [facility] pt will require 24hr assist and supervision with all ADLs and mobility +polyfly WC for longer distances/Sub-acute Rehab

## 2024-08-19 NOTE — DISCHARGE NOTE PROVIDER - CARE PROVIDER_API CALL
MARCO A BERRY  [address] [place] [id number]  Phone: ([phone]  Fax: ()-  Follow Up Time: 1 week   JON BERRY [address] [address]  Phone: ()-  Fax: ()-  Follow Up Time: 1 week    Shorty Tidwell  Interventional Cardiology  [address] [place] [id number]  Phone: [phone]  Fax: [phone]  Follow Up Time: 1 week    jon berry  Phone: [phone]  Fax: (   )    -  Established Patient  Follow Up Time: 1 week

## 2024-08-19 NOTE — DISCHARGE NOTE PROVIDER - NSDCFUADDAPPT_GEN_ALL_CORE_FT
APPTS ARE READY TO BE MADE: [ ] YES    Best Family or Patient Contact (if needed):    Additional Information about above appointments (if needed):    1: PCP  2: Cardiology  3:     Other comments or requests:    APPTS ARE READY TO BE MADE: [x] YES    Best Family or Patient Contact (if needed):    Additional Information about above appointments (if needed):    1: PCP  2: Cardiology  3:     Other comments or requests:    APPTS ARE READY TO BE MADE: [x] YES    Best Family or Patient Contact (if needed):    Additional Information about above appointments (if needed):    1: PCP  2: Cardiology  3:     Other comments or requests:   Patient is being discharged to rehab/hospice. Caregiver will arrange follow up.

## 2024-08-19 NOTE — PHYSICAL THERAPY INITIAL EVALUATION ADULT - ADDITIONAL COMMENTS
Pt resides at the [facility], as per former HHA at bedside, was not receiving aide services and was independent with ADLs/mobility +RW. Pt with multiple falls as per chart review. Pt resides at the [facility], as per former HHA at bedside, was not receiving aide services and was independent with ADLs/mobility +RW. Pt with h/o falls at the [facility] per EMR

## 2024-08-19 NOTE — OCCUPATIONAL THERAPY INITIAL EVALUATION ADULT - ADDITIONAL COMMENTS
Pt resides at the [facility], as per former [facility]HA at bedside, was not receiving aide services and was independent with ADLs/mobility +RW. Pt with multiple falls as per chart review.

## 2024-08-19 NOTE — PROGRESS NOTE ADULT - PROBLEM SELECTOR PLAN 4
- euvolemic  - c/w metoprolol   - lasix and entresto discontinued in recent hospitalization for hypotension  - tte pending  - cards pending - euvolemic  - c/w metoprolol   - lasix and entresto discontinued in recent hospitalization for hypotension  - tte pending  - cards pending- discussed with dr pizarro

## 2024-08-19 NOTE — DISCHARGE NOTE PROVIDER - NSDCCPCAREPLAN_GEN_ALL_CORE_FT
PRINCIPAL DISCHARGE DIAGNOSIS  Diagnosis: Head trauma  Assessment and Plan of Treatment: You presented to the ED status post fall with head strike. CT with scalp hematoma, negative for active bleed. Xarelto currently on hold. Slow progressive position changes, assistance with all ADL's.      SECONDARY DISCHARGE DIAGNOSES  Diagnosis: Fall  Assessment and Plan of Treatment: You presented to the ED status post fall with head strike. CT with scalp hematoma, negative for active bleed. Xarelto currently on hold. Slow progressive position changes, assistance with all ADL's.    Diagnosis: Dementia  Assessment and Plan of Treatment: You have a  history of dementia. Continue all medications as prescribed. Follow-up with PCP within 1-2 weeks of discharge    Diagnosis: Paroxysmal atrial fibrillation  Assessment and Plan of Treatment: You have a history of Afib. Xarelto is currently on Hold secondary to recurrent falls. Please follow-up with your Cardiologist to discuss with when and if medication should be resumed.    Diagnosis: Hypothyroidism  Assessment and Plan of Treatment: You have a history of Hypothyroidism. Please contiue all medication as prescribed.

## 2024-08-19 NOTE — PHYSICAL THERAPY INITIAL EVALUATION ADULT - TRANSFER SAFETY CONCERNS NOTED: SIT/STAND, REHAB EVAL
decreased balance during turns/decreased safety awareness/losing balance/decreased step length/stepping too close to front of assistive device/decreased weight-shifting ability

## 2024-08-19 NOTE — CONSULT NOTE ADULT - SUBJECTIVE AND OBJECTIVE BOX
DATE OF SERVICE: 08-19-24 @ 18:52    CHIEF COMPLAINT:Patient is a 89y old  Female who presents with a chief complaint of Falls (19 Aug 2024 12:41)      HISTORY OF PRESENT ILLNESS:HPI:  89F w/ CHF, AS s/p TAVR, dementia (AOx1), pAF on Xarelto, hypothyroidism, recent admission for COVID-19, with [facility] and hypotension (which improved after discontinuation of anti hypertensives) who presents now with unwitnessed fall at her nursing home with head trauma. Patient is unable to provide history, history obtained from daughter Liza over the phone. She was at the [facility], she was doing okay until yesterday, when she was walking to the [place] and then she fell down and hit her head and her knee. She did not pass out at that time. Normally walks with a walker, does not require anyone assisting her. Has been having multiple falls, once a year for the past 7 years. NO fevers, chills, no burning with urination, no changes in urination. Other than this instance, has been in okay health.  (18 Aug 2024 16:49)      PAST MEDICAL & SURGICAL HISTORY:  Paroxysmal atrial fibrillation      Aortic stenosis      Hypertension      Hyperlipidemia      Chronic combined systolic and diastolic heart failure      Hypothyroidism      Cognitive decline      Chronic mood disorder      Recurrent UTI      No significant past surgical history              MEDICATIONS:  metoprolol succinate ER 50 milliGRAM(s) Oral daily        ALPRAZolam 0.25 milliGRAM(s) Oral daily PRN  melatonin 5 milliGRAM(s) Oral at bedtime  QUEtiapine 12.5 milliGRAM(s) Oral daily  sertraline 150 milliGRAM(s) Oral daily  traZODone 150 milliGRAM(s) Oral daily      atorvastatin 40 milliGRAM(s) Oral at bedtime  levothyroxine 88 MICROGram(s) Oral daily    ascorbic acid 500 milliGRAM(s) Oral daily  cholecalciferol 2000 Unit(s) Oral daily      FAMILY HISTORY:  No pertinent family history in first degree relatives        Non-contributory    SOCIAL HISTORY:  not a current smoker  Allergies    Macrobid (Other)    Intolerances    	    REVIEW OF SYSTEMS:  CONSTITUTIONAL: No fever  EYES: No eye pain, visual disturbances, or discharge  ENMT:  No difficulty hearing, tinnitus  NECK: No pain or stiffness  RESPIRATORY: No cough, wheezing,  CARDIOVASCULAR: No chest pain, palpitations, passing out, dizziness, or leg swelling  GASTROINTESTINAL:  No nausea, vomiting, diarrhea or constipation. No melena.  GENITOURINARY: No dysuria, hematuria  NEUROLOGICAL: No stroke like symptoms  SKIN: No burning or lesions   ENDOCRINE: No heat or cold intolerance  MUSCULOSKELETAL: No joint pain or swelling  PSYCHIATRIC: No  anxiety, mood swings  HEME/LYMPH: No bleeding gums  ALLERGY AND IMMUNOLOGIC: No hives or eczema	    All other ROS negative    PHYSICAL EXAM:  T(C): 36.7 (08-19-24 @ 16:15), Max: 36.9 (08-18-24 @ 20:46)  HR: 85 (08-19-24 @ 16:15) (68 - 100)  BP: 120/83 (08-19-24 @ 16:15) (118/68 - 148/83)  RR: 18 (08-19-24 @ 16:15) (18 - 18)  SpO2: 98% (08-19-24 @ 16:15) (94% - 99%)  Wt(kg): --  I&O's Summary    18 Aug 2024 07:01  -  19 Aug 2024 07:00  --------------------------------------------------------  IN: 250 mL / OUT: 0 mL / NET: 250 mL    19 Aug 2024 07:01  -  19 Aug 2024 18:52  --------------------------------------------------------  IN: 250 mL / OUT: 0 mL / NET: 250 mL        Appearance: Normal	  HEENT:   Normal oral mucosa, EOMI	  Cardiovascular:  S1 S2, No JVD,    Respiratory: Lungs clear to auscultation	  Psychiatry: Alert  Gastrointestinal:  Soft, Non-tender, + BS	  Skin: No rashes, + facial trama   Neurologic: Non-focal  Extremities:  No edema  Vascular: Peripheral pulses palpable    	    	  	  CARDIAC MARKERS:  Labs personally reviewed by me                                  10.7   7.13  )-----------( 147      ( 19 Aug 2024 06:53 )             32.2     08-19    140  |  108  |  14  ----------------------------<  86  3.9   |  21<L>  |  0.87    Ca    9.1      19 Aug 2024 06:53  Phos  2.5     08-19  Mg     2.0     08-19            EKG: Personally reviewed by me - Afib incomplete RBBB  Radiology: Personally reviewed by me -   < from: Xray Chest 1 View AP/PA (08.17.24 @ 17:42) >  IMPRESSION:  No focal consolidation.      < end of copied text >  < from: Xray Pelvis AP only (08.17.24 @ 17:41) >  IMPRESSION: Chronic appearing fractures of the superior inferior pubic   rami. No acute fracture. If there is concern for an occult fracture, CT   is recommended.          Assessment /Plan:     89F w/ CHF, AS s/p TAVR, dementia (AOx1), pAF on Xarelto, hypothyroidism, recent admission for COVID-19, with [facility] and hypotension (which improved after discontinuation of anti hypertensives) who presents now with unwitnessed fall at her nursing home with head trauma. Patient is unable to provide history, history obtained from daughter Liza over the phone. She was at the [facility], she was doing okay until yesterday, when she was walking [place] and then she fell down and hit her head and her knee. She did not pass out at that time.    1. Fall  - unwitnessed fall as noted in hpi. Spoke with maite Mcdaniel on the phone who states that her mother was recently moved to the [place] for care, and her medications have changed  - pt is mostly non compliant with exam but denies dizziness at this time  - non compliant with orthos, recheck when able to  - CT head- small frontal hematoma, continue to hold Xarelto 15mg qd for now  - cxr clear  - bp okay at this time, pt with recent admission for hypotension as noted, but lasix and entresto were stopped at that time and have not been resumed  - pt also taking multiple sedating medications PTA including xanax, trazadone, seroquel.  Per daughter Violeta, pt had been taking xanax prn prior to living at the [place], where it is now a med she is receiving daily  - check TTE    2. Paroxymal Atrial Fibriallation  - c/w metoprolol 50mqd  - xarelto 15mg qd on hold for now    3. Chronic sHF  - euvolemic  - proBNP 2614  - c/w metoprolol 50mg qd  - lasix and entresto previously d/c 2/2 hypotension  - await TTE    4. DVT ppx  - SCDs        Differential diagnosis and plan of care discussed with patient after the evaluation. Counseling on diet, nutritional counseling, weight management, exercise and medication compliance was done.   Advanced care planning/advanced directives discussed with patient/family. DNR status including forceful chest compressions to attempt to restart the heart, ventilator support/artificial breathing, electric shock, artificial nutrition, health care proxy, Molst form all discussed with pt. Pt wishes to consider. More than fifteen minutes spent on discussing advanced directives.     Iolani Behrbom, AG-NP  Ladarius Lopez DO [facility]  Cardiovascular Medicine  [address] [place] [address] Dr, [address]  Available for call or text via [employer]  Office [phone]   DATE OF SERVICE: 08-19-24 @ 18:52    CHIEF COMPLAINT:Patient is a 89y old  Female who presents with a chief complaint of Falls (19 Aug 2024 12:41)      HISTORY OF PRESENT ILLNESS:HPI:  89F w/ CHF, AS s/p TAVR, dementia (AOx1), pAF on Xarelto, hypothyroidism, recent admission for COVID-19, with [facility] and hypotension (which improved after discontinuation of anti hypertensives) who presents now with unwitnessed fall at her nursing home with head trauma. Patient is unable to provide history, history obtained from daughter Liza over the phone. She was at the [facility], she was doing okay until yesterday, when she was walking [place] the [place] and then she fell down and hit her head and her knee. She did not pass out at that time. Normally walks with a walker, does not require anyone assisting her. Has been having multiple falls, once a year for the past 7 years. NO fevers, chills, no burning with urination, no changes in urination. Other than this instance, has been in okay health.  (18 Aug 2024 16:49)      PAST MEDICAL & SURGICAL HISTORY:  Paroxysmal atrial fibrillation      Aortic stenosis      Hypertension      Hyperlipidemia      Chronic combined systolic and diastolic heart failure      Hypothyroidism      Cognitive decline      Chronic mood disorder      Recurrent UTI      No significant past surgical history              MEDICATIONS:  metoprolol succinate ER 50 milliGRAM(s) Oral daily        ALPRAZolam 0.25 milliGRAM(s) Oral daily PRN  melatonin 5 milliGRAM(s) Oral at bedtime  QUEtiapine 12.5 milliGRAM(s) Oral daily  sertraline 150 milliGRAM(s) Oral daily  traZODone 150 milliGRAM(s) Oral daily      atorvastatin 40 milliGRAM(s) Oral at bedtime  levothyroxine 88 MICROGram(s) Oral daily    ascorbic acid 500 milliGRAM(s) Oral daily  cholecalciferol 2000 Unit(s) Oral daily      FAMILY HISTORY:  No pertinent family history in first degree relatives        Non-contributory    SOCIAL HISTORY:  not a current smoker  Allergies    Macrobid (Other)    Intolerances    	    REVIEW OF SYSTEMS:  CONSTITUTIONAL: No fever  EYES: No eye pain, visual disturbances, or discharge  ENMT:  No difficulty hearing, tinnitus  NECK: No pain or stiffness  RESPIRATORY: No cough, wheezing,  CARDIOVASCULAR: No chest pain, palpitations, passing out, dizziness, or leg swelling  GASTROINTESTINAL:  No nausea, vomiting, diarrhea or constipation. No melena.  GENITOURINARY: No dysuria, hematuria  NEUROLOGICAL: No stroke like symptoms  SKIN: No burning or lesions   ENDOCRINE: No heat or cold intolerance  MUSCULOSKELETAL: No joint pain or swelling  PSYCHIATRIC: No  anxiety, mood swings  HEME/LYMPH: No bleeding gums  ALLERGY AND IMMUNOLOGIC: No hives or eczema	    All other ROS negative    PHYSICAL EXAM:  T(C): 36.7 (08-19-24 @ 16:15), Max: 36.9 (08-18-24 @ 20:46)  HR: 85 (08-19-24 @ 16:15) (68 - 100)  BP: 120/83 (08-19-24 @ 16:15) (118/68 - 148/83)  RR: 18 (08-19-24 @ 16:15) (18 - 18)  SpO2: 98% (08-19-24 @ 16:15) (94% - 99%)  Wt(kg): --  I&O's Summary    18 Aug 2024 07:01  -  19 Aug 2024 07:00  --------------------------------------------------------  IN: 250 mL / OUT: 0 mL / NET: 250 mL    19 Aug 2024 07:01  -  19 Aug 2024 18:52  --------------------------------------------------------  IN: 250 mL / OUT: 0 mL / NET: 250 mL        Appearance: Normal	  HEENT:   Normal oral mucosa, EOMI	  Cardiovascular:  S1 S2, No JVD,    Respiratory: Lungs clear to auscultation	  Psychiatry: Alert  Gastrointestinal:  Soft, Non-tender, + BS	  Skin: No rashes, + facial trama   Neurologic: Non-focal  Extremities:  No edema  Vascular: Peripheral pulses palpable    	    	  	  CARDIAC MARKERS:  Labs personally reviewed by me                                  10.7   7.13  )-----------( 147      ( 19 Aug 2024 06:53 )             32.2     08-19    140  |  108  |  14  ----------------------------<  86  3.9   |  21<L>  |  0.87    Ca    9.1      19 Aug 2024 06:53  Phos  2.5     08-19  Mg     2.0     08-19            EKG: Personally reviewed by me - Afib incomplete RBBB  Radiology: Personally reviewed by me -   < from: Xray Chest 1 View AP/PA (08.17.24 @ 17:42) >  IMPRESSION:  No focal consolidation.      < end of copied text >  < from: Xray Pelvis AP only (08.17.24 @ 17:41) >  IMPRESSION: Chronic appearing fractures of the superior inferior pubic   rami. No acute fracture. If there is concern for an occult fracture, CT   is recommended.          Assessment /Plan:     89F w/ CHF, AS s/p TAVR, dementia (AOx1), pAF on Xarelto, hypothyroidism, recent admission for COVID-19, with [facility] and hypotension (which improved after discontinuation of anti hypertensives) who presents now with unwitnessed fall at her nursing home with head trauma. Patient is unable to provide history, history obtained from daughter Liza over the phone. She was at the [facility], she was doing okay until yesterday, when she was walking to the [place] and then she fell down and hit her head and her knee. She did not pass out at that time.    1. Fall  - unwitnessed fall as noted in hpi. Spoke with maite Mcdaniel on the phone who states that her mother was recently moved to the [place] for care, and her medications have changed  - pt is mostly non compliant with exam but denies dizziness at this time  - non compliant with orthos, recheck when able to  - CT head- small frontal hematoma, continue to hold Xarelto 15mg qd for now  - cxr clear  - bp okay at this time, pt with recent admission for hypotension as noted, but lasix and entresto were stopped at that time and have not been resumed  - pt also taking multiple sedating medications PTA including xanax, trazadone, seroquel.  Per daughter Violeta, pt had been taking xanax prn prior to living at the [place], where it is now a med she is receiving daily  - check TTE    2. Paroxymal Atrial Fibriallation  - c/w metoprolol 50mqd  - xarelto 15mg qd on hold for now    3. Chronic sHF  - euvolemic  - proBNP 2614  - c/w metoprolol 50mg qd  - lasix and entresto previously d/c 2/2 hypotension  - await TTE    4. DVT ppx  - SCDs    Dr Shorty Tidwell will be covering for Dr. Lopez 8/19-8/22.    Differential diagnosis and plan of care discussed with patient after the evaluation. Counseling on diet, nutritional counseling, weight management, exercise and medication compliance was done.   Advanced care planning/advanced directives discussed with patient/family. DNR status including forceful chest compressions to attempt to restart the heart, ventilator support/artificial breathing, electric shock, artificial nutrition, health care proxy, Molst form all discussed with pt. Pt wishes to consider. More than fifteen minutes spent on discussing advanced directives.     Iolani Behrbom, AG-NP  Ladarius Lopez DO [facility]  Cardiovascular Medicine  [address] [place] [address] Dr, [address]  Available for call or text via [employer]  Office [phone]

## 2024-08-20 ENCOUNTER — RESULT REVIEW (OUTPATIENT)
Age: 89
End: 2024-08-20

## 2024-08-20 RX ORDER — LORAZEPAM 4 MG/ML
0.5 INJECTION INTRAMUSCULAR; INTRAVENOUS ONCE
Refills: 0 | Status: DISCONTINUED | OUTPATIENT
Start: 2024-08-20 | End: 2024-08-20

## 2024-08-20 RX ADMIN — Medication 5 MILLIGRAM(S): at 22:01

## 2024-08-20 RX ADMIN — Medication 88 MICROGRAM(S): at 06:53

## 2024-08-20 RX ADMIN — LORAZEPAM 0.5 MILLIGRAM(S): 4 INJECTION INTRAMUSCULAR; INTRAVENOUS at 02:22

## 2024-08-20 RX ADMIN — METOPROLOL TARTRATE 50 MILLIGRAM(S): 100 TABLET ORAL at 06:53

## 2024-08-20 RX ADMIN — Medication 40 MILLIGRAM(S): at 22:01

## 2024-08-20 NOTE — PROVIDER CONTACT NOTE (OTHER) - ASSESSMENT
Patient is A&O*1 and is hemodynamically stable. Patient 12pm medications were offered crushed and whole and on both attempts patient spitted medications out at RN. Patient caregiver at bedside also encouraged patient to her medications. Patient was educated by RN  about the importance of taking her medications.

## 2024-08-20 NOTE — DIETITIAN INITIAL EVALUATION ADULT - PROBLEM SELECTOR PLAN 2
[id number] at least 5 points ->Stroke 7.2%  - spoke to daughter eldon and explained high risk of stroke vs high risk of bleeding given her frequent falls, fractures and current scalp hematoma. we decided to hold xarelto for now, and eldon will speak to her other sister and discuss if they want their mother to restart xarelto in the near future  - c/w home metoprolol   - will consult Dr. Lopez to assist in management given complicated cardiac history and TAVR history   - obtain TTE ( previously documented HF, but no TTE in chart)

## 2024-08-20 NOTE — DIETITIAN INITIAL EVALUATION ADULT - NS FNS DIET ORDER
Diet, DASH/TLC:   Sodium & Cholesterol Restricted  Easy to Chew (EASYTOCHEW)  Supplement Feeding Modality:  Oral  Ensure Plus High Protein Cans or Servings Per Day:  1       Frequency:  Two Times a day (08-18-24 @ 17:08) [Active]

## 2024-08-20 NOTE — DIETITIAN INITIAL EVALUATION ADULT - REASON INDICATOR FOR ASSESSMENT
Consult-Nutrition Services Assessment/ Education  Information obtained from: Review of pt's current medical record, interview with pt's private aide; pt with dementia, was unable to participate in interview

## 2024-08-20 NOTE — DIETITIAN INITIAL EVALUATION ADULT - PROBLEM SELECTOR PLAN 7
diet - easy to chew (previous diet in july)  dvt ppx - scds  dispo - pending tte, pt/ot  *will need to clarify home meds with cristopher in the AM, as no one was reached. family says medications were not changed since last hospitalization  - called daughter eldon today 8/18 [phone]

## 2024-08-20 NOTE — PROGRESS NOTE ADULT - PROBLEM SELECTOR PLAN 2
- [id number] at least 5 points ->Stroke 7.2%  - daughter wants to hold off on AC for now given high risk of falls and bleed-- holding xarelto for now, may resume in the future   - c/w home metoprolol   - TTE pending  - cards following

## 2024-08-20 NOTE — DIETITIAN INITIAL EVALUATION ADULT - PROBLEM SELECTOR PLAN 1
Fall seems mechanical, no reported lightheadedness or dizziness or presyncopal episodes  - CT scan shows frontal scalp hematoma, and xrays shows chronic pubic rami fracture  - PT/OT eval pending but family would not want rehab, only home PT  - avoid oversedating medications

## 2024-08-20 NOTE — DIETITIAN INITIAL EVALUATION ADULT - NS FNS REASON FOR WEIGHT CHANG
Weights:    Drug Dosing Weight: 40.8 kg/ 89.9 pounds  (8/17/24)  Daily Weight: None     Weight History per previous RD documentation:   - 100 pounds (7/10/24)    Weight Change:  - Noted with significant weight loss X 1 month, see data below for further details

## 2024-08-20 NOTE — PROVIDER CONTACT NOTE (OTHER) - SITUATION
Pt is admitted under medicine but the tele order from ER is still active. Verification needed to see if pt should be on tele or not
Patient is refusing her 12pm medication.

## 2024-08-20 NOTE — DIETITIAN INITIAL EVALUATION ADULT - PERTINENT MEDS FT
MEDICATIONS  (STANDING):  ascorbic acid 500 milliGRAM(s) Oral daily  atorvastatin 40 milliGRAM(s) Oral at bedtime  cholecalciferol 2000 Unit(s) Oral daily  levothyroxine 88 MICROGram(s) Oral daily  melatonin 5 milliGRAM(s) Oral at bedtime  metoprolol succinate ER 50 milliGRAM(s) Oral daily  QUEtiapine 12.5 milliGRAM(s) Oral daily  sertraline 150 milliGRAM(s) Oral daily  traZODone 150 milliGRAM(s) Oral daily    MEDICATIONS  (PRN):  ALPRAZolam 0.25 milliGRAM(s) Oral daily PRN anxiety

## 2024-08-20 NOTE — DIETITIAN INITIAL EVALUATION ADULT - PROBLEM/PLAN-2
"Progress Note - NICU   Baby Kristy Cantu (Brittney) 2 days female MRN: [id number]  Unit/Bed#: NICU 22 Encounter: [id number]      Patient Active Problem List   Diagnosis    Single liveborn infant, delivered vaginally    RDS (respiratory distress syndrome in the )    Prematurity, 1,250-1,499 grams, 29-30 completed weeks    Asymptomatic  w/confirmed group B Strep maternal carriage    Apnea of prematurity       Subjective/Objective     SUBJECTIVE: Baby Kristy Cantu (Brittney) is now 2 days old, currently adjusted at 30w 3d weeks gestation. Stable on CPAP. On trophic feed, advancing. On TPN/IL. Starting phototherapy today.      OBJECTIVE:     Vitals:   BP (!) 72/32 (BP Location: Left leg)   Pulse 146   Temp 98.2 °F (36.8 °C) (Axillary)   Resp 42   Ht 14.96\" (38 cm)   Wt (!) 1360 g (3 lb)   HC 27 cm (10.63\")   SpO2 100%   BMI 9.42 kg/m²   48 %ile (Z= -0.06) based on Luc[place] (Girls, 22-50 Weeks) head circumference-for-age based on Head Circumference recorded on 2024.   Weight change:     I/O:  I/O          0701   0700  0701   0700    I.V. (mL/kg) 107.13 (78.77) 111.98 (82.34)    Other 5.36 3.51    IV Piggyback 6.05 2.27    TPN  44.67    Feedings  20    Total Intake(mL/kg) 118.54 (87.16) 182.43 (134.14)    Urine (mL/kg/hr) 132 (4.04) 128 (3.92)    Stool  0    Total Output 132 128    Net -13.46 +54.43          Unmeasured Stool Occurrence  3 x              Feeding:       FEEDING TYPE: Feeding Type: Donor breast milk    BREASTMILK CANELO/OZ (IF FORTIFIED): Breast Milk canelo/oz: 20 Kcal   FORTIFICATION (IF ANY):     FEEDING ROUTE: Feeding Route: OG tube   WRITTEN FEEDING VOLUME: Breast Milk Dose (ml): 3 mL   LAST FEEDING VOLUME GIVEN PO:     LAST FEEDING VOLUME GIVEN NG: Breast Milk - Tube (mL): 3 mL       IVF: TPN/IL      Respiratory settings:         FiO2 (%):  [21] 21    ABD events: 0 ABDs, 0 self resolved, 0 stimulation    Current Facility-Administered Medications   Medication " Dose Route Frequency Provider Last Rate Last Admin    caffeine citrate (CAFCIT) injection 10.2 mg  7.5 mg/kg Intravenous Q24H Itz Agarwal MD   10.2 mg at 24 0805    fat emulsion (Intralipid) 20 % in IV syringe 13.6 mL  2 g/kg Intravenous Continuous TPN Junior Delacruz MD 0.57 mL/hr at 24 2225 2.72 g at 24 2225    heparin flush in sodium acetate 0.45% 0.5 units/mL 10 mL flush syringe  1 mL Intravenous Q1H PRN Itz Agarwal MD   1 mL at 24 2140     2-in-1 TPN (less than or equal to 35 weeks)   Intravenous Continuous TPN Junior Delacruz MD 6.2 mL/hr at 24 2224 New Bag at 24 2224    sucrose 24 % oral solution 1 mL  1 mL Oral Q5 Min PRN HIRO Gonzalez           Physical Exam:   General Appearance:  Alert, active, no distress  Head:  Normocephalic, AFOF                             Eyes:  Conjunctiva clear  Ears:  Normally placed, no anomalies  Nose: Nares patent                 Respiratory:  No grunting, flaring, retractions, breath sounds clear and equal    Cardiovascular:  Regular rate and rhythm. No murmur. Adequate perfusion/capillary refill.  Abdomen:   Soft, non-distended, no masses, bowel sounds present  Genitourinary:  Normal genitalia  Musculoskeletal:  Moves all extremities equally  Skin/Hair/Nails:   Skin warm, dry, and intact, no rashes               Neurologic:   Normal tone and reflexes    ----------------------------------------------------------------------------------------------------------------------  IMAGING/LABS/OTHER TESTS    Lab Results:   Recent Results (from the past 24 hour(s))   Fingerstick Glucose (POCT)    Collection Time: 24  8:43 AM   Result Value Ref Range    POC Glucose 86 65 - 140 mg/dl   Magnesium    Collection Time: 24  8:55 AM   Result Value Ref Range    Magnesium 3.4 2.0 - 3.9 mg/dL   Phosphorus    Collection Time: 24  8:55 AM   Result Value Ref Range    Phosphorus 7.2 5.6 - 9.0 mg/dL   Basic metabolic panel     Collection Time: 24  8:55 AM   Result Value Ref Range    Sodium 139 135 - 143 mmol/L    Potassium 6.4 (H) 3.7 - 5.9 mmol/L    Chloride 106 100 - 107 mmol/L    CO2 22 18 - 25 mmol/L    ANION GAP 11 4 - 13 mmol/L    BUN 30 (H) 3 - 23 mg/dL    Creatinine 1.00 (H) 0.32 - 0.92 mg/dL    Glucose 76 50 - 100 mg/dL    Calcium 9.1 8.5 - 11.0 mg/dL    eGFR     Bilirubin,     Collection Time: 24  8:55 AM   Result Value Ref Range    Total Bilirubin 7.11 (H) 0.19 - 6.00 mg/dL   CBC and differential    Collection Time: 24 11:01 AM   Result Value Ref Range    WBC 30.45 (H) 5.00 - 20.00 Thousand/uL    RBC 4.46 4.00 - 6.00 Million/uL    Hemoglobin 16.2 15.0 - 23.0 g/dL    Hematocrit 47.4 44.0 - 64.0 %     92 - 115 fL    MCH 36.3 (H) 27.0 - 34.0 pg    MCHC 34.2 31.4 - 37.4 g/dL    RDW 16.5 (H) 11.6 - 15.1 %    MPV 10.1 8.9 - 12.7 fL    Platelets 384 149 - 390 Thousands/uL   Manual Differential(PHLEBS Do Not Order)    Collection Time: 24 11:01 AM   Result Value Ref Range    Segmented % 65 (H) 15 - 35 %    Bands % 1 0 - 8 %    Lymphocytes % 19 (L) 40 - 70 %    Monocytes % 13 (H) 4 - 12 %    Eosinophils % 2 0 - 6 %    Basophils % 0 0 - 1 %    Absolute Neutrophils 20.10 (H) 0.75 - 7.00 Thousand/uL    Absolute Lymphocytes 5.79 2.00 - 14.00 Thousand/uL    Absolute Monocytes 3.96 (H) 0.17 - 1.22 Thousand/uL    Absolute Eosinophils 0.61 (H) 0.00 - 0.06 Thousand/uL    Absolute Basophils 0.00 0.00 - 0.10 Thousand/uL    Total Counted      nRBC 2 0 - 2 /100 WBC    RBC Morphology Present     Platelet Estimate Adequate Adequate    Anisocytosis Present     Macrocytes Present     Poikilocytes Present     Polychromasia Present    Fingerstick Glucose (POCT)    Collection Time: 24  1:42 AM   Result Value Ref Range    POC Glucose 137 65 - 140 mg/dl       Imaging: No results found.    Other Studies:  none    ----------------------------------------------------------------------------------------------------------------------    Expand All Collapse All    H&P Exam - NICU   Baby Kristy Cantu (Brittney) 0 days female MRN: [id number]  Unit/Bed#: NICU 22 Encounter: [id number]        History of Present Illness   HPI:  Baby Kristy Cantu (Brittney) is a 1360 g (3 lb) product at 30w0d born to a 28 y.o.  G 1 P 0 mother with an LILIA of 24.       She has the following prenatal labs:      Prenatal Labs        Lab Results   Component Value Date/Time     ABO Grouping O 2024 05:57 AM     Rh Factor Positive 2024 05:57 AM     Rh Type Positive 2023 11:38 AM     Hepatitis B Surface Ag negative 2023 12:00 AM     HEP C AB Non Reactive 2023 11:38 AM     RPR Non Reactive 2024 10:48 AM     HIV-1/HIV-2 AB Non-Reactive 2023 12:00 AM     Glucose 104 2024 10:48 AM         Externally resulted Prenatal labs        Lab Results   Component Value Date/Time     External Chlamydia Screen negative 10/27/2023 12:00 AM     External Rubella IGG Quantitation immune 2023 12:00 AM            Pregnancy complications: placental abruption and  labor.   Fetal Complications: none.     Maternal medical history:  medical marijuana     Medications at home:  PTA medications:       Medications Prior to Admission   Medication    acetaminophen (TYLENOL) 325 mg tablet    cephalexin (KEFLEX) 500 mg capsule    Prenatal-FeFum-FA-DHA w/o A (Prenatal + DHA) 27-1 & 250 MG THPK         Maternal social history: marijuana.       Maternal  medications: betamethasone on 3/9-3/10  Maternal delivery medications: Intrapartum antibiotics:  Penicillin   Anesthesia: Epidural [254],       DELIVERY PROVIDER: Dr. Praneeth Yuen MD  Labor was: Spontaneous [1]  Induction:    Indications for induction:    ROM Date: 2024  ROM Time: 4:15 AM  Length of ROM: 1h 47m                Fluid Color:  "Clear;Pink;Other (Comment)     Additional  information:  Forceps:    No [0]   Vacuum:    No [0]   Number of pop offs: None   Presentation: Nuchal [2]         Cord Complications: Vertex [1]  Nuchal Cord #:  1  Nuchal Cord Description: Loose   Delayed Cord Clamping: No  OB Suspicion of Chorio: no     Birth information:  YOB: 2023   Time of birth: 6:02 AM   Sex: female   Delivery type: Vaginal, Spontaneous   Gestational Age: 30w1d            APGARS  One minute Five minutes Ten minutes   Totals: 4  8             Patient admitted to NICU from delivery room for the following indications: prematurity, sepsis, and respiratory distress. Resuscitation comments: born with good cry and activity level. Delayed cord calmping deferred due to suspected placental abruption. Brought to warmer, active, crying and good color and breathing pattern, HR>100. CPAP initiated due to shallow breathing. However, she became apneic. PPV was initiated and Fio2 up to 100%. Switched back to CPAP after 30sec of PPV. And Fio2 weaned down to 21%. Patient was transported via: radiant warmer           Objective   Vitals:   Temperature: 98 °F (36.7 °C)  Pulse: 126  Respirations: 38  Height: 14.96\" (38 cm)  Weight: (!) 1360 g (3 lb)     Physical Exam:   General Appearance:  Alert, active, no distress  Head:  Normocephalic, AFOF, mild head molding                                             Eyes:  Conjunctiva clear, RR present bilaterally  Ears:  Normally placed, no anomalies  Nose: Nares patent                    Respiratory:  No grunting, flaring, retractions, breath sounds clear and equal    Cardiovascular:  Regular rate and rhythm. No murmur. Adequate perfusion/capillary refill.  Abdomen:   Soft, non-distended, no masses, bowel sounds present  Genitourinary:  Normal female genitalia, anus patent  Musculoskeletal:  Moves all extremities equally  Skin/Hair/Nails:   Skin warm, dry, and intact, no rashes               Neurologic:   Normal " tone and reflexes for gestational age              ASSESSMENT/PLAN     GESTATIONAL AGE: AGA at 30 weeks with issues of prematurity, apnea of prematurity, RDS, SGA, presumed sepsis, maternal history of marijuana use, and suspected abruption.     Requires intensive monitoring for prematurity, RDS, presumed sepsis. High probability of life threatening clinical deterioration in infant's condition without treatment.      PLAN:  - Isolette for thermoregulation, humidity per protocol  - Initial  screen at 24-48hrs of life  - Repeat  screen 48hrs off TPN  - Speech/PT consult when stable  - Ophthalmology consult per protocol  - Routine pre-discharge screenings including car seat test     RESPIRATORY: required CPAP and PPV at birth. Transferred to the NICU on CPAP, Fio2 21%.     3/16 CXR- mild granularity, good lung inflation, consistent with mild RDS, no free air     Requires intensive monitoring for RDS, apnea of prematurity. High probability of life threatening clinical deterioration in infant's condition without treatment.      PLAN:  - Monitor on CPAP PEEP 5 until at least 32 weeks of corrected GA  - Goal saturations > 90%     CARDIAC: hemodynamically stable, good perfusion, BP 71/44.     Requires intensive monitoring for risk of PDA, risk of hypotension. High probability of life threatening clinical deterioration in infant's condition without treatment.      PLAN:  - Monitor closely     FEN/GI: NPO on admission     Requires intensive monitoring for hypoglycemia and nutritional deficiency. High probability of life threatening clinical deterioration in infant's condition without treatment.      PLAN:  - On trophic feeds. Will start advancing feeds by 3 ml daily  - continue TPN/IL for TFG of 120 ml/kg/day  - Monitor I/O, adjust TF PRN  - Monitor weight  - Encourage maternal lactation  - BM/phos/mag in am     ID: Sepsis eval-due to prematurity, active labor and umbilical lines placement will evaluate for  sepsis and start antibiotics.     Requires intensive monitoring for sepsis. High probability of life threatening clinical deterioration in infant's condition without treatment.      PLAN:  - Continue amp + gent for 36 hours  - follow blood culture   - Monitor closely     HEME: initial hematocrit 44 on the VBG     Requires intensive monitoring for anemia. High probability of life threatening clinical deterioration in infant's condition without treatment.      PLAN:  - Monitor clinically  - Trend Hct on CBG, CBC periodically  - Start Fe when medically appropriate     JAUNDICE: Mom O+, Ab -neg.  Baby - pending, JOSHUA/Isaac pending   Tbili 7.11 @ 24 HOL    Requires intensive monitoring for hyperbilirubinemia. High probability of life threatening clinical deterioration in infant's condition without treatment.      PLAN:  - Tbili at in am  - Initiate phototherapy     ROP: at low risk. Will screen at 4 weeks of life     PLAN: ROP screen at 4 weeks of life     NEURO: AGA. No obvious dysmorphic features.     PLAN:  -HUS at one week of life, 3/22  - Monitor clinically  - Speech, OT/PT when medically appropriate     SOCIAL: intact family. Father present at birth.     COMMUNICATION: I updated parents at bedside on the progress, and the plan of care                  DISPLAY PLAN FREE TEXT

## 2024-08-20 NOTE — DIETITIAN INITIAL EVALUATION ADULT - REASON FOR ADMISSION
Chart Reviewed, Events Noted  "Patient is a 89y old  Female who presents with a chief complaint of Falls"

## 2024-08-20 NOTE — DIETITIAN INITIAL EVALUATION ADULT - NSFNSNUTRCHEWSWALLOWFT_GEN_A_CORE
Pt ordered for modified texture diet. Defer diet/texture advancement to medical team/SLP as indicated

## 2024-08-20 NOTE — DIETITIAN INITIAL EVALUATION ADULT - ADD RECOMMEND
1. Consider diet liberalization to low sodium. Defer diet/texture advancement to medical team/SLP as indicated   2. Continue Ensure Plus High Protein 2x/day   3. Continue Micronutrient supplementation Vitamin C and Vitamin D if no medical contraindications; consider adding daily Multivitamin   4. Encourage adequate PO intakes as tolerated. Staff to provide assistance with feeding during meal times as warranted by patient   5. Monitor PO intake, GI tolerance, skin integrity and labs. RD remains available if needed; monitor for malnutrition as able

## 2024-08-20 NOTE — DIETITIAN INITIAL EVALUATION ADULT - PERTINENT LABORATORY DATA
08-19    140  |  108  |  14  ----------------------------<  86  3.9   |  21<L>  |  0.87    Ca    9.1      19 Aug 2024 06:53  Phos  2.5     08-19  Mg     2.0     08-19    A1C with Estimated Average Glucose Result: 5.3 % (07-17-24 @ 23:55)

## 2024-08-20 NOTE — DIETITIAN INITIAL EVALUATION ADULT - NUTRITIONGOAL OUTCOME1
Pt to meet >75% of estimated nutritional needs during hospital stay to promote weight gain towards BMI >/=24.0

## 2024-08-20 NOTE — PROVIDER CONTACT NOTE (OTHER) - ACTION/TREATMENT ORDERED:
Order for tele. Per tele tech, pt is in afib 80s. Pt has a hx afib. Provider made aware.
No further interventions at this time.

## 2024-08-20 NOTE — PROGRESS NOTE ADULT - PROBLEM SELECTOR PLAN 4
- euvolemic  - c/w metoprolol   - lasix and entresto discontinued in recent hospitalization for hypotension-- however, bp appears stable, may resume meds pending tte   - tte pending  - cards following

## 2024-08-20 NOTE — DIETITIAN INITIAL EVALUATION ADULT - ORAL INTAKE PTA/DIET HISTORY
Per pt's private aide:  Pt resides at [facility], had a good appetite. No known food allergies/intolerances per chart review

## 2024-08-20 NOTE — DIETITIAN INITIAL EVALUATION ADULT - PROBLEM SELECTOR PLAN 3
AOX1 at baseline, normally can walk with walker  - c/w home  seroquel, xanax, sertraline, trazodone  - from [facility] unit

## 2024-08-21 ENCOUNTER — TRANSCRIPTION ENCOUNTER (OUTPATIENT)
Age: 89
End: 2024-08-21

## 2024-08-21 VITALS
HEART RATE: 84 BPM | DIASTOLIC BLOOD PRESSURE: 67 MMHG | RESPIRATION RATE: 18 BRPM | OXYGEN SATURATION: 95 % | TEMPERATURE: 98 F | SYSTOLIC BLOOD PRESSURE: 113 MMHG

## 2024-08-21 RX ORDER — METOPROLOL TARTRATE 100 MG/1
2 TABLET ORAL
Refills: 0 | DISCHARGE

## 2024-08-21 RX ADMIN — Medication 150 MILLIGRAM(S): at 11:47

## 2024-08-21 RX ADMIN — Medication 0.25 MILLIGRAM(S): at 15:22

## 2024-08-21 RX ADMIN — Medication 2000 UNIT(S): at 11:48

## 2024-08-21 RX ADMIN — Medication 500 MILLIGRAM(S): at 11:46

## 2024-08-21 RX ADMIN — Medication 12.5 MILLIGRAM(S): at 11:51

## 2024-08-21 RX ADMIN — Medication 88 MICROGRAM(S): at 05:22

## 2024-08-21 RX ADMIN — METOPROLOL TARTRATE 50 MILLIGRAM(S): 100 TABLET ORAL at 05:25

## 2024-08-21 RX ADMIN — SERTRALINE HYDROCHLORIDE 150 MILLIGRAM(S): 50 TABLET, FILM COATED ORAL at 11:47

## 2024-08-21 NOTE — PROGRESS NOTE ADULT - NSPROGADDITIONALINFOA_GEN_ALL_CORE
I reviewed the overnight course of events on the unit, re-confirming the patient history. I discussed the care with the patient and their family. The plan of care was discussed with the ACP team and modifications were made to the notation where appropriate. Differential diagnosis and plan of care discussed with patient after the evaluation. Advanced care planning was discussed with patient and family.  Advanced care planning forms were reviewed and discussed.  Risks, benefits and alternatives of cardiac procedures were discussed in detail and all questions were answered. 35 minutes spent on total encounter of which more than fifty percent of the encounter was spent counseling and/or coordinating care by the attending physician.
I reviewed the overnight course of events on the unit, re-confirming the patient history. I discussed the care with the patient and their family. The plan of care was discussed with the ACP team and modifications were made to the notation where appropriate. Differential diagnosis and plan of care discussed with patient after the evaluation. Advanced care planning was discussed with patient and family.  Advanced care planning forms were reviewed and discussed.  Risks, benefits and alternatives of cardiac procedures were discussed in detail and all questions were answered. 35 minutes spent on total encounter of which more than fifty percent of the encounter was spent counseling and/or coordinating care by the attending physician.
disposition: TTE pending, may resume gdmt as pressures tolerate    Alka Silva D.O.  Division of Hospital Medicine  Available on MS Teams
disposition: [facility] Marro[facility] 8/21  discussed with daughter Nicole Grayson 8/21  discussed with acp  discussed with dr cardenas 8/21    Alka Silva D.O.  Division of Hospital Medicine  Available on MS Teams
disposition: TTE, PT/OT, orthostatics, cards pending  discussed with dr pizarro on 8/19- to see patient today     Alka Silva D.O.  Division of Hospital Medicine  Available on MS Teams

## 2024-08-21 NOTE — PROGRESS NOTE ADULT - ASSESSMENT
89F w/ CHF, AS s/p TAVR, dementia (AOx1), pAF on Xarelto, hypothyroidism, recent admission for COVID-19, [facility] (which improved after discontinuation of anti hypertensives) who presents now with unwitnessed fall at her nursing home with head trauma, admitted due to worsening mental status and PT eval. 
89F w/ CHF, AS s/p TAVR, dementia (AOx1), pAF on Xarelto, hypothyroidism, recent admission for COVID-19, [facility] (which improved after discontinuation of anti hypertensives) who presents now with unwitnessed fall at her nursing home with head trauma, admitted due to worsening mental status and PT eval. 
89F w/ CHF, AS s/p TAVR, dementia (AOx1), pAF on Xarelto, hypothyroidism, recent admission for COVID-19, [facility] (which improved after discontinuation of anti hypertensives) who presents now with unwitnessed fall at her nursing home with head trauma, admitted due to worsening mental status and PT eval.

## 2024-08-21 NOTE — PROGRESS NOTE ADULT - PROBLEM SELECTOR PLAN 1
- likely mechanical in nature   - orthostatics pending  - TTE pending  - CT scan shows frontal scalp hematoma, and xrays shows chronic pubic rami fracture  - family wants home PT only, no EWA; PT/OT pending
- likely mechanical in nature   - orthostatics negative   - TTE EF 53%  - CT scan shows frontal scalp hematoma, and xrays shows chronic pubic rami fracture  - PT/OT home PT
- likely mechanical in nature   - orthostatics negative   - TTE pending  - CT scan shows frontal scalp hematoma, and xrays shows chronic pubic rami fracture  - PT/OT home PT

## 2024-08-21 NOTE — PROGRESS NOTE ADULT - SUBJECTIVE AND OBJECTIVE BOX
DATE OF SERVICE: 08-21-24 @ 10:42    Patient is a 89y old  Female who presents with a chief complaint of falls (20 Aug 2024 18:38)      INTERVAL HISTORY: In no acute distress.     REVIEW OF SYSTEMS: Limited participant in ROS  CONSTITUTIONAL: No weakness  EYES/ENT: No visual changes;  No throat pain   NECK: No pain or stiffness  RESPIRATORY: No cough, wheezing; No shortness of breath  CARDIOVASCULAR: No chest pain or palpitations  GASTROINTESTINAL: No abdominal  pain. No nausea, vomiting, or hematemesis  GENITOURINARY: No dysuria, frequency or hematuria  NEUROLOGICAL: No stroke like symptoms  SKIN: No rashes    TELEMETRY Personally reviewed: AF 70-130s  	  MEDICATIONS:  metoprolol succinate ER 50 milliGRAM(s) Oral daily        PHYSICAL EXAM:  T(C): 36.7 (08-21-24 @ 05:23), Max: 37.1 (08-20-24 @ 16:22)  HR: 88 (08-21-24 @ 05:23) (81 - 88)  BP: 124/79 (08-21-24 @ 05:23) (123/70 - 134/83)  RR: 18 (08-21-24 @ 05:23) (18 - 18)  SpO2: 92% (08-21-24 @ 05:23) (92% - 94%)  Wt(kg): --  I&O's Summary        Appearance: In no distress	  HEENT:    PERRL, EOMI	  Cardiovascular:  S1 S2, No JVD  Respiratory: Lungs clear to auscultation	  Gastrointestinal:  Soft, Non-tender, + BS	  Vascularature:  No edema of LE  Psychiatric: Appropriate affect   Neuro: no acute focal deficits           Labs personally reviewed  < from: TTE W or WO Ultrasound Enhancing Agent (08.20.24 @ 10:29) >   1. Left ventricular systolic function is normal with an ejection fraction of 53 % by [employer] method of disks.   2. Normal right ventricular systolic function.   3. Moderate mitral regurgitation.   4. No pericardial effusion seen.   5. Moderate tricuspid regurgitation.   6. Estimated pulmonary artery systolic pressure is 44 mmHg, consistent with mild pulmonary hypertension.   7. No prior echocardiogram is available for comparison.   8. The inferior vena cava is normal in size measuring 1.99 cm in diameter, (normal <2.1cm) with abnormal inspiratory collapse (abnormal <50%) consistent with mildly elevated right atrial pressure (~8, range 5-10mmHg).   9. There is calcification of the mitral valve annulus.  10. There is normal LV mass and concentric remodeling.        ASSESSMENT/PLAN: 	        89F w/ CHF, AS s/p TAVR, dementia (AOx1), pAF on Xarelto, hypothyroidism, recent admission for COVID-19, with another recent admission at [facility] forlethargy and hypotension (which improved after discontinuation of anti hypertensives) who presents now with unwitnessed fall at her nursing home with head trauma. Patient is unable to provide history, history obtained from daughter Liza over the phone. She was at the [facility], she was doing okay until yesterday, when she was walking to the [place] and then she fell down and hit her head and her knee. She did not pass out at that time.    1. Fall  - unwitnessed fall as noted in hpi. Spoke with maite Mcdaniel on the phone who states that her mother was recently moved to the [facility] for care, and her medications have changed  - pt is mostly non compliant with exam but denies dizziness at this time  - non compliant with orthos, recheck when able to  - CT head- small frontal hematoma, continue to hold Xarelto 15mg qd for now  - cxr clear  - bp okay at this time, pt with recent admission for hypotension as noted, but lasix and entresto were stopped at that time and have not been resumed  - pt also taking multiple sedating medications PTA including xanax, trazadone, seroquel.  Per daughter Violeta, pt had been taking xanax prn prior to living at the [facility], where it is now a med she is receiving daily  - TTE : LV systolic function normal, EF 53 % mod mr, mod tr. Estimated pulmonary artery systolic pressure is 44 mmHg, consistent with mild pulmonary hypertension  The inferior vena cava is normal in size measuring 1.99 cm in diameter, (normal <2.1cm) with abnormal inspiratory collapse (abnormal <50%) consistent with mildly elevated right atrial pressure (~8, range 5-10mmHg). There is calcification of the mitral valve annulus.    2. Paroxymal Atrial Fibriallation  - c/w metoprolol 50mqd  - xarelto 15mg qd on hold for now    3. Chronic sHF  - euvolemic  - proBNP 2614  - c/w metoprolol 50mg qd  - lasix and entresto previously d/c 2/2 hypotension  - TTE noted above    4. DVT ppx  - SCDs    Dr Shorty Tidwell will be covering for Dr. Lopez 8/19-8/22.      Kelly Hendrickson, ROYA-NP   Ladarius Lopez, DO [facility]  Cardiovascular Medicine  [address] [place] [address], [address]  Available through call or text on [employer]  Office: [phone]  
DATE OF SERVICE: 08-20-24 @ 18:39    Patient is a 89y old  Female who presents with a chief complaint of falls (20 Aug 2024 11:01)      INTERVAL HISTORY: lethargic in bed.  Caregiver at bedside    REVIEW OF SYSTEMS:  CONSTITUTIONAL: No weakness  EYES/ENT: No visual changes;  No throat pain   NECK: No pain or stiffness  RESPIRATORY: No cough, wheezing; No shortness of breath  CARDIOVASCULAR: No chest pain or palpitations  GASTROINTESTINAL: No abdominal  pain. No nausea, vomiting, or hematemesis  GENITOURINARY: No dysuria, frequency or hematuria  NEUROLOGICAL: No stroke like symptoms  SKIN: No rashes      	  MEDICATIONS:  metoprolol succinate ER 50 milliGRAM(s) Oral daily        PHYSICAL EXAM:  T(C): 37.1 (08-20-24 @ 16:22), Max: 37.1 (08-20-24 @ 16:22)  HR: 82 (08-20-24 @ 16:22) (82 - 93)  BP: 123/70 (08-20-24 @ 16:22) (123/70 - 157/80)  RR: 18 (08-20-24 @ 16:22) (18 - 18)  SpO2: 93% (08-20-24 @ 16:22) (92% - 93%)  Wt(kg): --  I&O's Summary    19 Aug 2024 07:01  -  20 Aug 2024 07:00  --------------------------------------------------------  IN: 490 mL / OUT: 0 mL / NET: 490 mL          Appearance: In no distress	  HEENT:    PERRL, EOMI	  Cardiovascular:  S1 S2, No JVD  Respiratory: Lungs clear to auscultation	  Gastrointestinal:  Soft, Non-tender, + BS	  Vascularature:  No edema of LE  Psychiatric: Appropriate affect   Neuro: no acute focal deficits                               10.7   7.13  )-----------( 147      ( 19 Aug 2024 06:53 )             32.2     08-19    140  |  108  |  14  ----------------------------<  86  3.9   |  21<L>  |  0.87    Ca    9.1      19 Aug 2024 06:53  Phos  2.5     08-19  Mg     2.0     08-19          Labs personally reviewed      ASSESSMENT/PLAN: 	    89F w/ CHF, AS s/p TAVR, dementia (AOx1), pAF on Xarelto, hypothyroidism, recent admission for COVID-19, with another recent admission at [facility] forlethargy and hypotension (which improved after discontinuation of anti hypertensives) who presents now with unwitnessed fall at her nursing home with head trauma. Patient is unable to provide history, history obtained from daughter Liza over the phone. She was at the [facility], she was doing okay until yesterday, when she was walking [place] and then she fell down and hit her head and her knee. She did not pass out at that time.    1. Fall  - unwitnessed fall as noted in hpi. Spoke with maite Mcdaniel on the phone who states that her mother was recently moved to the [facility] for care, and her medications have changed  - pt is mostly non compliant with exam but denies dizziness at this time  - non compliant with orthos, recheck when able to  - CT head- small frontal hematoma, continue to hold Xarelto 15mg qd for now  - cxr clear  - bp okay at this time, pt with recent admission for hypotension as noted, but lasix and entresto were stopped at that time and have not been resumed  - pt also taking multiple sedating medications PTA including xanax, trazadone, seroquel.  Per daughter Violeta, pt had been taking xanax prn prior to living at the [facility], where it is now a med she is receiving daily  - TTE : LV systolic function normal, EF 53 % mod mr, mod tr. Estimated pulmonary artery systolic pressure is 44 mmHg, consistent with mild pulmonary hypertension  The inferior vena cava is normal in size measuring 1.99 cm in diameter, (normal <2.1cm) with abnormal inspiratory collapse (abnormal <50%) consistent with mildly elevated right atrial pressure (~8, range 5-10mmHg). There is calcification of the mitral valve annulus.    2. Paroxymal Atrial Fibriallation  - c/w metoprolol 50mqd  - xarelto 15mg qd on hold for now    3. Chronic sHF  - euvolemic  - proBNP 2614  - c/w metoprolol 50mg qd  - lasix and entresto previously d/c 2/2 hypotension  - TTE noted above    4. DVT ppx  - SCDs    Dr Shorty Tidwell will be covering for Dr. Lopez 8/19-8/22.            Iolani Behrbom, ROYA-NP   Ladarius Lopez,  [facility]  Cardiovascular Medicine  [address] [place] [address], [address]  Available through call or text on [employer] TE[employer]  Office: [phone]  
no events overnight.    unable to obtain reliable ROS    MEDICATIONS  (STANDING):  ascorbic acid 500 milliGRAM(s) Oral daily  atorvastatin 40 milliGRAM(s) Oral at bedtime  cholecalciferol 2000 Unit(s) Oral daily  levothyroxine 88 MICROGram(s) Oral daily  melatonin 5 milliGRAM(s) Oral at bedtime  metoprolol succinate ER 50 milliGRAM(s) Oral daily  QUEtiapine 12.5 milliGRAM(s) Oral daily  sertraline 150 milliGRAM(s) Oral daily  traZODone 150 milliGRAM(s) Oral daily    MEDICATIONS  (PRN):  ALPRAZolam 0.25 milliGRAM(s) Oral daily PRN anxiety    Vital Signs Last 24 Hrs  T(C): 36.7 (20 Aug 2024 08:21), Max: 36.7 (19 Aug 2024 16:15)  T(F): 98 (20 Aug 2024 08:21), Max: 98.1 (19 Aug 2024 16:15)  HR: 93 (20 Aug 2024 08:21) (85 - 98)  BP: 135/78 (20 Aug 2024 08:21) (118/68 - 157/80)  BP(mean): --  RR: 18 (20 Aug 2024 08:21) (18 - 18)  SpO2: 93% (20 Aug 2024 08:21) (92% - 99%)    Parameters below as of 20 Aug 2024 08:21  Patient On (Oxygen Delivery Method): room air    GENERAL: NAD  HEAD:  Atraumatic, Normocephalic  EYES: EOMI, PERRLA, conjunctiva and sclera clear  ENT: Pharynx not erythematous  PULMONARY: Clear to auscultation bilaterally; No wheeze  CARDIOVASCULAR: Regular rate and rhythm; No murmurs, rubs, or gallops  ABDOMEN: Soft, Nontender, Nondistended; Bowel sounds present  EXTREMITIES:  2+ Peripheral Pulses, No clubbing, cyanosis, or edema  MUSCULOSKELETAL: No calf tenderness  PSYCH: AAOx0  SKIN: warm and dry, No rashes or lesions    .  LABS:                         10.7   7.13  )-----------( 147      ( 19 Aug 2024 06:53 )             32.2     08-19    140  |  108  |  14  ----------------------------<  86  3.9   |  21<L>  |  0.87    Ca    9.1      19 Aug 2024 06:53  Phos  2.5     08-19  Mg     2.0     08-19        Urinalysis Basic - ( 19 Aug 2024 06:53 )    Color: x / Appearance: x / SG: x / pH: x  Gluc: 86 mg/dL / Ketone: x  / Bili: x / Urobili: x   Blood: x / Protein: x / Nitrite: x   Leuk Esterase: x / RBC: x / WBC x   Sq Epi: x / Non Sq Epi: x / Bacteria: x            RADIOLOGY, EKG & ADDITIONAL TESTS: Reviewed. 
no events overnight.    unable to obtain reliable ROS    MEDICATIONS  (STANDING):  ascorbic acid 500 milliGRAM(s) Oral daily  atorvastatin 40 milliGRAM(s) Oral at bedtime  cholecalciferol 2000 Unit(s) Oral daily  levothyroxine 88 MICROGram(s) Oral daily  melatonin 5 milliGRAM(s) Oral at bedtime  metoprolol succinate ER 50 milliGRAM(s) Oral daily  QUEtiapine 12.5 milliGRAM(s) Oral daily  sertraline 150 milliGRAM(s) Oral daily  traZODone 150 milliGRAM(s) Oral daily    MEDICATIONS  (PRN):  ALPRAZolam 0.25 milliGRAM(s) Oral daily PRN anxiety    Vital Signs Last 24 Hrs  T(C): 36.6 (19 Aug 2024 08:43), Max: 36.9 (18 Aug 2024 20:46)  T(F): 97.8 (19 Aug 2024 08:43), Max: 98.4 (18 Aug 2024 20:46)  HR: 90 (19 Aug 2024 08:43) (81 - 100)  BP: 148/83 (19 Aug 2024 08:43) (120/69 - 148/83)  BP(mean): 88 (18 Aug 2024 15:39) (87 - 88)  RR: 18 (19 Aug 2024 08:43) (16 - 18)  SpO2: 98% (19 Aug 2024 08:43) (94% - 98%)    Parameters below as of 19 Aug 2024 08:43  Patient On (Oxygen Delivery Method): room air    GENERAL: NAD  HEAD:  Atraumatic, Normocephalic  EYES: EOMI, PERRLA, conjunctiva and sclera clear  ENT: Pharynx not erythematous  PULMONARY: Clear to auscultation bilaterally; No wheeze  CARDIOVASCULAR: Regular rate and rhythm; No murmurs, rubs, or gallops  ABDOMEN: Soft, Nontender, Nondistended; Bowel sounds present  EXTREMITIES:  2+ Peripheral Pulses, No clubbing, cyanosis, or edema  MUSCULOSKELETAL: No calf tenderness  PSYCH: AAOx1  SKIN: warm and dry, No rashes or lesions    .  LABS:                         10.7   7.13  )-----------( 147      ( 19 Aug 2024 06:53 )             32.2     08-19    140  |  108  |  14  ----------------------------<  86  3.9   |  21<L>  |  0.87    Ca    9.1      19 Aug 2024 06:53  Phos  2.5     08-19  Mg     2.0     08-19    TPro  6.2  /  Alb  3.5  /  TBili  0.3  /  DBili  x   /  AST  18  /  ALT  10  /  AlkPhos  84  08-17    PT/INR - ( 17 Aug 2024 16:29 )   PT: 15.0 sec;   INR: 1.38 ratio         PTT - ( 17 Aug 2024 16:29 )  PTT:35.0 sec  Urinalysis Basic - ( 19 Aug 2024 06:53 )    Color: x / Appearance: x / SG: x / pH: x  Gluc: 86 mg/dL / Ketone: x  / Bili: x / Urobili: x   Blood: x / Protein: x / Nitrite: x   Leuk Esterase: x / RBC: x / WBC x   Sq Epi: x / Non Sq Epi: x / Bacteria: x            RADIOLOGY, EKG & ADDITIONAL TESTS: Reviewed. 
unable to obtain reliable ROS    MEDICATIONS  (STANDING):  ascorbic acid 500 milliGRAM(s) Oral daily  atorvastatin 40 milliGRAM(s) Oral at bedtime  cholecalciferol 2000 Unit(s) Oral daily  levothyroxine 88 MICROGram(s) Oral daily  melatonin 5 milliGRAM(s) Oral at bedtime  metoprolol succinate ER 50 milliGRAM(s) Oral daily  QUEtiapine 12.5 milliGRAM(s) Oral daily  sertraline 150 milliGRAM(s) Oral daily  traZODone 150 milliGRAM(s) Oral daily    MEDICATIONS  (PRN):  ALPRAZolam 0.25 milliGRAM(s) Oral daily PRN anxiety    Vital Signs Last 24 Hrs  T(C): 36.7 (21 Aug 2024 05:23), Max: 37.1 (20 Aug 2024 16:22)  T(F): 98.1 (21 Aug 2024 05:23), Max: 98.8 (20 Aug 2024 16:22)  HR: 88 (21 Aug 2024 05:23) (81 - 88)  BP: 124/79 (21 Aug 2024 05:23) (123/70 - 134/83)  BP(mean): --  RR: 18 (21 Aug 2024 05:23) (18 - 18)  SpO2: 92% (21 Aug 2024 05:23) (92% - 94%)    Parameters below as of 21 Aug 2024 05:23  Patient On (Oxygen Delivery Method): room air    GENERAL: NAD  HEAD:  Atraumatic, Normocephalic  EYES: EOMI, PERRLA, conjunctiva and sclera clear  ENT: Pharynx not erythematous  PULMONARY: Clear to auscultation bilaterally; No wheeze  CARDIOVASCULAR: Regular rate and rhythm; No murmurs, rubs, or gallops  ABDOMEN: Soft, Nontender, Nondistended; Bowel sounds present  EXTREMITIES:  2+ Peripheral Pulses, No clubbing, cyanosis, or edema  MUSCULOSKELETAL: No calf tenderness  PSYCH: AAOx0  SKIN: warm and dry, No rashes or lesions    .  LABS:                     RADIOLOGY, EKG & ADDITIONAL TESTS: Reviewed.

## 2024-08-21 NOTE — PROGRESS NOTE ADULT - PROBLEM SELECTOR PLAN 3
- AAOX1 at baseline, normally can walk with walker  - c/w home seroquel, xanax, sertraline, trazodone  - lives in [place] memory unit
- AAOX1 at baseline, normally can walk with walker  - c/w home seroquel, xanax, sertraline, trazodone  - lives in [place] memory unit
- AAOX1 at baseline, normally can walk with walker  - c/w home seroquel, xanax, sertraline, trazodone  - lives in [place] memory unit

## 2024-08-21 NOTE — PROGRESS NOTE ADULT - PROBLEM SELECTOR PLAN 4
- euvolemic  - c/w metoprolol   - lasix and entresto discontinued in recent hospitalization for hypotension  - tte EF 53%  - cards following

## 2024-08-21 NOTE — PROGRESS NOTE ADULT - PROBLEM SELECTOR PLAN 2
- [id number] at least 5 points ->Stroke 7.2%  - daughter wants to hold off on AC for now given high risk of falls and bleed-- holding xarelto for now  - c/w home metoprolol   - TTE EF 53%  - cards following

## 2024-08-21 NOTE — DISCHARGE NOTE NURSING/CASE MANAGEMENT/SOCIAL WORK - PATIENT PORTAL LINK FT
You can access the FollowMyHealth Patient Portal offered by [facility] by registering at the following website: [email or web address]. By joining [employer]’s FollowMyHealth portal, you will also be able to view your health information using other applications (apps) compatible with our system.

## 2024-08-31 NOTE — ED PROVIDER NOTE - WR ORDER NAME 1
[facility] 1 NEUROSCIENCE TELEMETRY  EMERGENCY DEPARTMENT ENCOUNTER       Pt Name: Yoel Izaguirre  MRN: [id number]  Birthdate 1951  Date of evaluation: 8/30/2024  Provider: Mary Harrell DO   PCP: Felipa Sanchez APRN - NP  Note Started: 3:15 PM 8/31/24     CHIEF COMPLAINT       Chief Complaint   Patient presents with    Aphasia     Patient wheeled into triage with daughter who reports slurred speech and confusion during a phone call 1 hour PTA. Daughter also report inability to bring food to his mouth. Patient present with delayed speech, equal strength in all extremities. Hx dementia. MD Harrell in triage to Evaluate. . LKW 0800 this morning    Fatigue    Altered Mental Status     From home with c/o slurred speech,increase confusion since 0800hrs today.LKW 0800HRS.Hx of dementia and hypertension.        HISTORY OF PRESENT ILLNESS: 1 or more elements      History From: Patient and Patient's Daughter  Altered Mental Status     Yoel Izaguirre is a 73 y.o. male who presents with cc of slurred speech, weakness, difficulty using right hand starting this evening. Last known wellw as when pt went to his adult day program at 0800 this morning. Hx of dementia nad is confused at baseline. Daugther reports confusion is at baseline. No known falls, states he had a normal day at his adult day program. Hx of strokes.      Nursing Notes were all reviewed and agreed with or any disagreements were addressed in the HPI.       PAST HISTORY     Past Medical History:  Past Medical History:   Diagnosis Date    Hypercholesteremia     Memory disorder     Thyroid disease     hypothyroid         Past Surgical History:  Past Surgical History:   Procedure Laterality Date    COLONOSCOPY N/A 11/15/2018    COLONOSCOPY performed by Bertram Guevara MD at [facility] ENDOSCOPY       Family History:  Family History   Problem Relation Age of Onset    Cancer Mother     No Known Problems Father        Social History:  Social History 
Xray Chest 1 View- PORTABLE-Urgent

## 2024-09-02 ENCOUNTER — INPATIENT (INPATIENT)
Facility: HOSPITAL | Age: 89
LOS: 0 days | Discharge: NURSING, REHAB OR LONG-TERM CARE | End: 2024-09-03
Attending: STUDENT IN AN ORGANIZED HEALTH CARE EDUCATION/TRAINING PROGRAM | Admitting: STUDENT IN AN ORGANIZED HEALTH CARE EDUCATION/TRAINING PROGRAM
Payer: MEDICARE

## 2024-09-02 VITALS
RESPIRATION RATE: 16 BRPM | SYSTOLIC BLOOD PRESSURE: 153 MMHG | HEIGHT: 57 IN | HEART RATE: 90 BPM | DIASTOLIC BLOOD PRESSURE: 85 MMHG | WEIGHT: 115.08 LBS | OXYGEN SATURATION: 91 % | TEMPERATURE: 98 F

## 2024-09-02 DIAGNOSIS — F03.90 UNSPECIFIED DEMENTIA, UNSPECIFIED SEVERITY, WITHOUT BEHAVIORAL DISTURBANCE, PSYCHOTIC DISTURBANCE, MOOD DISTURBANCE, AND ANXIETY: ICD-10-CM

## 2024-09-02 DIAGNOSIS — Z29.9 ENCOUNTER FOR PROPHYLACTIC MEASURES, UNSPECIFIED: ICD-10-CM

## 2024-09-02 DIAGNOSIS — R45.1 RESTLESSNESS AND AGITATION: ICD-10-CM

## 2024-09-02 DIAGNOSIS — E03.9 HYPOTHYROIDISM, UNSPECIFIED: ICD-10-CM

## 2024-09-02 DIAGNOSIS — I48.0 PAROXYSMAL ATRIAL FIBRILLATION: ICD-10-CM

## 2024-09-02 DIAGNOSIS — I50.22 CHRONIC SYSTOLIC (CONGESTIVE) HEART FAILURE: ICD-10-CM

## 2024-09-02 DIAGNOSIS — I10 ESSENTIAL (PRIMARY) HYPERTENSION: ICD-10-CM

## 2024-09-02 DIAGNOSIS — N39.0 URINARY TRACT INFECTION, SITE NOT SPECIFIED: ICD-10-CM

## 2024-09-02 DIAGNOSIS — E78.5 HYPERLIPIDEMIA, UNSPECIFIED: ICD-10-CM

## 2024-09-02 LAB
ALBUMIN SERPL ELPH-MCNC: 3.5 G/DL — SIGNIFICANT CHANGE UP (ref 3.3–5)
ALP SERPL-CCNC: 89 U/L — SIGNIFICANT CHANGE UP (ref 40–120)
ALT FLD-CCNC: 14 U/L — SIGNIFICANT CHANGE UP (ref 4–33)
ANION GAP SERPL CALC-SCNC: 12 MMOL/L — SIGNIFICANT CHANGE UP (ref 7–14)
APPEARANCE UR: CLEAR — SIGNIFICANT CHANGE UP
AST SERPL-CCNC: 22 U/L — SIGNIFICANT CHANGE UP (ref 4–32)
BASOPHILS # BLD AUTO: 0.07 K/UL — SIGNIFICANT CHANGE UP (ref 0–0.2)
BASOPHILS NFR BLD AUTO: 0.9 % — SIGNIFICANT CHANGE UP (ref 0–2)
BILIRUB SERPL-MCNC: 0.5 MG/DL — SIGNIFICANT CHANGE UP (ref 0.2–1.2)
BILIRUB UR-MCNC: NEGATIVE — SIGNIFICANT CHANGE UP
BLOOD GAS VENOUS COMPREHENSIVE RESULT: SIGNIFICANT CHANGE UP
BUN SERPL-MCNC: 18 MG/DL — SIGNIFICANT CHANGE UP (ref 7–23)
CALCIUM SERPL-MCNC: 9.3 MG/DL — SIGNIFICANT CHANGE UP (ref 8.4–10.5)
CHLORIDE SERPL-SCNC: 108 MMOL/L — HIGH (ref 98–107)
CO2 SERPL-SCNC: 21 MMOL/L — LOW (ref 22–31)
COLOR SPEC: YELLOW — SIGNIFICANT CHANGE UP
CREAT SERPL-MCNC: 1.04 MG/DL — SIGNIFICANT CHANGE UP (ref 0.5–1.3)
DIFF PNL FLD: NEGATIVE — SIGNIFICANT CHANGE UP
EGFR: 52 ML/MIN/1.73M2 — LOW
EOSINOPHIL # BLD AUTO: 0.22 K/UL — SIGNIFICANT CHANGE UP (ref 0–0.5)
EOSINOPHIL NFR BLD AUTO: 2.9 % — SIGNIFICANT CHANGE UP (ref 0–6)
GLUCOSE SERPL-MCNC: 77 MG/DL — SIGNIFICANT CHANGE UP (ref 70–99)
GLUCOSE UR QL: NEGATIVE MG/DL — SIGNIFICANT CHANGE UP
HCT VFR BLD CALC: 32.4 % — LOW (ref 34.5–45)
HGB BLD-MCNC: 10.5 G/DL — LOW (ref 11.5–15.5)
IANC: 5.24 K/UL — SIGNIFICANT CHANGE UP (ref 1.8–7.4)
IMM GRANULOCYTES NFR BLD AUTO: 0.4 % — SIGNIFICANT CHANGE UP (ref 0–0.9)
KETONES UR-MCNC: NEGATIVE MG/DL — SIGNIFICANT CHANGE UP
LEUKOCYTE ESTERASE UR-ACNC: NEGATIVE — SIGNIFICANT CHANGE UP
LYMPHOCYTES # BLD AUTO: 1.34 K/UL — SIGNIFICANT CHANGE UP (ref 1–3.3)
LYMPHOCYTES # BLD AUTO: 17.4 % — SIGNIFICANT CHANGE UP (ref 13–44)
MCHC RBC-ENTMCNC: 32.2 PG — SIGNIFICANT CHANGE UP (ref 27–34)
MCHC RBC-ENTMCNC: 32.4 GM/DL — SIGNIFICANT CHANGE UP (ref 32–36)
MCV RBC AUTO: 99.4 FL — SIGNIFICANT CHANGE UP (ref 80–100)
MONOCYTES # BLD AUTO: 0.8 K/UL — SIGNIFICANT CHANGE UP (ref 0–0.9)
MONOCYTES NFR BLD AUTO: 10.4 % — SIGNIFICANT CHANGE UP (ref 2–14)
NEUTROPHILS # BLD AUTO: 5.24 K/UL — SIGNIFICANT CHANGE UP (ref 1.8–7.4)
NEUTROPHILS NFR BLD AUTO: 68 % — SIGNIFICANT CHANGE UP (ref 43–77)
NITRITE UR-MCNC: NEGATIVE — SIGNIFICANT CHANGE UP
NRBC # BLD: 0 /100 WBCS — SIGNIFICANT CHANGE UP (ref 0–0)
NRBC # FLD: 0 K/UL — SIGNIFICANT CHANGE UP (ref 0–0)
PH UR: 6.5 — SIGNIFICANT CHANGE UP (ref 5–8)
PLATELET # BLD AUTO: 203 K/UL — SIGNIFICANT CHANGE UP (ref 150–400)
POTASSIUM SERPL-MCNC: 4.1 MMOL/L — SIGNIFICANT CHANGE UP (ref 3.5–5.3)
POTASSIUM SERPL-SCNC: 4.1 MMOL/L — SIGNIFICANT CHANGE UP (ref 3.5–5.3)
PROT SERPL-MCNC: 6.6 G/DL — SIGNIFICANT CHANGE UP (ref 6–8.3)
PROT UR-MCNC: SIGNIFICANT CHANGE UP MG/DL
RBC # BLD: 3.26 M/UL — LOW (ref 3.8–5.2)
RBC # FLD: 13.4 % — SIGNIFICANT CHANGE UP (ref 10.3–14.5)
SODIUM SERPL-SCNC: 141 MMOL/L — SIGNIFICANT CHANGE UP (ref 135–145)
SP GR SPEC: 1.02 — SIGNIFICANT CHANGE UP (ref 1–1.03)
UROBILINOGEN FLD QL: 1 MG/DL — SIGNIFICANT CHANGE UP (ref 0.2–1)
WBC # BLD: 7.7 K/UL — SIGNIFICANT CHANGE UP (ref 3.8–10.5)
WBC # FLD AUTO: 7.7 K/UL — SIGNIFICANT CHANGE UP (ref 3.8–10.5)

## 2024-09-02 RX ORDER — LORAZEPAM 4 MG/ML
1 INJECTION INTRAMUSCULAR; INTRAVENOUS ONCE
Refills: 0 | Status: DISCONTINUED | OUTPATIENT
Start: 2024-09-02 | End: 2024-09-02

## 2024-09-02 RX ORDER — CEFPODOXIME PROXETIL 100 MG/5ML
1 GRANULE, FOR SUSPENSION ORAL
Qty: 14 | Refills: 0
Start: 2024-09-02 | End: 2024-09-08

## 2024-09-02 RX ORDER — HALOPERIDOL 1 MG
2.5 TABLET ORAL ONCE
Refills: 0 | Status: COMPLETED | OUTPATIENT
Start: 2024-09-02 | End: 2024-09-02

## 2024-09-02 RX ORDER — ALPRAZOLAM 0.25 MG
0.25 TABLET ORAL ONCE
Refills: 0 | Status: DISCONTINUED | OUTPATIENT
Start: 2024-09-02 | End: 2024-09-02

## 2024-09-02 RX ADMIN — LORAZEPAM 1 MILLIGRAM(S): 4 INJECTION INTRAMUSCULAR; INTRAVENOUS at 16:58

## 2024-09-02 RX ADMIN — LORAZEPAM 1 MILLIGRAM(S): 4 INJECTION INTRAMUSCULAR; INTRAVENOUS at 19:11

## 2024-09-02 RX ADMIN — Medication 100 MILLIGRAM(S): at 16:07

## 2024-09-02 RX ADMIN — Medication 2.5 MILLIGRAM(S): at 16:58

## 2024-09-02 RX ADMIN — Medication 2.5 MILLIGRAM(S): at 19:11

## 2024-09-02 NOTE — H&P ADULT - TIME BILLING
I had a face to face encounter with this patient. I spent 75 total minutes on the bedside interview and examination, coordination of care, counseling, chart review, order placement and documentation for this patient.

## 2024-09-02 NOTE — ED ADULT NURSE REASSESSMENT NOTE - NS ED NURSE REASSESS COMMENT FT1
Received patient from [facility] s/p break. Patient is agitated, climbing out of bed, walking in hallway, hitting staff and not redirect. Called security at bedside to assist with de-escalation. Patient to be medicated as per MAR, as per Dr. Belcher. Patient placed back into bed, safety maintained ,will continue to monitor patient

## 2024-09-02 NOTE — H&P ADULT - NSHPREVIEWOFSYSTEMS_GEN_ALL_CORE
REVIEW OF SYSTEMS:    CONSTITUTIONAL: No weakness, fevers or chills  EYES/ENT: No visual changes; No dysphagia; No sore throat; No rhinorrhea; No sinus pain/pressure  NECK: No pain or stiffness  RESPIRATORY: No cough, wheezing, hemoptysis; No shortness of breath  CARDIOVASCULAR: No chest pain or palpitations; No lower extremity edema  GASTROINTESTINAL: No abdominal or epigastric pain. No nausea, vomiting, or hematemesis; No diarrhea or constipation. No melena or hematochezia.  GENITOURINARY: No dysuria, frequency or hematuria  NEUROLOGICAL: No numbness, paresthesias, or weakness; No HA; No LH/dizziness  MSK: ambulates with  SKIN: No itching, burning, rashes, or lesions   All other review of systems is negative unless indicated above.

## 2024-09-02 NOTE — ED PROVIDER NOTE - CARE PLAN
1 Principal Discharge DX:	Acute UTI   Principal Discharge DX:	Acute UTI  Secondary Diagnosis:	COVID-19  Secondary Diagnosis:	Agitation

## 2024-09-02 NOTE — H&P ADULT - PROBLEM SELECTOR PLAN 4
EF 53% in August  - c/w toprol XL  - lasix and entresto discontinued in recent hospitalization for hypotension AAOX1 at baseline  - c/w home seroquel, xanax, sertraline, trazodone  - lives in [place] memory unit

## 2024-09-02 NOTE — ED ADULT NURSE REASSESSMENT NOTE - NS ED NURSE REASSESS COMMENT FT1
EMS came to bring patient back to [facility]F, patient was placed on stretcher and became agitated, kicking and hitting staff, and confused. Patient speaking irrationally and is not easily directed. MD at bedside to witness patient behavior. Patient is deemed unsafe to herself and others, patient was placed back into ED stretcher and will be medicated as per MAR.

## 2024-09-02 NOTE — ED ADULT NURSE NOTE - NSFALLHARMRISKINTERV_ED_ALL_ED
Assistance OOB with selected safe patient handling equipment if applicable/Assistance with ambulation/Communicate risk of Fall with Harm to all staff, patient, and family/Monitor gait and stability/Provide visual cue: red socks, yellow wristband, yellow gown, etc/Reinforce activity limits and safety measures with patient and family/Bed in lowest position, wheels locked, appropriate side rails in place/Call bell, personal items and telephone in reach/Instruct patient to call for assistance before getting out of bed/chair/stretcher/Non-slip footwear applied when patient is off stretcher/[place] to call system/Physically safe environment - no spills, clutter or unnecessary equipment/Purposeful Proactive Rounding/Room/bathroom lighting operational, light cord in reach

## 2024-09-02 NOTE — H&P ADULT - PROBLEM SELECTOR PLAN 3
AAOX1 at baseline, normally can walk with walker  - c/w home seroquel, xanax, sertraline, trazodone  - lives in [place] memory unit AAOX1 at baseline  - c/w home seroquel, xanax, sertraline, trazodone  - lives in [place] memory unit - c/w statin, zetia

## 2024-09-02 NOTE — ED PROVIDER NOTE - PROGRESS NOTE DETAILS
I spoke with the nursing home who informed me that the reason the patient was sent back was because she had a IV in her arm upon discharge last night and they are not able to remove it.  They also informed me they are having trouble getting the antibiotics for the UTI.  Patient will be given ceftriaxone in the ED and prescribed cefpodoxime for 7 days outpatient.  Patient is safe for discharge.    Zac Cardona MD (PGY 2) I spoke with the patient's nursing home who stated they received the medication and the patient is good for discharge.    Zac Cardona MD (PGY 2) Prior to discharge, patient became acutely agitated not knowing why she was here.  Patient began leaving room, yelling at staff, and striking at staff members.  Patient was given 2.5 of Haldol and 1 lorazepam.    Zac Cardona MD (PGY 2) Ye MCGHEE: Patient was signed out pending transport. During this time, patient became very agitated, not redirectable, not responding to verbal de-escalation. Patient has no insight into where she is or why. She was given haldol and ativan which helped calm her. However upon transport arriving, patient is screaming, hitting staff, does not understand that she is being discharged. Patient is not safe for transport in this condition with repeated agitated behavior. Plan for repeat haldol and ativan. I spoke with the hospitalist who agreed to admit the patient for worsening dementia in the setting of UTI/COVID infection.    Zac Cardona MD (PGY 2) Ye MCGHEE: Patient is daughter Liza called for an update on her mother and 2 prior while she was and sent back to the [place].  [place] was informed but patient's daughter states that no family was informed.  I had a lengthy discussion with her describing the situation in which patient was agitated and requiring medication.  I explained to her that all attempts were made to send patient back to the facility however given that patient was requiring repeat medication for agitation and pending transport team, it became an unsafe situation.  Patient's daughter requested that it be investigated as to why she was sent home with an IV, starting off the events of her return to the hospital and eventual admission.  She expressed her dissatisfaction with the hospital.  I apologize for patient's circumstances and daughter expressed her understanding at the situation in which patient needs to be admitted but was also unhappy.

## 2024-09-02 NOTE — H&P ADULT - PROBLEM SELECTOR PLAN 6
- c/w levothy - c/w synthroid - xarelto held last admission after discussion w/ daughter re: high risks of falls and bleed  - c/w toprol XL

## 2024-09-02 NOTE — ED ADULT NURSE NOTE - OBJECTIVE STATEMENT
Patient presented to ED from [facility] with a chief complaint of increased agitation at the facility after being discharged earlier today. Patient is non-verbal at this time, however compliant with care. Patient noted to be COVID (+) yesterday, placed on precautions. Patient is A/O x 4, NAD, (+) bruising to right cheek and right thigh, PERRLA, speech clear, neurologically intact with no deficits, strength b/l upper and lower extremities 5/5, sensation intact b/l upper and lower extremities, rhythm irregular, A. Fib on CM  S1 and S2 heard with no murmurs radial and pedal pulses present, capillary refill <3 , lungs clear b/l lobes throughout with no adventitious sounds or accessory muscle use, even and unlabored, abdomen soft and non-tender, bowel sounds heard x 4 quadrant, no edema noted. Safety maintained, bed in lowest position, call bell within reach, plan of care reviewed with patient , addressed all questions and concern, will continue to monitor patient.

## 2024-09-02 NOTE — ED ADULT TRIAGE NOTE - CHIEF COMPLAINT QUOTE
Pt was d/c from hospital yesterday for AMS.  Pt was sent home with IV access in right arm.  Pt sent back for increased agitation. Hx: AFib, CHf, HTN, hyperlipidemia, hypothyroidism

## 2024-09-02 NOTE — PROVIDER CONTACT NOTE (OTHER) - BACKGROUND
Per provider, Dr Zac Cardona, patient is cleared and is able to return to their previous residence, [facility].

## 2024-09-02 NOTE — H&P ADULT - ASSESSMENT
89F PMHx demenita (AOx1), HTN, HLD, CHF, AS s/p TAVR, pAF on xarelto, hypothyroidism who presents for agitation. 89F PMHx demenita (AOx1), HLD, CHF, AS s/p TAVR, pAF on xarelto, hypothyroidism who presents for agitation, found to be Covid+.

## 2024-09-02 NOTE — H&P ADULT - PROBLEM SELECTOR PLAN 7
Diet: Easy to chew  DVT: SCDs  Code: Diet: Easy to chew  DVT: SCDs  Code: DNR/DNI (trial of NIV) - c/w synthroid

## 2024-09-02 NOTE — PROVIDER CONTACT NOTE (OTHER) - ASSESSMENT
has spoken to Maria C Laureano, , P: [phone]. She requested attestation form to be completed for return to facility. Dr MADISON Frank completed form. SHANA emailed completed form to [email or web address]. Patient is cleared to return to facility.  confirmed that patient's mode of transportation is ambulance. Clinical provider is in agreement with ambulance back to assisted living facility.

## 2024-09-02 NOTE — H&P ADULT - NSHPLABSRESULTS_GEN_ALL_CORE
10.5   7.70  )-----------( 203      ( 02 Sep 2024 14:15 )             32.4     141  |  108<H>  |  18  ----------------------------<  77       4.1   |  21<L>  |  1.04    Ca    9.3      02 Sep 2024 14:15    TPro  6.6  /  Alb  3.5  /  TBili  0.5  /  DBili  x   /  AST  22  /  ALT  14  /  AlkPhos  89          14:15 - VBG - pH: 7.35  | pCO2: 45    | pO2: 44    | Lactate: 1.6          Urinalysis Basic - ( 02 Sep 2024 14:15 )  Color: Yellow / Appearance: Clear / S.023 / pH: 6.5  Gluc: Negative mg/dL / Ketone: Negative mg/dL  / Bili: Negative / Urobili: 1.0 mg/dL   Blood: Negative / Protein: Trace mg/dL / Nitrite: Negative   Leuk Esterase: Negative / RBC: x / WBC x   Sq Epi: x / Bacteria: x  Hyaline Casts: x/WBC Casts: x          Urinalysis with Rflx Culture (collected 02 Sep 2024 14:15)    Urinalysis with Rflx Culture (collected 02 Sep 2024 01:05)    CXR - 10.5   7.70  )-----------( 203      ( 02 Sep 2024 14:15 )             32.4     141  |  108<H>  |  18  ----------------------------<  77       4.1   |  21<L>  |  1.04    Ca    9.3      02 Sep 2024 14:15    TPro  6.6  /  Alb  3.5  /  TBili  0.5  /  DBili  x   /  AST  22  /  ALT  14  /  AlkPhos  89          14:15 - VBG - pH: 7.35  | pCO2: 45    | pO2: 44    | Lactate: 1.6          Urinalysis Basic - ( 02 Sep 2024 14:15 )  Color: Yellow / Appearance: Clear / S.023 / pH: 6.5  Gluc: Negative mg/dL / Ketone: Negative mg/dL  / Bili: Negative / Urobili: 1.0 mg/dL   Blood: Negative / Protein: Trace mg/dL / Nitrite: Negative   Leuk Esterase: Negative / RBC: x / WBC x   Sq Epi: x / Bacteria: x  Hyaline Casts: x/WBC Casts: x          Urinalysis with Rflx Culture (collected 02 Sep 2024 14:15)    Urinalysis with Rflx Culture (collected 02 Sep 2024 01:05)    CXR - Clear lungs    CTH:  IMPRESSION:  No CT evidence of acute intracranial pathology.    Air-fluid levels in the left maxillary sinus and both sphenoid sinuses,   which may represent trapped secretions versus sinusitis.    A nonaggressive appearing 1.2 cm lytic focus in the left parietal   calvarium is stable from 2024. 10.5   7.70  )-----------( 203      ( 02 Sep 2024 14:15 )             32.4     141  |  108<H>  |  18  ----------------------------<  77       4.1   |  21<L>  |  1.04    Ca    9.3      02 Sep 2024 14:15    TPro  6.6  /  Alb  3.5  /  TBili  0.5  /  DBili  x   /  AST  22  /  ALT  14  /  AlkPhos  89          14:15 - VBG - pH: 7.35  | pCO2: 45    | pO2: 44    | Lactate: 1.6          Urinalysis Basic - ( 02 Sep 2024 14:15 )  Color: Yellow / Appearance: Clear / S.023 / pH: 6.5  Gluc: Negative mg/dL / Ketone: Negative mg/dL  / Bili: Negative / Urobili: 1.0 mg/dL   Blood: Negative / Protein: Trace mg/dL / Nitrite: Negative   Leuk Esterase: Negative / RBC: x / WBC x   Sq Epi: x / Bacteria: x  Hyaline Casts: x/WBC Casts: x          Urinalysis with Rflx Culture (collected 02 Sep 2024 14:15)    Urinalysis with Rflx Culture (collected 02 Sep 2024 01:05)    CXR - Clear lungs    CTH:  IMPRESSION:  No CT evidence of acute intracranial pathology.    Air-fluid levels in the left maxillary sinus and both sphenoid sinuses,   which may represent trapped secretions versus sinusitis.    A nonaggressive appearing 1.2 cm lytic focus in the left parietal   calvarium is stable from 2024.    EKG: pending

## 2024-09-02 NOTE — ED PROVIDER NOTE - PATIENT PORTAL LINK FT
You can access the FollowMyHealth Patient Portal offered by [facility] by registering at the following website: [email or web address]. By joining [employer]’s FollowMyHealth portal, you will also be able to view your health information using other applications (apps) compatible with our system.

## 2024-09-02 NOTE — H&P ADULT - CONVERSATION DETAILS
Discussed with daughter (HCP) Violeta patient's code status.  Reported that patient has living will on file with [facility], however not available her on my review of patient chart at this time.  Confirmed with daughter that in the event of cardiopulmonary arrest, it is her wish to not perform CPR in attempt to resuscitate patient.  Also stated that she does not want intubation in the event of compromise necessitating such measures to preserve life. Patient to be made DNR/DNI, with trial of non-invasive ventilation.

## 2024-09-02 NOTE — ED PROVIDER NOTE - CLINICAL SUMMARY MEDICAL DECISION MAKING FREE TEXT BOX
89-year-old female recently seen yesterday for altered mental status and agitation diagnosed with COVID presenting due to recurrent agitation.  Patient seems more lethargic normal and more agitated.  Patient is poor historian and does not answer questions.  Lungs clear to auscultation bilaterally.  Patient afebrile in the ED.  Patient does appear lethargic on exam, but is awake and answers some questions.  AO x 1.  Will order CBC, CMP, urinalysis to evaluate for any signs of infections or electrolyte abnormalities.

## 2024-09-02 NOTE — ED PROVIDER NOTE - NSFOLLOWUPINSTRUCTIONS_ED_ALL_ED_FT
Urinary Tract Infection    A urinary tract infection (UTI) is an infection of any part of the urinary tract, which includes the kidneys, ureters, bladder, and urethra. Risk factors include ignoring your need to urinate, wiping back to front if female, being an uncircumcised male, and having diabetes or a weak immune system. Symptoms include frequent urination, pain or burning with urination, foul smelling urine, cloudy urine, pain in the lower abdomen, blood in the urine, and fever. If you were prescribed an antibiotic medicine, take it as told by your health care provider. Do not stop taking the antibiotic even if you start to feel better.    SEEK IMMEDIATE MEDICAL CARE IF YOU HAVE ANY OF THE FOLLOWING SYMPTOMS: severe back or abdominal pain, fever, inability to keep fluids or medicine down, dizziness/lightheadedness, or a change in mental status.    Please take the cefpodoxime 200mg BID for the next 7 days.

## 2024-09-02 NOTE — H&P ADULT - PROBLEM SELECTOR PLAN 5
- xarelto held last admission after discussion w/ daughter re: high risks of falls and bleed  - c/w toprol XL EF 53% in August  - c/w toprol XL  - lasix and entresto discontinued in recent hospitalization for hypotension

## 2024-09-02 NOTE — H&P ADULT - PROBLEM SELECTOR PLAN 1
Likely iso of active covid infxn and poor substrate given baseline dementia.  - c/w constant obs for now  - delirium precautions Likely iso of active covid infxn and poor substrate given baseline dementia.  - c/w constant obs for now  - delirium precautions  - defer further abx Likely iso of active covid infxn and poor substrate given baseline dementia.  - c/w constant obs for now  - delirium precautions  - defer covid tx, patient on RA, without significant signs of respiratory compromise  - defer further abx for UTI, as UA without strong evidence for this  - reassess mental status in AM Likely iso of active covid infxn and poor substrate given baseline dementia.  On room air, no signs of respiratory distress.  - c/w constant obs for now  - delirium precautions  - defer covid tx for now  - defer further abx for UTI, as UA without strong evidence for this  - reassess mental status in AM Likely iso of active covid infxn and poor substrate given baseline dementia.    - c/w constant obs for now  - delirium precautions  - defer further abx for UTI, as UA without strong evidence for this  - reassess mental status in AM

## 2024-09-02 NOTE — ED PROVIDER NOTE - NSICDXPASTMEDICALHX_GEN_ALL_CORE_FT
Patient discharged with hospice at this time. PICC line removed per request of hospice RN. Patient taken home by EMS transport. PAST MEDICAL HISTORY:  Aortic stenosis     Chronic combined systolic and diastolic heart failure     Chronic mood disorder     Cognitive decline     Hyperlipidemia     Hypertension     Hypothyroidism     Paroxysmal atrial fibrillation     Recurrent UTI

## 2024-09-02 NOTE — H&P ADULT - NSHPPHYSICALEXAM_GEN_ALL_CORE
PHYSICAL EXAM:  GENERAL: NAD  HEAD:  Atraumatic, Normocephalic  EYES: EOMI, PERRL, conjunctiva and sclera clear  NECK: Supple, No JVD  CHEST/LUNG: Clear to auscultation bilaterally; No wheezes, rales or rhonchi; normal work of breathing, speaking in full sentences  HEART: Regular rate and rhythm; No murmurs, rubs, or gallops, (+)S1, S2  ABDOMEN: Soft, Nontender, Nondistended; Normal Bowel sounds   EXTREMITIES:  2+ Peripheral Pulses, No clubbing, cyanosis, or edema  PSYCH: normal mood and affect, A&Ox3  NEUROLOGY: no focal neuro deficits  SKIN: No rashes or lesions PHYSICAL EXAM:  - limited as below, given patient's level of   GENERAL: NAD  HEAD:  Atraumatic, Normocephalic  CHEST/LUNG: Breathing comfortably on RA  HEART: Regular rate and rhythm; No murmurs, rubs, or gallops, (+)S1, S2  ABDOMEN: Soft, Nontender, Nondistended   EXTREMITIES:  No clubbing, cyanosis, or edema  NEUROLOGY: somnolent  SKIN: No rashes or lesions PHYSICAL EXAM:  - limited as below, given patient's level of sedation post haldol and ativan  GENERAL: elderly woman, sleeping comfortably in NAD  HEAD:  Atraumatic, Normocephalic  CHEST/LUNG: Breathing comfortably on RA  HEART: Regular rate  ABDOMEN: Soft, Nontender, Nondistended   EXTREMITIES:  No clubbing, cyanosis, or edema  NEUROLOGY: somnolent  SKIN: No rashes or lesions

## 2024-09-02 NOTE — ED ADULT NURSE REASSESSMENT NOTE - NS ED NURSE REASSESS COMMENT FT1
Report received from day MANUELA Ignacio. Pt with no acute changes at this time. Pt remains on 1:1 for agitation. Pt admitted to medicine for UTI and agitation. Pt respirations even and unlabored, chest rise and fall equal b/l. Pt with no acute distress. Stretcher in lowest position, PCA at bedside, pt safety maintained. Report received from day MANUELA Ignacio. Pt A&ox1, on room air, NSR on cardiac monitor. Pt with no acute changes at this time. Pt remains on 1:1 for agitation. Pt admitted to medicine for UTI and agitation. Pt respirations even and unlabored, chest rise and fall equal b/l. Pt with no acute distress. Stretcher in lowest position, PCA at bedside, pt safety maintained.

## 2024-09-02 NOTE — H&P ADULT - HISTORY OF PRESENT ILLNESS
89F PMHx dementia (AOx1), HTN, HLD, CHF, AS s/p TAVR, pAF on xarelto, hypothyroidism who presents for agitation.      Was initially seen in ED yesterday for agitation and worsening mental status, found to be Covid + and also with UTI.  Was to be discharged back to facility ([facility]) on 7 day course of cefpodoxime but became acutely more agitated and altered, screaming, hitting staff and received haldol and ativan x2.   89F PMHx dementia (AOx1), HLD, CHF, AS s/p TAVR, pAF on xarelto, hypothyroidism who presents for agitation.      Was initially seen in ED yesterday for agitation and worsening mental status, found to be Covid + and also treated for UTI.  Was to be discharged back to facility ([facility]) on 7 day course of cefpodoxime.  There, was found to have IV still in place and sent back to [facility] for removal, as reportedly could not be removed there.  IV removed here in ED and was planned for discharge back to facility but became acutely more agitated, screaming, hitting staff and received haldol and ativan x2.  Also given CTX x1.  Placed on 1:1 and admitted to medicine.

## 2024-09-02 NOTE — ED ADULT NURSE REASSESSMENT NOTE - NS ED NURSE REASSESS COMMENT FT1
Late note:  IV removed from right forearm.  Bleeding controlled.  Catheter intact.  Pt skin around IV site without s/s of inflammation.

## 2024-09-02 NOTE — ED ADULT NURSE REASSESSMENT NOTE - NS ED NURSE REASSESS COMMENT FT1
Break Coverage RN: Pt Awake and alert, respirations equal and unlabored. Pt resting in stretcher at this time, offers no complaints. A fib on cardiac monitor. VS performed. IV patent. Medicated as per EMR orders. Plan to discharge back to facility. Pending discharge. No acute distress noted. Safety maintained, bed in lowest position, side rails raised, call bell in reach.

## 2024-09-03 ENCOUNTER — TRANSCRIPTION ENCOUNTER (OUTPATIENT)
Age: 89
End: 2024-09-03

## 2024-09-03 VITALS
SYSTOLIC BLOOD PRESSURE: 145 MMHG | DIASTOLIC BLOOD PRESSURE: 92 MMHG | HEART RATE: 87 BPM | RESPIRATION RATE: 20 BRPM | OXYGEN SATURATION: 97 %

## 2024-09-03 DIAGNOSIS — U07.1 COVID-19: ICD-10-CM

## 2024-09-03 LAB
ANION GAP SERPL CALC-SCNC: 16 MMOL/L — HIGH (ref 7–14)
BASOPHILS # BLD AUTO: 0.07 K/UL — SIGNIFICANT CHANGE UP (ref 0–0.2)
BASOPHILS NFR BLD AUTO: 0.8 % — SIGNIFICANT CHANGE UP (ref 0–2)
BUN SERPL-MCNC: 18 MG/DL — SIGNIFICANT CHANGE UP (ref 7–23)
CALCIUM SERPL-MCNC: 7.1 MG/DL — LOW (ref 8.4–10.5)
CHLORIDE SERPL-SCNC: 105 MMOL/L — SIGNIFICANT CHANGE UP (ref 98–107)
CO2 SERPL-SCNC: 20 MMOL/L — LOW (ref 22–31)
CREAT SERPL-MCNC: 0.9 MG/DL — SIGNIFICANT CHANGE UP (ref 0.5–1.3)
EGFR: 61 ML/MIN/1.73M2 — SIGNIFICANT CHANGE UP
EOSINOPHIL # BLD AUTO: 0.33 K/UL — SIGNIFICANT CHANGE UP (ref 0–0.5)
EOSINOPHIL NFR BLD AUTO: 3.8 % — SIGNIFICANT CHANGE UP (ref 0–6)
GLUCOSE SERPL-MCNC: 74 MG/DL — SIGNIFICANT CHANGE UP (ref 70–99)
HCT VFR BLD CALC: 34.6 % — SIGNIFICANT CHANGE UP (ref 34.5–45)
HGB BLD-MCNC: 11.2 G/DL — LOW (ref 11.5–15.5)
IANC: 5.26 K/UL — SIGNIFICANT CHANGE UP (ref 1.8–7.4)
IMM GRANULOCYTES NFR BLD AUTO: 0.5 % — SIGNIFICANT CHANGE UP (ref 0–0.9)
LYMPHOCYTES # BLD AUTO: 2.03 K/UL — SIGNIFICANT CHANGE UP (ref 1–3.3)
LYMPHOCYTES # BLD AUTO: 23.6 % — SIGNIFICANT CHANGE UP (ref 13–44)
MAGNESIUM SERPL-MCNC: 2.1 MG/DL — SIGNIFICANT CHANGE UP (ref 1.6–2.6)
MCHC RBC-ENTMCNC: 31.7 PG — SIGNIFICANT CHANGE UP (ref 27–34)
MCHC RBC-ENTMCNC: 32.4 GM/DL — SIGNIFICANT CHANGE UP (ref 32–36)
MCV RBC AUTO: 98 FL — SIGNIFICANT CHANGE UP (ref 80–100)
MONOCYTES # BLD AUTO: 0.87 K/UL — SIGNIFICANT CHANGE UP (ref 0–0.9)
MONOCYTES NFR BLD AUTO: 10.1 % — SIGNIFICANT CHANGE UP (ref 2–14)
NEUTROPHILS # BLD AUTO: 5.26 K/UL — SIGNIFICANT CHANGE UP (ref 1.8–7.4)
NEUTROPHILS NFR BLD AUTO: 61.2 % — SIGNIFICANT CHANGE UP (ref 43–77)
NRBC # BLD: 0 /100 WBCS — SIGNIFICANT CHANGE UP (ref 0–0)
NRBC # FLD: 0 K/UL — SIGNIFICANT CHANGE UP (ref 0–0)
PHOSPHATE SERPL-MCNC: 3.1 MG/DL — SIGNIFICANT CHANGE UP (ref 2.5–4.5)
PLATELET # BLD AUTO: 218 K/UL — SIGNIFICANT CHANGE UP (ref 150–400)
POTASSIUM SERPL-MCNC: 4.4 MMOL/L — SIGNIFICANT CHANGE UP (ref 3.5–5.3)
POTASSIUM SERPL-SCNC: 4.4 MMOL/L — SIGNIFICANT CHANGE UP (ref 3.5–5.3)
RBC # BLD: 3.53 M/UL — LOW (ref 3.8–5.2)
RBC # FLD: 13.8 % — SIGNIFICANT CHANGE UP (ref 10.3–14.5)
SODIUM SERPL-SCNC: 141 MMOL/L — SIGNIFICANT CHANGE UP (ref 135–145)
WBC # BLD: 8.6 K/UL — SIGNIFICANT CHANGE UP (ref 3.8–10.5)
WBC # FLD AUTO: 8.6 K/UL — SIGNIFICANT CHANGE UP (ref 3.8–10.5)

## 2024-09-03 RX ORDER — SERTRALINE HYDROCHLORIDE 50 MG/1
1.5 TABLET, FILM COATED ORAL
Refills: 0 | DISCHARGE

## 2024-09-03 RX ORDER — ACETAMINOPHEN 325 MG/1
650 TABLET ORAL EVERY 6 HOURS
Refills: 0 | Status: DISCONTINUED | OUTPATIENT
Start: 2024-09-03 | End: 2024-09-03

## 2024-09-03 RX ORDER — METOPROLOL TARTRATE 100 MG/1
50 TABLET ORAL DAILY
Refills: 0 | Status: DISCONTINUED | OUTPATIENT
Start: 2024-09-03 | End: 2024-09-03

## 2024-09-03 RX ORDER — EZETIMIBE 10 MG/1
10 TABLET ORAL DAILY
Refills: 0 | Status: DISCONTINUED | OUTPATIENT
Start: 2024-09-03 | End: 2024-09-03

## 2024-09-03 RX ORDER — ALPRAZOLAM 0.25 MG
0.25 TABLET ORAL DAILY
Refills: 0 | Status: DISCONTINUED | OUTPATIENT
Start: 2024-09-03 | End: 2024-09-03

## 2024-09-03 RX ORDER — FOLIC ACID/MULTIVIT,IRON,MINER 0.4MG-18MG
1 TABLET,CHEWABLE ORAL
Refills: 0 | DISCHARGE

## 2024-09-03 RX ORDER — SERTRALINE HYDROCHLORIDE 50 MG/1
150 TABLET, FILM COATED ORAL DAILY
Refills: 0 | Status: DISCONTINUED | OUTPATIENT
Start: 2024-09-03 | End: 2024-09-03

## 2024-09-03 RX ORDER — LEVOTHYROXINE SODIUM 100 MCG
88 TABLET ORAL DAILY
Refills: 0 | Status: DISCONTINUED | OUTPATIENT
Start: 2024-09-03 | End: 2024-09-03

## 2024-09-03 RX ORDER — TRAZODONE HCL 50 MG
150 TABLET ORAL DAILY
Refills: 0 | Status: DISCONTINUED | OUTPATIENT
Start: 2024-09-03 | End: 2024-09-03

## 2024-09-03 RX ADMIN — Medication 88 MICROGRAM(S): at 10:55

## 2024-09-03 RX ADMIN — METOPROLOL TARTRATE 50 MILLIGRAM(S): 100 TABLET ORAL at 10:55

## 2024-09-03 RX ADMIN — Medication 12.5 MILLIGRAM(S): at 13:26

## 2024-09-03 RX ADMIN — SERTRALINE HYDROCHLORIDE 150 MILLIGRAM(S): 50 TABLET, FILM COATED ORAL at 13:24

## 2024-09-03 RX ADMIN — Medication 100 MILLIGRAM(S): at 14:19

## 2024-09-03 RX ADMIN — Medication 0.25 MILLIGRAM(S): at 13:25

## 2024-09-03 NOTE — DISCHARGE NOTE PROVIDER - HOSPITAL COURSE
89F PMHx demenita (AOx1), HLD, CHF, AS s/p TAVR, pAF on xarelto, hypothyroidism who presents for agitation, found to have possible UTI. Patient on admission noted with CT head without any acute findings, possible component of sinusitis. Also with very weakly positive UA most likely with resolving infection (patient given abx prior to arrival). Placed on empiric Ceftriaxone in the hospital. Patient planned for 2 doses from 9/2-9/3. Patient otherwise with prescription for cefpodoxime, already sent to facility recently. Can complete empiric 5 additional days of antibiotic therapy to complete course to ensure resolution of suspect infection. Patient also seen by , recommended to be continued on her current medication regimen and otherwise psychiatrically stable to return to her facility. Patient without any further behavioral issues and did not require any further PRNs during admission. Patient at this time is medically and psychiatrically optimized to return to her facility.

## 2024-09-03 NOTE — DISCHARGE NOTE NURSING/CASE MANAGEMENT/SOCIAL WORK - PATIENT PORTAL LINK FT
You can access the FollowMyHealth Patient Portal offered by [facility] by registering at the following website: [email or web address]. By joining [employer]’s FollowMyHealth portal, you will also be able to view your health information using other applications (apps) compatible with our system.

## 2024-09-03 NOTE — ED ADULT NURSE REASSESSMENT NOTE - NS ED NURSE REASSESS COMMENT FT1
Pt with no acute changes at this time. VSS. Pt home aide at bedside. Pt respirations even and unlabored, chest rise and fall equal b/l. Pt with no acute distress. Stretcher in lowest position, call bell within reach, pt safety maintained.

## 2024-09-03 NOTE — CHART NOTE - NSCHARTNOTEFT_GEN_A_CORE
covering patient in the ED for discharge planning. ACP reported patient can be discharged back to [facility] today. Patient is from [facility], [phone].  contacted [facility] and spoke to Killian (Dir. Of Wellness) and informed her patient will return today. Killian asked that the [facility] Short Form be completed and faxed to them for review prior to patient return, fax # [phone].       informed ACP of above and the short form was left in patient’s chart for her to complete.  to follow up and fax form when it’s completed.

## 2024-09-03 NOTE — DISCHARGE NOTE PROVIDER - CARE PROVIDER_API CALL
MARCO A BERRY  [address] [place] [id number]  Phone: [phone]  Fax: [phone]  Established Patient  Follow Up Time: Routine

## 2024-09-03 NOTE — BH CONSULTATION LIAISON ASSESSMENT NOTE - MSE UNSTRUCTURED FT
On exam, pt is with aid. AA O x 1 (self), unaware of where she is or why. Pleasant, disorganized, no insight. [place]. Affect appropriate. Becomes disorganized talking about taking care of her mother. Aid says she is at her baseline. Pt denies subjective experiences of depression, anxiety/panic, paranoia, safety concerns, SI, HI, or AVH.

## 2024-09-03 NOTE — ED ADULT NURSE REASSESSMENT NOTE - NS ED NURSE REASSESS COMMENT FT1
Pt A+Ox2, Pt in NAD.  Pt awaiting bed placement, Lungs clear, equal and unlabored, abdomen soft, skin intact.  Cardiac monitor in place, fall precautions maintained.  Pt has private aide at bedside.  Will continue to monitor.

## 2024-09-03 NOTE — BH CONSULTATION LIAISON ASSESSMENT NOTE - NSSUICPROTFACT_PSY_ALL_CORE
Responsibility to children, family, or others/Identifies reasons for living/Fear of death or the actual act of killing self/Christianity beliefs

## 2024-09-03 NOTE — DISCHARGE NOTE NURSING/CASE MANAGEMENT/SOCIAL WORK - NSDCPEFALRISK_GEN_ALL_CORE
For information on Fall & Injury Prevention, visit: [email or web address] OR  [email or web address] OR  https://www.cdc.gov/steadi/patient.html

## 2024-09-03 NOTE — PROVIDER CONTACT NOTE (OTHER) - ACTION/TREATMENT ORDERED:
Provider made aware. NP to speak to [facility] regarding plan of care. Update endorsed to primary RN Hillary
Transportation coordinated via [facility] [phone] for 6:30pm  back to [facility] [place]. Trip# [id number].

## 2024-09-03 NOTE — BH CONSULTATION LIAISON ASSESSMENT NOTE - HPI (INCLUDE ILLNESS QUALITY, SEVERITY, DURATION, TIMING, CONTEXT, MODIFYING FACTORS, ASSOCIATED SIGNS AND SYMPTOMS)
89F PMHx dementia (AOx1), HLD, CHF, AS s/p TAVR, pAF on xarelto, hypothyroidism who presents for agitation. Psych consulted for agitation.     Chart reviewed. Per initial H&P: "Was initially seen in ED yesterday for agitation and worsening mental status, found to be Covid + and also treated for UTI.  Was to be discharged back to facility ([facility]) on 7 day course of cefpodoxime.  There, was found to have IV still in place and sent back to [facility] for removal, as reportedly could not be removed there.  IV removed here in ED and was planned for discharge back to facility but became acutely more agitated, screaming, hitting staff and received haldol and ativan x2.  Also given CTX x1.  Placed on 1:1 and admitted to medicine." Discussion held between  and ED and discussed wanting to get pt back to [facility] as daughter is very upset over a mistake involving her IV.     On exam, pt is with aid. AA O x 1 (self), unaware of where she is or why. Pleasant, disorganized, no insight. [place]. Affect appropriate. Becomes disorganized talking about taking care of her mother. Aid says she is at her baseline. Pt denies subjective experiences of depression, anxiety/panic, paranoia, safety concerns, SI, HI, or AVH.

## 2024-09-03 NOTE — DISCHARGE NOTE PROVIDER - NSDCFUADDAPPT_GEN_ALL_CORE_FT
Please continue to take your cefpodoxime (antibiotic) once daily for an additional 5 days to complete a full course of antibiotics.

## 2024-09-03 NOTE — DISCHARGE NOTE PROVIDER - ATTENDING DISCHARGE PHYSICAL EXAMINATION:
Patient seen and examined at bedside on day of discharge. Patient seen and examined at bedside on day of discharge. Patient seen with private aide at bedside. Patient noted to be resting comfortable. No further PRNs required overnight or this morning. Patient pleasant and conversant. States that she overall feels well. Would like to go home. Denies any headache, dizziness, CP, SOB, nausea or vomiting. Denies any fevers, dysuria or abdominal pain. VSS, afebrile. Exam notable for clear lungs, normal heart sounds and benign abdominal exam. Patient with stable labs, no leukocytosis. Tolerated 2 doses of IV CTX with plan to complete additional 5 days of cefpodoxime to complete empiric course of UTI (Prescription already sent to patient's facility last night by ED team). Patient also seen by  and no medication changes recommended. Patient is otherwise medically and psychiatrically optimized for return to her facility. Discharge planning discussed with patient's aide and patient's daughter Liza.

## 2024-09-03 NOTE — DISCHARGE NOTE PROVIDER - NSDCMRMEDTOKEN_GEN_ALL_CORE_FT
ascorbic acid 500 mg oral tablet: 1 tab(s) orally once a day  atorvastatin 40 mg oral tablet: 1 tab(s) orally once a day  cefpodoxime 200 mg oral tablet: 1 tab(s) orally 2 times a day  cholecalciferol 50 mcg (2000 intl units) oral tablet: 1 tab(s) orally once a day  ezetimibe 10 mg oral tablet: 1 tab(s) orally once a day  ferrous sulfate (as elemental iron) 45 mg oral tablet, extended release: 1 tab(s) orally once a day  levothyroxine 88 mcg (0.088 mg) oral tablet: 1 tab(s) orally once a day  melatonin 10 mg oral tablet: 1 tab(s) orally once a day  methenamine hippurate 1 g oral tablet: 1 tab(s) orally once a day  metoprolol succinate 50 mg oral tablet, extended release: 1 tab(s) orally once a day (in the evening) with dinner  Probiotic Formula (Bacillus Coagulans) oral capsule: 1 cap(s) orally once a day  QUEtiapine 25 mg oral tablet: 0.5 tab(s) orally once a day  sertraline 100 mg oral tablet: 1.5 tab(s) orally once a day  traZODone 150 mg oral tablet: 1 tab(s) orally once a day  Xanax 0.25 mg oral tablet: 1 tab(s) orally once a day

## 2024-09-03 NOTE — DISCHARGE NOTE PROVIDER - NSDCCPCAREPLAN_GEN_ALL_CORE_FT
PRINCIPAL DISCHARGE DIAGNOSIS  Diagnosis: Acute UTI  Assessment and Plan of Treatment: In the hospital you were found to have a urinalysis suspicious for a possible urinary tract infection. These infections can sometimes cause confusion and agitation. In the hospital you were given IV antibiotics for 2 days and your symptoms improved. You should continue to take your oral cefpodoxime (antibiotic) for an additional 5 days to complete a full 7 day course of antibiotics to ensure that your infection resolves. If you experience burning on urination, abdominal pain or fever, please contact your physician or return to the hospital.      SECONDARY DISCHARGE DIAGNOSES  Diagnosis: Agitation  Assessment and Plan of Treatment: Please continue to take all of your medications as prescribed. Our psychiatry evaluated you in the hospital and recommended that you continue with your current medication regimen.

## 2024-09-03 NOTE — ED ADULT NURSE REASSESSMENT NOTE - NS ED NURSE REASSESS COMMENT FT1
Pt with no acute changes at this time. VSS. Pt offered food, states she is not hungry at this time. Pt respirations even and unlabored, chest rise and fall equal b/l. Pt with no acute distress. Stretcher in lowest position, call bell within reach, pt safety maintained.

## 2024-09-03 NOTE — BH CONSULTATION LIAISON ASSESSMENT NOTE - NSBHCHARTREVIEWVS_PSY_A_CORE FT
Vital Signs Last 24 Hrs  T(C): 37 (03 Sep 2024 06:50), Max: 37.1 (03 Sep 2024 03:00)  T(F): 98.6 (03 Sep 2024 06:50), Max: 98.8 (03 Sep 2024 03:00)  HR: 81 (03 Sep 2024 06:50) (78 - 94)  BP: 124/85 (03 Sep 2024 06:50) (124/85 - 162/91)  BP(mean): --  RR: 16 (03 Sep 2024 06:50) (16 - 19)  SpO2: 95% (03 Sep 2024 06:50) (95% - 97%)    Parameters below as of 03 Sep 2024 06:50  Patient On (Oxygen Delivery Method): room air

## 2024-09-03 NOTE — BH CONSULTATION LIAISON ASSESSMENT NOTE - CURRENT MEDICATION
MEDICATIONS  (STANDING):  ALPRAZolam 0.25 milliGRAM(s) Oral daily  atorvastatin 40 milliGRAM(s) Oral at bedtime  cefTRIAXone   IVPB 1000 milliGRAM(s) IV Intermittent once  ezetimibe 10 milliGRAM(s) Oral daily  levothyroxine 88 MICROGram(s) Oral daily  metoprolol succinate ER 50 milliGRAM(s) Oral daily  QUEtiapine 12.5 milliGRAM(s) Oral daily  sertraline 150 milliGRAM(s) Oral daily  traZODone 150 milliGRAM(s) Oral daily    MEDICATIONS  (PRN):  acetaminophen     Tablet .. 650 milliGRAM(s) Oral every 6 hours PRN Temp greater or equal to 38C (100.4F), Mild Pain (1 - 3)  melatonin 3 milliGRAM(s) Oral at bedtime PRN Insomnia

## 2024-09-03 NOTE — BH CONSULTATION LIAISON ASSESSMENT NOTE - SUMMARY
89F PMHx dementia (AOx1), HLD, CHF, AS s/p TAVR, pAF on xarelto, hypothyroidism who presents for agitation. Psych consulted for agitation.   On exam, pt is at her baseline. Pt denies subjective experiences of depression, anxiety/panic, paranoia, safety concerns, SI, HI, or AVH. Psychiatrically cleared for return to NH    Plan:   - Continue home meds  - Routine observation  - Dispo: return to NH  We will sign off; please call us back as needed

## 2024-09-03 NOTE — DISCHARGE NOTE NURSING/CASE MANAGEMENT/SOCIAL WORK - NSDCFUADDAPPT_GEN_ALL_CORE_FT
Please continue to take your cefpodoxime (antibiotic) once daily for an additional 5 days to complete a full course of antibiotics. Principal Discharge DX:	Acute dyspnea  Secondary Diagnosis:	GERD (gastroesophageal reflux disease)  Secondary Diagnosis:	Reactive airway disease   1

## 2024-09-03 NOTE — ED ADULT NURSE REASSESSMENT NOTE - NS ED NURSE REASSESS COMMENT FT1
Pt remains A&ox1 to self, on room air. Personal aide at bedside. Right forearm 22g placed, +blood return, morning labs collected and sent. Pt turned and repositioned, skin intact. Pt safety maintained.

## 2024-09-09 ENCOUNTER — EMERGENCY (EMERGENCY)
Facility: HOSPITAL | Age: 89
LOS: 1 days | Discharge: HOME | End: 2024-09-09
Attending: EMERGENCY MEDICINE
Payer: MEDICARE

## 2024-09-09 VITALS
OXYGEN SATURATION: 98 % | SYSTOLIC BLOOD PRESSURE: 158 MMHG | DIASTOLIC BLOOD PRESSURE: 86 MMHG | HEART RATE: 85 BPM | TEMPERATURE: 99 F | HEIGHT: 57 IN | RESPIRATION RATE: 19 BRPM | WEIGHT: 98.11 LBS

## 2024-09-09 LAB
ALBUMIN SERPL ELPH-MCNC: 3.8 G/DL — SIGNIFICANT CHANGE UP (ref 3.3–5)
ALP SERPL-CCNC: 92 U/L — SIGNIFICANT CHANGE UP (ref 40–120)
ALT FLD-CCNC: 12 U/L — SIGNIFICANT CHANGE UP (ref 10–45)
ANION GAP SERPL CALC-SCNC: 10 MMOL/L — SIGNIFICANT CHANGE UP (ref 5–17)
APPEARANCE UR: CLEAR — SIGNIFICANT CHANGE UP
APTT BLD: 39.9 SEC — HIGH (ref 24.5–35.6)
AST SERPL-CCNC: 29 U/L — SIGNIFICANT CHANGE UP (ref 10–40)
BACTERIA # UR AUTO: NEGATIVE /HPF — SIGNIFICANT CHANGE UP
BASOPHILS # BLD AUTO: 0.05 K/UL — SIGNIFICANT CHANGE UP (ref 0–0.2)
BASOPHILS NFR BLD AUTO: 0.4 % — SIGNIFICANT CHANGE UP (ref 0–2)
BILIRUB SERPL-MCNC: 0.5 MG/DL — SIGNIFICANT CHANGE UP (ref 0.2–1.2)
BILIRUB UR-MCNC: NEGATIVE — SIGNIFICANT CHANGE UP
BUN SERPL-MCNC: 16 MG/DL — SIGNIFICANT CHANGE UP (ref 7–23)
CALCIUM SERPL-MCNC: 9.8 MG/DL — SIGNIFICANT CHANGE UP (ref 8.4–10.5)
CAST: 0 /LPF — SIGNIFICANT CHANGE UP (ref 0–4)
CHLORIDE SERPL-SCNC: 104 MMOL/L — SIGNIFICANT CHANGE UP (ref 96–108)
CO2 SERPL-SCNC: 24 MMOL/L — SIGNIFICANT CHANGE UP (ref 22–31)
COD CRY URNS QL: PRESENT
COLOR SPEC: YELLOW — SIGNIFICANT CHANGE UP
CREAT SERPL-MCNC: 0.83 MG/DL — SIGNIFICANT CHANGE UP (ref 0.5–1.3)
DIFF PNL FLD: NEGATIVE — SIGNIFICANT CHANGE UP
EGFR: 68 ML/MIN/1.73M2 — SIGNIFICANT CHANGE UP
EOSINOPHIL # BLD AUTO: 0.35 K/UL — SIGNIFICANT CHANGE UP (ref 0–0.5)
EOSINOPHIL NFR BLD AUTO: 2.7 % — SIGNIFICANT CHANGE UP (ref 0–6)
GLUCOSE SERPL-MCNC: 95 MG/DL — SIGNIFICANT CHANGE UP (ref 70–99)
GLUCOSE UR QL: NEGATIVE MG/DL — SIGNIFICANT CHANGE UP
HCT VFR BLD CALC: 36.9 % — SIGNIFICANT CHANGE UP (ref 34.5–45)
HGB BLD-MCNC: 11.7 G/DL — SIGNIFICANT CHANGE UP (ref 11.5–15.5)
IMM GRANULOCYTES NFR BLD AUTO: 0.5 % — SIGNIFICANT CHANGE UP (ref 0–0.9)
INR BLD: 1.47 RATIO — HIGH (ref 0.85–1.18)
KETONES UR-MCNC: NEGATIVE MG/DL — SIGNIFICANT CHANGE UP
LEUKOCYTE ESTERASE UR-ACNC: ABNORMAL
LYMPHOCYTES # BLD AUTO: 1 K/UL — SIGNIFICANT CHANGE UP (ref 1–3.3)
LYMPHOCYTES # BLD AUTO: 7.8 % — LOW (ref 13–44)
MCHC RBC-ENTMCNC: 31.7 GM/DL — LOW (ref 32–36)
MCHC RBC-ENTMCNC: 31.7 PG — SIGNIFICANT CHANGE UP (ref 27–34)
MCV RBC AUTO: 100 FL — SIGNIFICANT CHANGE UP (ref 80–100)
MONOCYTES # BLD AUTO: 0.94 K/UL — HIGH (ref 0–0.9)
MONOCYTES NFR BLD AUTO: 7.4 % — SIGNIFICANT CHANGE UP (ref 2–14)
NEUTROPHILS # BLD AUTO: 10.36 K/UL — HIGH (ref 1.8–7.4)
NEUTROPHILS NFR BLD AUTO: 81.2 % — HIGH (ref 43–77)
NITRITE UR-MCNC: NEGATIVE — SIGNIFICANT CHANGE UP
NRBC # BLD: 0 /100 WBCS — SIGNIFICANT CHANGE UP (ref 0–0)
PH UR: 6 — SIGNIFICANT CHANGE UP (ref 5–8)
PLATELET # BLD AUTO: 212 K/UL — SIGNIFICANT CHANGE UP (ref 150–400)
POTASSIUM SERPL-MCNC: 5.1 MMOL/L — SIGNIFICANT CHANGE UP (ref 3.5–5.3)
POTASSIUM SERPL-SCNC: 5.1 MMOL/L — SIGNIFICANT CHANGE UP (ref 3.5–5.3)
PROT SERPL-MCNC: 7.4 G/DL — SIGNIFICANT CHANGE UP (ref 6–8.3)
PROT UR-MCNC: NEGATIVE MG/DL — SIGNIFICANT CHANGE UP
PROTHROM AB SERPL-ACNC: 16 SEC — HIGH (ref 9.5–13)
RBC # BLD: 3.69 M/UL — LOW (ref 3.8–5.2)
RBC # FLD: 13.8 % — SIGNIFICANT CHANGE UP (ref 10.3–14.5)
RBC CASTS # UR COMP ASSIST: 1 /HPF — SIGNIFICANT CHANGE UP (ref 0–4)
REVIEW: SIGNIFICANT CHANGE UP
SODIUM SERPL-SCNC: 138 MMOL/L — SIGNIFICANT CHANGE UP (ref 135–145)
SP GR SPEC: 1.02 — SIGNIFICANT CHANGE UP (ref 1–1.03)
SQUAMOUS # UR AUTO: 3 /HPF — SIGNIFICANT CHANGE UP (ref 0–5)
UROBILINOGEN FLD QL: 0.2 MG/DL — SIGNIFICANT CHANGE UP (ref 0.2–1)
WBC # BLD: 12.77 K/UL — HIGH (ref 3.8–10.5)
WBC # FLD AUTO: 12.77 K/UL — HIGH (ref 3.8–10.5)
WBC UR QL: 3 /HPF — SIGNIFICANT CHANGE UP (ref 0–5)

## 2024-09-09 RX ORDER — CEFPODOXIME PROXETIL 100 MG/5ML
1 GRANULE, FOR SUSPENSION ORAL
Qty: 10 | Refills: 0
Start: 2024-09-09 | End: 2024-09-13

## 2024-09-09 RX ORDER — SERTRALINE HYDROCHLORIDE 50 MG/1
100 TABLET, FILM COATED ORAL DAILY
Refills: 0 | Status: ACTIVE | OUTPATIENT
Start: 2024-09-09 | End: 2025-08-08

## 2024-09-09 RX ORDER — CEPHALEXIN 500 MG
1 CAPSULE ORAL
Qty: 14 | Refills: 0
Start: 2024-09-09 | End: 2024-09-15

## 2024-09-09 RX ORDER — EZETIMIBE 10 MG/1
10 TABLET ORAL ONCE
Refills: 0 | Status: COMPLETED | OUTPATIENT
Start: 2024-09-09 | End: 2024-09-09

## 2024-09-09 RX ORDER — ALPRAZOLAM 0.25 MG
0.25 TABLET ORAL ONCE
Refills: 0 | Status: DISCONTINUED | OUTPATIENT
Start: 2024-09-09 | End: 2024-09-09

## 2024-09-09 RX ORDER — TRAZODONE HCL 50 MG
150 TABLET ORAL ONCE
Refills: 0 | Status: COMPLETED | OUTPATIENT
Start: 2024-09-09 | End: 2024-09-09

## 2024-09-09 RX ADMIN — SERTRALINE HYDROCHLORIDE 100 MILLIGRAM(S): 50 TABLET, FILM COATED ORAL at 18:04

## 2024-09-09 RX ADMIN — Medication 100 MILLIGRAM(S): at 18:04

## 2024-09-09 RX ADMIN — Medication 0.25 MILLIGRAM(S): at 18:04

## 2024-09-09 RX ADMIN — Medication 150 MILLIGRAM(S): at 18:04

## 2024-09-09 RX ADMIN — EZETIMIBE 10 MILLIGRAM(S): 10 TABLET ORAL at 18:04

## 2024-09-09 NOTE — ED PROVIDER NOTE - PATIENT PORTAL LINK FT
You can access the FollowMyHealth Patient Portal offered by [facility] by registering at the following website: [email or web address]. By joining [employer]’s FollowMyHealth portal, you will also be able to view your health information using other applications (apps) compatible with our system.

## 2024-09-09 NOTE — ED PROVIDER NOTE - PHYSICAL EXAMINATION
Physical Exam:  Gen: NAD, AOx4, non-toxic appearing, able to ambulate without assistance at baseline  Head: NCAT, has bruise on R midface from previous fall a week ago  HEENT: normal conjunctiva, tongue midline, oral mucosa moist, pupil is 2 mm b/l  Lung: CTAB, no respiratory distress, no wheezes/rhonchi/rales B/L, speaking in full sentences  CV: RRR, no murmurs, rubs or gallops  Abd: soft, NT, ND,no CVA tenderness   MSK: no visible deformities, no back pain  Skin: Warm, well perfused, no rash, no leg swelling Physical Exam:  Gen: NAD, AOx2, non-toxic appearing  Head: NCAT, has bruise on R midface from previous fall a week ago  HEENT: normal conjunctiva, tongue midline, oral mucosa moist, pupil is 2 mm b/l  Lung: CTAB, no respiratory distress, no wheezes/rhonchi/rales B/L, speaking in full sentences  CV: RRR, no murmurs, rubs or gallops  Abd: soft, NT, ND,no CVA tenderness   MSK: no visible deformities, no back pain  Skin: Warm, well perfused, no rash, no leg swelling

## 2024-09-09 NOTE — ED ADULT NURSE NOTE - OBJECTIVE STATEMENT
patient is an 88 y/o F with hx of dementia, hypothyroidism, HLD, HTM, afib BIBEMS from the [facility] s/p unwitnessed fall. per EMS patient was found on floor of facility after unknown amount of time. aide at bedside states patient is at baseline mental status. patient with bruises noted to face and arms in various stages of healing. patient a&ox2 (to self and place). respirations even and unlabored. abdomen soft, nondistended. denies fevers/chills, numbness/tingling, weakness, headache, dizziness, vision changes, cp, sob, cough, abd pain, n/v/d, dysuria, hematuria, bloody stools, back pain. updated on plan of care. comfort and safety maintained

## 2024-09-09 NOTE — ED ADULT NURSE NOTE - NSFALLHARMRISKINTERV_ED_ALL_ED

## 2024-09-09 NOTE — ED PROVIDER NOTE - CARE PLAN
Principal Discharge DX:	Fall   1 Principal Discharge DX:	Closed head injury  Secondary Diagnosis:	Unwitnessed fall

## 2024-09-09 NOTE — ED PROVIDER NOTE - PROGRESS NOTE DETAILS
ambulating with assistance Nichelle Min, Attending Physician: <late entry>, prior to discharge, resident reviewed workup with daughter Deborah who was amenable for transport back to [facility] as noted in attending attestation note.

## 2024-09-09 NOTE — ED PROVIDER NOTE - ATTENDING CONTRIBUTION TO CARE
89F PMHx demenita (AOx1), HLD, CHF, AS s/p TAVR, pAF on xarelto, hypothyroidism who presents s/p fall from [facility], unwitnessed without any physical signs of acute trauma. Pt with healing ecchymosis to R mid-face from recent and mid-frontal region wihtout palpalble hematoma.    Primary Survey:  A - airway intact  B - b/l breath sounds, symmetrical chest rise  C - equal radial pulses  D - GCS 15  E - Patient exposed    Secondary Survey:  Head: NCAT  EENT: no facial TTP, no septal hematoma, PERRL 3mm  Neck: no midline TTP  Chest: no chest wall TTP, RRR  Lungs: CTA b/l  Abd: soft, NTND  Pelvis: stable  MSK: no obvious deformities, no TTP of extremities, no midline spinal TTP   Neuro: awake & alert, AAOx2 (which is apparently baseline despite AAOx1 on prior admission)  Skin: no abrasions/lacerations     DDx includes but not limited to: contusion, intracranial bleeding 2/2 xarelto and CHI, concussion, UTI leading to generalized malaise, fracture. Will obtain screening labs, XR, CTs and disposition pending workup. I spoke with daughter, Liza, who reports that she feels the patient is safe for the [facility] despite frequent falls as the patient refuses to ambulate with her walker despite frequent redirection. Disposition pending workup.

## 2024-09-09 NOTE — ED PROVIDER NOTE - CONVERSATION DETAILS
Patient with living will - DNR/DNI, however no physical copy sent from [facility]. Will attempt to obtain copy to put in chart.

## 2024-09-09 NOTE — ED PROVIDER NOTE - CLINICAL SUMMARY MEDICAL DECISION MAKING FREE TEXT BOX
89F with PMH of Afib on Xarelto, HFrEF, HTN, dementia transferred from [facility] for an unwitnessed fall 4 hours ago.

## 2024-09-09 NOTE — ED ADULT NURSE REASSESSMENT NOTE - NS ED NURSE REASSESS COMMENT FT1
Patient straight cathed for urine using sterile technique. Second RN present to confirm sterility. Explained procedure as it was being done - Pt tolerated procedure well. Sterile specimens collected and sent to lab as ordered. drained aprox 250 ml of urine. Comfort and safety provided. UA/UC collected and sent to lab as per MD order

## 2024-09-09 NOTE — ED PROVIDER NOTE - OBJECTIVE STATEMENT
89F with PMH of Afib on Xarelto, HFrEF, HTN, dementia transferred from [facility] for an unwitnessed fall 4 hours ago. She was found in fetal position about 2-5 minutes later, and was limp according to her nurse. She had endorsed pain in the pelvic area at the time. Denies any recent fever, chills, body aches, nausea, vomiting, abd pain, or dysuria. Histrory taken as per her nurse at [facility]. Endorses taking Xarelto this morning. 89F with PMH of Afib on Xarelto, HFrEF, HTN, dementia transferred from [facility] for an unwitnessed fall 4 hours ago. She was found in fetal position about 2-5 minutes later, and was limp according to her nurse. She had endorsed pain in the pelvic area at the time. Denies any recent fever, chills, body aches, nausea, vomiting, abd pain, or dysuria. Histrory taken as per her nurse at [facility]. Endorses taking Xarelto this morning.

## 2024-09-10 VITALS
DIASTOLIC BLOOD PRESSURE: 72 MMHG | HEART RATE: 92 BPM | OXYGEN SATURATION: 93 % | RESPIRATION RATE: 18 BRPM | SYSTOLIC BLOOD PRESSURE: 121 MMHG

## 2024-09-10 LAB
CULTURE RESULTS: NO GROWTH — SIGNIFICANT CHANGE UP
SPECIMEN SOURCE: SIGNIFICANT CHANGE UP

## 2024-09-17 NOTE — ED ADULT TRIAGE NOTE - O2 FLOW (L/MIN)
Occupational Therapy Non-Attendance Note:    Pt has not attended scheduled occupational therapy sessions as of 09/17/2024. Encourage attendance and participation as appropriate.      2

## 2024-10-21 ENCOUNTER — EMERGENCY (EMERGENCY)
Facility: HOSPITAL | Age: 89
LOS: 1 days | Discharge: HOME | End: 2024-10-21
Attending: STUDENT IN AN ORGANIZED HEALTH CARE EDUCATION/TRAINING PROGRAM
Payer: MEDICARE

## 2024-10-21 VITALS
DIASTOLIC BLOOD PRESSURE: 100 MMHG | HEIGHT: 57 IN | RESPIRATION RATE: 20 BRPM | TEMPERATURE: 98 F | OXYGEN SATURATION: 94 % | HEART RATE: 92 BPM | SYSTOLIC BLOOD PRESSURE: 165 MMHG | WEIGHT: 125 LBS

## 2024-10-21 LAB
APPEARANCE UR: CLEAR — SIGNIFICANT CHANGE UP
BACTERIA # UR AUTO: NEGATIVE /HPF — SIGNIFICANT CHANGE UP
BILIRUB UR-MCNC: NEGATIVE — SIGNIFICANT CHANGE UP
CAST: 0 /LPF — SIGNIFICANT CHANGE UP (ref 0–4)
COLOR SPEC: YELLOW — SIGNIFICANT CHANGE UP
DIFF PNL FLD: NEGATIVE — SIGNIFICANT CHANGE UP
GLUCOSE UR QL: NEGATIVE MG/DL — SIGNIFICANT CHANGE UP
KETONES UR-MCNC: NEGATIVE MG/DL — SIGNIFICANT CHANGE UP
LEUKOCYTE ESTERASE UR-ACNC: ABNORMAL
NITRITE UR-MCNC: NEGATIVE — SIGNIFICANT CHANGE UP
PH UR: 6.5 — SIGNIFICANT CHANGE UP (ref 5–8)
PROT UR-MCNC: NEGATIVE MG/DL — SIGNIFICANT CHANGE UP
RBC CASTS # UR COMP ASSIST: 1 /HPF — SIGNIFICANT CHANGE UP (ref 0–4)
SP GR SPEC: 1.01 — SIGNIFICANT CHANGE UP (ref 1–1.03)
SQUAMOUS # UR AUTO: 0 /HPF — SIGNIFICANT CHANGE UP (ref 0–5)
UROBILINOGEN FLD QL: 0.2 MG/DL — SIGNIFICANT CHANGE UP (ref 0.2–1)
WBC UR QL: 2 /HPF — SIGNIFICANT CHANGE UP (ref 0–5)

## 2024-10-21 PROCEDURE — 90471 IMMUNIZATION ADMIN: CPT

## 2024-10-21 PROCEDURE — 81001 URINALYSIS AUTO W/SCOPE: CPT

## 2024-10-21 PROCEDURE — 99285 EMERGENCY DEPT VISIT HI MDM: CPT | Mod: 25

## 2024-10-21 PROCEDURE — 93005 ELECTROCARDIOGRAM TRACING: CPT

## 2024-10-21 PROCEDURE — 96372 THER/PROPH/DIAG INJ SC/IM: CPT

## 2024-10-21 PROCEDURE — 90715 TDAP VACCINE 7 YRS/> IM: CPT

## 2024-10-21 PROCEDURE — 87086 URINE CULTURE/COLONY COUNT: CPT

## 2024-10-21 PROCEDURE — 71045 X-RAY EXAM CHEST 1 VIEW: CPT | Mod: 26

## 2024-10-21 PROCEDURE — 71045 X-RAY EXAM CHEST 1 VIEW: CPT

## 2024-10-21 PROCEDURE — 99285 EMERGENCY DEPT VISIT HI MDM: CPT

## 2024-10-21 PROCEDURE — 82962 GLUCOSE BLOOD TEST: CPT

## 2024-10-21 RX ORDER — TETANUS TOXOID, REDUCED DIPHTHERIA TOXOID AND ACELLULAR PERTUSSIS VACCINE, ADSORBED 5; 2.5; 8; 8; 2.5 [IU]/.5ML; [IU]/.5ML; UG/.5ML; UG/.5ML; UG/.5ML
0.5 SUSPENSION INTRAMUSCULAR ONCE
Refills: 0 | Status: COMPLETED | OUTPATIENT
Start: 2024-10-21 | End: 2024-10-21

## 2024-10-21 RX ORDER — OLANZAPINE 2.5 MG
5 TABLET ORAL ONCE
Refills: 0 | Status: COMPLETED | OUTPATIENT
Start: 2024-10-21 | End: 2024-10-21

## 2024-10-21 RX ADMIN — Medication 0.5 MILLIGRAM(S): at 22:44

## 2024-10-21 RX ADMIN — TETANUS TOXOID, REDUCED DIPHTHERIA TOXOID AND ACELLULAR PERTUSSIS VACCINE, ADSORBED 0.5 MILLILITER(S): 5; 2.5; 8; 8; 2.5 SUSPENSION INTRAMUSCULAR at 19:30

## 2024-10-21 RX ADMIN — Medication 5 MILLIGRAM(S): at 19:11

## 2024-10-21 NOTE — ED PROVIDER NOTE - NSFOLLOWUPINSTRUCTIONS_ED_ALL_ED_FT
You were seen in the emergency department for a fall.    We have attempted to obtain CT and x-ray.  The decision was made with your daughter to observe for symptoms in place of this imaging.    Please return to the emergency department for worsening confusion, sleepiness, vomiting, pain or any new or concerning symptoms.
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
## 2024-10-21 NOTE — ED ADULT NURSE NOTE - NSFALLHARMRISKINTERV_ED_ALL_ED
SUBJECTIVE:   Chriss Soto is a 13 year old male, here for a routine health maintenance visit,   accompanied by his father.    Patient was roomed by: Myranda Carl    Do you have any forms to be completed?  YES    SOCIAL HISTORY  Family members in house: mother, father and brother  Language(s) spoken at home: English  Recent family changes/social stressors: none noted    SAFETY/HEALTH RISKS  TB exposure:  No  Do you monitor your child's screen use?  Yes  Cardiac risk assessment:     Family history (males <55, females <65) of angina (chest pain), heart attack, heart surgery for clogged arteries, or stroke:     Biological parent(s) with a total cholesterol over 240:  no    DENTAL  Dental health HIGH risk factors: none  Water source:  city water      VISION:  Testing not done; patient has seen eye doctor in the past 12 months.    HEARING:  Testing not done:  Done within the last year, no concerns    QUESTIONS/CONCERNS: None    SAFETY  Car seat belt always worn:  Yes  Helmet worn for bicycle/roller blades/skateboard?  Yes  Guns/firearms in the home: YES, Trigger locks present? YES, Ammunition separate from firearm: YES    ELECTRONIC MEDIA  TV in bedroom: No  < 2 hours/ day  Computer/video games:   TV/video/DVD:     EDUCATION  School:  [employer]  thGthrthathdtheth:th th8th School performance / Academic skills: doing well in school  Days of school missed: 5 or fewer  Concerns: no    ACTIVITIES  Do you get at least 60 minutes per day of physical activity, including time in and out of school: Yes  Extra-curricular activities: choir this year  Organized / team sports:  soccer    DIET  Do you get at least 4 helpings of a fruit or vegetable every day: NO  How many servings of juice, non-diet soda, punch or sports drinks per day: not many    SLEEP  No concerns, sleeps well through night and bedtime: 9:30    ============================================================    PSYCHO-SOCIAL/DEPRESSION  General screening:  No screening  "tool used  No concerns    PROBLEM LIST  There is no problem list on file for this patient.    MEDICATIONS  No current outpatient prescriptions on file.      ALLERGY  No Known Allergies    IMMUNIZATIONS  Immunization History   Administered Date(s) Administered     DTAP (<7y) 01/15/2008     DTAP-IPV, <7Y 08/08/2011     DTaP / Hep B / IPV 2006, 2006, 01/22/2007     Hib (PRP-T) 2006, 2006     Influenza (H1N1) 11/21/2009, 12/29/2009     Influenza (IIV3) PF 02/21/2007, 01/15/2008, 12/07/2010, 01/31/2011, 11/22/2011, 01/22/2013     MMR 08/08/2011, 11/22/2011     Pneumococcal (PCV 7) 2006, 2006, 01/22/2007, 07/10/2007     Varicella 07/10/2007, 07/14/2010       HEALTH HISTORY SINCE LAST VISIT  No surgery, major illness or injury since last physical exam    DRUGS  Smoking:  no  Passive smoke exposure:  no  Alcohol:  no  Drugs:  no        ROS  Constitutional, eye, ENT, skin, respiratory, cardiac, GI, MSK, neuro, and allergy are normal except as otherwise noted.    OBJECTIVE:   EXAM  BP 98/64 (BP Location: Left arm, Patient Position: Sitting, Cuff Size: Adult Regular)  Pulse 98  Temp 96.2  F (35.7  C) (Tympanic)  Resp 12  Ht 5' 5\" (1.651 m)  Wt 130 lb 9.6 oz (59.2 kg)  SpO2 100%  BMI 21.73 kg/m2  98 %ile based on CDC 2-20 Years stature-for-age data using vitals from 7/26/2018.  95 %ile based on CDC 2-20 Years weight-for-age data using vitals from 7/26/2018.  88 %ile based on CDC 2-20 Years BMI-for-age data using vitals from 7/26/2018.  Blood pressure percentiles are 13.3 % systolic and 51.3 % diastolic based on the August 2017 AAP Clinical Practice Guideline.       GENERAL: Active, alert, in no acute distress.  SKIN: Clear. No significant rash, abnormal pigmentation or lesions  HEAD: Normocephalic  EYES: Pupils equal, round, reactive, Extraocular muscles intact. Normal conjunctivae.  EARS: Normal canals. Tympanic membranes are normal; gray and translucent.  NOSE: Normal without " discharge.  MOUTH/THROAT: Clear. No oral lesions. Teeth without obvious abnormalities.  NECK: Supple, no masses.  No thyromegaly.  LYMPH NODES: No adenopathy  LUNGS: Clear. No rales, rhonchi, wheezing or retractions  HEART: Regular rhythm. Normal S1/S2. No murmurs. Normal pulses.  ABDOMEN: Soft, non-tender, not distended, no masses or hepatosplenomegaly. Bowel sounds normal.   NEUROLOGIC: No focal findings. Cranial nerves grossly intact: DTR's normal. Normal gait, strength and tone  BACK: Spine is straight, no scoliosis.  EXTREMITIES: Full range of motion, no deformities    SPORTS EXAM:    No Marfan stigmata: kyphoscoliosis, high-arched palate, pectus excavatuM, arachnodactyly, arm span > height, hyperlaxity, myopia, MVP, aortic insufficieny)  Eyes: normal fundoscopic and pupils  Cardiovascular: normal PMI, simultaneous femoral/radial pulses, no murmurs (standing, supine, Valsalva)  Skin: no HSV, MRSA, tinea corporis  Musculoskeletal    Neck: normal    Back: normal    Shoulder/arm: normal    Elbow/forearm: normal    Wrist/hand/fingers: normal    Hip/thigh: normal    Knee: normal    Leg/ankle: normal    Foot/toes: normal    Functional (Single Leg Hop or Squat): normal    ASSESSMENT/PLAN:   1. Encounter for routine child health examination w/o abnormal findings  - BEHAVIORAL / EMOTIONAL ASSESSMENT [90089]  - Screening Questionnaire for Immunizations  - MENINGOCOCCAL VACCINE,IM (MENACTRA) [85919]  - TDAP VACCINE (ADACEL) [10179.002]  - VACCINE ADMINISTRATION, INITIAL  - VACCINE ADMINISTRATION, EACH ADDITIONAL    2. Need for vaccination  - HUMAN PAPILLOMA VIRUS (GARDASIL 9) VACCINE [29151]    3. Routine sports physical exam  - Forms completed      Anticipatory Guidance  The following topics were discussed:  SOCIAL/ FAMILY:    Peer pressure    Bullying    Increased responsibility    Parent/ teen communication    Limits/consequences    Social media    TV/ media    School/ homework  NUTRITION:    Healthy food choices     Family meals    Calcium    Vitamins/supplements  HEALTH/ SAFETY:    Adequate sleep/ exercise    Sleep issues    Dental care    Drugs, ETOH, smoking    Body image    Seat belts    Swim/ water safety    Sunscreen/ insect repellent  SEXUALITY:    Body changes with puberty    Preventive Care Plan  Immunizations    Reviewed, up to date  Referrals/Ongoing Specialty care: No   See other orders in EpicCare.  Cleared for sports:  Yes  BMI at 88 %ile based on CDC 2-20 Years BMI-for-age data using vitals from 7/26/2018.  No weight concerns.  Dyslipidemia risk:    None  Dental visit recommended: Dental home established, continue care every 6 months      FOLLOW-UP:     in 1 year for a Preventive Care visit    Resources  HPV and Cancer Prevention:  What Parents Should Know  What Kids Should Know About HPV and Cancer  Goal Tracker: Be More Active  Goal Tracker: Less Screen Time  Goal Tracker: Drink More Water  Goal Tracker: Eat More Fruits and Veggies  [place] Child and Teen Checkups (C&TC) Schedule of Age-Related Screening Standards    Abibe Greenberg NP  [facility]   Communicate risk of Fall with Harm to all staff, patient, and family/Provide visual cue: red socks, yellow wristband, yellow gown, etc/Reinforce activity limits and safety measures with patient and family/Use of alarms - bed, stretcher, chair and/or video monitoring/Bed in lowest position, wheels locked, appropriate side rails in place/Call bell, personal items and telephone in reach/Instruct patient to call for assistance before getting out of bed/chair/stretcher/Non-slip footwear applied when patient is off stretcher/[place] to call system/Physically safe environment - no spills, clutter or unnecessary equipment/Purposeful Proactive Rounding/Room/bathroom lighting operational, light cord in reach

## 2024-10-21 NOTE — ED ADULT NURSE NOTE - PRO INTERPRETER NEED 2
Detail Level: Detailed Add 58592 Cpt? (Important Note: In 2017 The Use Of 52919 Is Being Tracked By Cms To Determine Future Global Period Reimbursement For Global Periods): no English

## 2024-10-21 NOTE — ED ADULT NURSE NOTE - OBJECTIVE STATEMENT
Pt 88 y/o female, AxOx2, presents to ED from [facility] for unwitnessed fall. PMH of HF, afib on xarelto as per NH paperwork, Dementia typically AxOx1 at baseline as per EMS. Pt is well appearing, speaking full sentences without difficulty. Breathing spontaneous and unlabored. 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 Bruising to left forehead, L elbow skin tear- bleeding controlled. Denies any pain, ROM intact, all extremities strong and equal. Safety and comfort measures initiated- bed placed in lowest position and side rails raised.

## 2024-10-21 NOTE — ED PROVIDER NOTE - PATIENT PORTAL LINK FT
You can access the FollowMyHealth Patient Portal offered by [facility] by registering at the following website: [email or web address]. By joining [employer]’s FollowMyHealth portal, you will also be able to view your health information using other applications (apps) compatible with our system.

## 2024-10-21 NOTE — ED ADULT NURSE REASSESSMENT NOTE - NS ED NURSE REASSESS COMMENT FT1
Break coverage RN. Patient a/ox1, breathing spontaneously. Patient noted to be extremely agitated. Patient is yelling and throwing items at staff. RN unable to obtain vitals. As per MD Multani, RN to administer IM zyprexa. Safety and comfort provided.

## 2024-10-21 NOTE — ED PROVIDER NOTE - WR ORDER DATE AND TIME 1
Admission Reconciliation is Completed  Discharge Reconciliation is Not Complete Admission Reconciliation is Completed  Discharge Reconciliation is Completed 21-Oct-2024 18:28
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
## 2024-10-21 NOTE — ED ADULT NURSE REASSESSMENT NOTE - NS ED NURSE REASSESS COMMENT FT1
pt transported back from CT due to not cooperating with laying still. additional medication placed and administered as per MD Ariza. Pt is calm in red room 36L.

## 2024-10-22 VITALS
SYSTOLIC BLOOD PRESSURE: 132 MMHG | OXYGEN SATURATION: 97 % | RESPIRATION RATE: 17 BRPM | DIASTOLIC BLOOD PRESSURE: 80 MMHG | HEART RATE: 70 BPM | TEMPERATURE: 98 F

## 2024-10-22 PROBLEM — N39.0 URINARY TRACT INFECTION, SITE NOT SPECIFIED: Chronic | Status: ACTIVE | Noted: 2024-07-09

## 2024-10-22 PROBLEM — R41.89 OTHER SYMPTOMS AND SIGNS INVOLVING COGNITIVE FUNCTIONS AND AWARENESS: Chronic | Status: ACTIVE | Noted: 2024-07-09

## 2024-10-22 PROBLEM — E03.9 HYPOTHYROIDISM, UNSPECIFIED: Chronic | Status: ACTIVE | Noted: 2024-07-09

## 2024-10-22 PROBLEM — I35.0 NONRHEUMATIC AORTIC (VALVE) STENOSIS: Chronic | Status: ACTIVE | Noted: 2024-07-09

## 2024-10-22 PROBLEM — I48.0 PAROXYSMAL ATRIAL FIBRILLATION: Chronic | Status: ACTIVE | Noted: 2024-07-09

## 2024-10-22 PROBLEM — E78.5 HYPERLIPIDEMIA, UNSPECIFIED: Chronic | Status: ACTIVE | Noted: 2024-07-09

## 2024-10-22 PROBLEM — I10 ESSENTIAL (PRIMARY) HYPERTENSION: Chronic | Status: ACTIVE | Noted: 2024-07-09

## 2024-10-22 PROBLEM — F39 UNSPECIFIED MOOD [AFFECTIVE] DISORDER: Chronic | Status: ACTIVE | Noted: 2024-07-09

## 2024-10-22 PROBLEM — I50.42 CHRONIC COMBINED SYSTOLIC (CONGESTIVE) AND DIASTOLIC (CONGESTIVE) HEART FAILURE: Chronic | Status: ACTIVE | Noted: 2024-07-09

## 2024-10-22 LAB
CULTURE RESULTS: SIGNIFICANT CHANGE UP
SPECIMEN SOURCE: SIGNIFICANT CHANGE UP

## 2024-10-22 NOTE — ED ADULT NURSE REASSESSMENT NOTE - NS ED NURSE REASSESS COMMENT FT1
Pt resting in bed, VSS, NAD. Awaiting nonemergent ambulance back to [place] at this time, attempted to update pt, A+Ox1, aide not currently at bedside. Comfort and safety maintained.

## 2024-10-22 NOTE — ED ADULT NURSE REASSESSMENT NOTE - NS ED NURSE REASSESS COMMENT FT1
Handoff taken from REKHA Gillespie in red. Introduced self to pt and aide, resting in bed, VSS. Awaiting CT head and results, mediated to help with agitation, updated aide on POC. Comfort and safety maintained at this time.

## 2024-12-24 PROBLEM — F10.90 ALCOHOL USE: Status: ACTIVE | Noted: 2017-11-24

## 2025-01-28 ENCOUNTER — EMERGENCY (EMERGENCY)
Facility: HOSPITAL | Age: 89
LOS: 1 days | Discharge: HOME | End: 2025-01-28
Attending: EMERGENCY MEDICINE
Payer: MEDICARE

## 2025-01-28 VITALS
HEIGHT: 64 IN | RESPIRATION RATE: 17 BRPM | TEMPERATURE: 98 F | HEART RATE: 75 BPM | DIASTOLIC BLOOD PRESSURE: 96 MMHG | SYSTOLIC BLOOD PRESSURE: 144 MMHG | WEIGHT: 104.94 LBS | OXYGEN SATURATION: 99 %

## 2025-01-28 VITALS
TEMPERATURE: 98 F | SYSTOLIC BLOOD PRESSURE: 148 MMHG | RESPIRATION RATE: 18 BRPM | OXYGEN SATURATION: 94 % | HEART RATE: 78 BPM | DIASTOLIC BLOOD PRESSURE: 85 MMHG

## 2025-01-28 LAB
ALBUMIN SERPL ELPH-MCNC: 3.6 G/DL — SIGNIFICANT CHANGE UP (ref 3.3–5)
ALP SERPL-CCNC: 68 U/L — SIGNIFICANT CHANGE UP (ref 40–120)
ALT FLD-CCNC: 21 U/L — SIGNIFICANT CHANGE UP (ref 10–45)
ANION GAP SERPL CALC-SCNC: 11 MMOL/L — SIGNIFICANT CHANGE UP (ref 5–17)
APPEARANCE UR: CLEAR — SIGNIFICANT CHANGE UP
AST SERPL-CCNC: 23 U/L — SIGNIFICANT CHANGE UP (ref 10–40)
BACTERIA # UR AUTO: NEGATIVE /HPF — SIGNIFICANT CHANGE UP
BASOPHILS # BLD AUTO: 0.06 K/UL — SIGNIFICANT CHANGE UP (ref 0–0.2)
BASOPHILS NFR BLD AUTO: 0.8 % — SIGNIFICANT CHANGE UP (ref 0–2)
BILIRUB SERPL-MCNC: 0.4 MG/DL — SIGNIFICANT CHANGE UP (ref 0.2–1.2)
BILIRUB UR-MCNC: NEGATIVE — SIGNIFICANT CHANGE UP
BUN SERPL-MCNC: 24 MG/DL — HIGH (ref 7–23)
CALCIUM SERPL-MCNC: 9.2 MG/DL — SIGNIFICANT CHANGE UP (ref 8.4–10.5)
CAST: 0 /LPF — SIGNIFICANT CHANGE UP (ref 0–4)
CHLORIDE SERPL-SCNC: 108 MMOL/L — SIGNIFICANT CHANGE UP (ref 96–108)
CO2 SERPL-SCNC: 23 MMOL/L — SIGNIFICANT CHANGE UP (ref 22–31)
COLOR SPEC: YELLOW — SIGNIFICANT CHANGE UP
CREAT SERPL-MCNC: 0.89 MG/DL — SIGNIFICANT CHANGE UP (ref 0.5–1.3)
DIFF PNL FLD: NEGATIVE — SIGNIFICANT CHANGE UP
EGFR: 62 ML/MIN/1.73M2 — SIGNIFICANT CHANGE UP
EOSINOPHIL # BLD AUTO: 0.16 K/UL — SIGNIFICANT CHANGE UP (ref 0–0.5)
EOSINOPHIL NFR BLD AUTO: 2 % — SIGNIFICANT CHANGE UP (ref 0–6)
FLUAV AG NPH QL: SIGNIFICANT CHANGE UP
FLUBV AG NPH QL: SIGNIFICANT CHANGE UP
GLUCOSE SERPL-MCNC: 84 MG/DL — SIGNIFICANT CHANGE UP (ref 70–99)
GLUCOSE UR QL: NEGATIVE MG/DL — SIGNIFICANT CHANGE UP
HCT VFR BLD CALC: 38.8 % — SIGNIFICANT CHANGE UP (ref 34.5–45)
HGB BLD-MCNC: 12.5 G/DL — SIGNIFICANT CHANGE UP (ref 11.5–15.5)
IMM GRANULOCYTES NFR BLD AUTO: 0.4 % — SIGNIFICANT CHANGE UP (ref 0–0.9)
KETONES UR-MCNC: NEGATIVE MG/DL — SIGNIFICANT CHANGE UP
LEUKOCYTE ESTERASE UR-ACNC: NEGATIVE — SIGNIFICANT CHANGE UP
LYMPHOCYTES # BLD AUTO: 1.36 K/UL — SIGNIFICANT CHANGE UP (ref 1–3.3)
LYMPHOCYTES # BLD AUTO: 17.4 % — SIGNIFICANT CHANGE UP (ref 13–44)
MCHC RBC-ENTMCNC: 32.1 PG — SIGNIFICANT CHANGE UP (ref 27–34)
MCHC RBC-ENTMCNC: 32.2 G/DL — SIGNIFICANT CHANGE UP (ref 32–36)
MCV RBC AUTO: 99.5 FL — SIGNIFICANT CHANGE UP (ref 80–100)
MONOCYTES # BLD AUTO: 0.53 K/UL — SIGNIFICANT CHANGE UP (ref 0–0.9)
MONOCYTES NFR BLD AUTO: 6.8 % — SIGNIFICANT CHANGE UP (ref 2–14)
NEUTROPHILS # BLD AUTO: 5.68 K/UL — SIGNIFICANT CHANGE UP (ref 1.8–7.4)
NEUTROPHILS NFR BLD AUTO: 72.6 % — SIGNIFICANT CHANGE UP (ref 43–77)
NITRITE UR-MCNC: NEGATIVE — SIGNIFICANT CHANGE UP
NRBC # BLD: 0 /100 WBCS — SIGNIFICANT CHANGE UP (ref 0–0)
PH UR: 5.5 — SIGNIFICANT CHANGE UP (ref 5–8)
PLATELET # BLD AUTO: 139 K/UL — LOW (ref 150–400)
POTASSIUM SERPL-MCNC: 4.4 MMOL/L — SIGNIFICANT CHANGE UP (ref 3.5–5.3)
POTASSIUM SERPL-SCNC: 4.4 MMOL/L — SIGNIFICANT CHANGE UP (ref 3.5–5.3)
PROT SERPL-MCNC: 6.4 G/DL — SIGNIFICANT CHANGE UP (ref 6–8.3)
PROT UR-MCNC: NEGATIVE MG/DL — SIGNIFICANT CHANGE UP
RBC # BLD: 3.9 M/UL — SIGNIFICANT CHANGE UP (ref 3.8–5.2)
RBC # FLD: 13.2 % — SIGNIFICANT CHANGE UP (ref 10.3–14.5)
RBC CASTS # UR COMP ASSIST: 2 /HPF — SIGNIFICANT CHANGE UP (ref 0–4)
RSV RNA NPH QL NAA+NON-PROBE: SIGNIFICANT CHANGE UP
SARS-COV-2 RNA SPEC QL NAA+PROBE: SIGNIFICANT CHANGE UP
SODIUM SERPL-SCNC: 142 MMOL/L — SIGNIFICANT CHANGE UP (ref 135–145)
SP GR SPEC: 1.03 — SIGNIFICANT CHANGE UP (ref 1–1.03)
SQUAMOUS # UR AUTO: 3 /HPF — SIGNIFICANT CHANGE UP (ref 0–5)
UROBILINOGEN FLD QL: 0.2 MG/DL — SIGNIFICANT CHANGE UP (ref 0.2–1)
WBC # BLD: 7.82 K/UL — SIGNIFICANT CHANGE UP (ref 3.8–10.5)
WBC # FLD AUTO: 7.82 K/UL — SIGNIFICANT CHANGE UP (ref 3.8–10.5)
WBC UR QL: 2 /HPF — SIGNIFICANT CHANGE UP (ref 0–5)

## 2025-01-28 PROCEDURE — 80053 COMPREHEN METABOLIC PANEL: CPT

## 2025-01-28 PROCEDURE — 71045 X-RAY EXAM CHEST 1 VIEW: CPT

## 2025-01-28 PROCEDURE — 87637 SARSCOV2&INF A&B&RSV AMP PRB: CPT

## 2025-01-28 PROCEDURE — 99284 EMERGENCY DEPT VISIT MOD MDM: CPT

## 2025-01-28 PROCEDURE — 85025 COMPLETE CBC W/AUTO DIFF WBC: CPT

## 2025-01-28 PROCEDURE — 99284 EMERGENCY DEPT VISIT MOD MDM: CPT | Mod: 25

## 2025-01-28 PROCEDURE — 87086 URINE CULTURE/COLONY COUNT: CPT

## 2025-01-28 PROCEDURE — 96374 THER/PROPH/DIAG INJ IV PUSH: CPT

## 2025-01-28 PROCEDURE — 71045 X-RAY EXAM CHEST 1 VIEW: CPT | Mod: 26

## 2025-01-28 PROCEDURE — 81001 URINALYSIS AUTO W/SCOPE: CPT

## 2025-01-28 RX ORDER — SODIUM CHLORIDE 9 MG/ML
500 INJECTION, SOLUTION INTRAMUSCULAR; INTRAVENOUS; SUBCUTANEOUS ONCE
Refills: 0 | Status: COMPLETED | OUTPATIENT
Start: 2025-01-28 | End: 2025-01-28

## 2025-01-28 RX ORDER — MIDAZOLAM HYDROCHLORIDE 1 MG/ML
0.5 INJECTION INTRAMUSCULAR; INTRAVENOUS ONCE
Refills: 0 | Status: DISCONTINUED | OUTPATIENT
Start: 2025-01-28 | End: 2025-01-28

## 2025-01-28 RX ORDER — ALPRAZOLAM 0.25 MG/1
0.25 TABLET ORAL ONCE
Refills: 0 | Status: DISCONTINUED | OUTPATIENT
Start: 2025-01-28 | End: 2025-01-28

## 2025-01-28 RX ADMIN — SODIUM CHLORIDE 500 MILLILITER(S): 9 INJECTION, SOLUTION INTRAMUSCULAR; INTRAVENOUS; SUBCUTANEOUS at 15:11

## 2025-01-28 RX ADMIN — MIDAZOLAM HYDROCHLORIDE 0.5 MILLIGRAM(S): 1 INJECTION INTRAMUSCULAR; INTRAVENOUS at 17:55

## 2025-01-28 NOTE — ED ADULT NURSE REASSESSMENT NOTE - NS ED NURSE REASSESS COMMENT FT1
Report received from Arlyn DA SILVA. Pt awaiting non-emergent; in no acute distress at time. Aide remains at bedside. Patient safety maintained, bed is in lowest position, wheels locked, and side rails raised.

## 2025-01-28 NOTE — ED ADULT NURSE NOTE - CAS ELECT INFOMATION PROVIDED
DC instructions given to aide and EMS staff at bedside; RN attempted to call report to João at [facility] at [phone] no answer at this time/DC instructions

## 2025-01-28 NOTE — ED ADULT NURSE NOTE - BIRTH SEX
Female Rhombic Flap Text: The defect edges were debeveled with a #15 scalpel blade.  Given the location of the defect and the proximity to free margins a rhombic flap was deemed most appropriate.  Using a sterile surgical marker, an appropriate rhombic flap was drawn incorporating the defect.    The area thus outlined was incised deep to adipose tissue with a #15 scalpel blade.  The skin margins were undermined to an appropriate distance in all directions utilizing iris scissors.

## 2025-01-28 NOTE — ED PROVIDER NOTE - PATIENT PORTAL LINK FT
You can access the FollowMyHealth Patient Portal offered by [facility] by registering at the following website: [email or web address]. By joining [employer]’s FollowMyHealth portal, you will also be able to view your health information using other applications (apps) compatible with our system.

## 2025-01-28 NOTE — ED ADULT TRIAGE NOTE - SPO2 (%)
This note was copied from the mother's chart.     05/26/24 1137   Maternal Assessment   Breast Shape Bilateral:;round   Breast Density Bilateral:;filling   Areola Bilateral:;elastic   Nipples Bilateral:;everted;other (see comments)  (small)   Left Nipple Symptoms abraded;redness;tender   Right Nipple Symptoms tender;redness   Maternal Infant Feeding   Maternal Emotional State assist needed   Infant Positioning clutch/football   Signs of Milk Transfer audible swallow;infant jaw motion present   Pain with Feeding no   Latch Assistance yes   Community Referrals   Community Referrals outpatient lactation program     Baby swaddled, awkwardly position, and nipple feeding when LC entered the room, which Pt explained that she has been using all night. LC reminded Pt of the importance of obtaining deep latch and reinforced nutritive versus non-nutritive feedings.  Pt agreeable to shifting positions and removing swaddle. Pt requiring prompts to assist placing baby in position. Breast compression with stimulation utilized to keep baby actively feeding. Baby does well with compression. LC encouraged Pt to pump after feedings three times a day for extra stimulation. LC reminded Pt to feed on cue or at least every three hours. Lactation discharge education completed. Education on use and maintenance of hydrogels provided.Plan of care is for pt to follow basic breastfeeding education, frequent feeding based on baby's cues or at least every three hours, and to monitor baby's voids and stools. Breastfeeding section,  First Alert survey, resource list, and lactation warmline phone number reviewed. Pt to notify doctor for maternal or infant concerns, as reviewed with LC. Pt verbalizes understanding and questions answered.    99

## 2025-01-28 NOTE — ED PROVIDER NOTE - CLINICAL SUMMARY MEDICAL DECISION MAKING FREE TEXT BOX
Mateo  ED 89-year-old female multiple medical problems coming from skilled nursing facility patient more lethargic and less responsive than recently no recent trauma no known fever no cough patient does report urinary frequency but unsure why she was sent to the emergency room on examination hemodynamically stable patient awake alert oriented x 2 unknown baseline room air sats 99% not tachycardic or high low tensive respiratory rate 22 on examination breath sounds clear bilaterally abdomen soft nontender pupils equal round and reactive to extraocular intact no midline C-spine tenderness strength 5 out of 5 upper and lower extremity bilaterally will check rectal temp as concern for infectious etiology for AMS straight cath for urine swab for flu and COVID and reevaluate after labs

## 2025-01-28 NOTE — ED PROVIDER NOTE - PROGRESS NOTE DETAILS
pt labs nonactionable ua neg, afebrile- meds reviewed on benzo, ssri and meatonin which may contribute to ams Edgar Valdez, DO (PGY-2) Patient labs non actionable No leukocytosis. No UTI. Flu/covid negative. Brought to CT however she is declining CT stating "I do not want it, they tried to do it last time and I did not want to then either, I am the patient I have the right to refuse".  Patient not able to elaborate as to why she does not want a head CT so I called her daughter Sara and we all spoke via telephone.  I explained the results to Sara including the blood work and x-ray and Sara feels that her mother's lethargy from earlier was likely due to medication she got and not eating at the facility.  I offered calling the option of giving her mother anxiolytics for his CT however she is in agreement that her mother is better off going back and being watched.  Patient is agitated and requesting to leave and I told Sara beginning her mother a small amount of medication to help her relax and she is amenable with this plan.  Patient's aide Rufino is at bedside and witnessed this conversation.  I told Sara that we will be arranging a nonemergent ambulette back to the [facility] and she is amenable with this plan.  Patient ate a sandwich is tolerating p.o. and is ambulatory. Patient not responding to verbal de-escalation fighting with staff and attempting to elope.  Will give IV Versed due to short onset and duration of action. Patient not responding to verbal de-escalation fighting with staff and attempting to elope.  Will give IV Versed due to short onset and duration of action. Spoke to aid at bedside who will relay info to patients' daughter. Edgar Valdez DO (PGY-2) patient calm and cooperative at this time. pending non emergent.

## 2025-01-28 NOTE — ED PROVIDER NOTE - PHYSICAL EXAMINATION
GENERAL: NAD, Talking and moving all extremities  HEENT:  Atraumatic  CHEST/LUNG: Chest rise equal bilaterally cta b/l no w/r/r  HEART: Regular rate and rhythm normal s1/s2  ABDOMEN: Soft, Nontender, Nondistended  EXTREMITIES:  Extremities warm  PSYCH: Oriented to place only (daughter noted this is baseline)  SKIN: No obvious rashes or lesions  MSK: No cervical spine TTP, able to range neck to the left and right/ No midline spinal TTP/ No swelling, redness, pain or discharge from   NEUROLOGY: strength and sensation intact in all extremities. Finger to nose test intact. No pronator drift. no facial droop. no dysarthria.

## 2025-01-28 NOTE — ED ADULT NURSE NOTE - NSFALLRISKINTERV_ED_ALL_ED
Assistance OOB with selected safe patient handling equipment if applicable/Communicate fall risk and risk factors to all staff, patient, and family/Monitor for mental status changes and reorient to person, place, and time, as needed/Move patient closer to nursing station/within visual sight of ED staff/Provide visual cue: yellow wristband, yellow gown, etc/Reinforce activity limits and safety measures with patient and family/Toileting schedule using arm’s reach rule for commode and bathroom/Use of alarms - bed, stretcher, chair and/or video monitoring/Call bell, personal items and telephone in reach/Instruct patient to call for assistance before getting out of bed/chair/stretcher/Non-slip footwear applied when patient is off stretcher/[place] to call system/Physically safe environment - no spills, clutter or unnecessary equipment/Purposeful Proactive Rounding/Room/bathroom lighting operational, light cord in reach

## 2025-01-28 NOTE — ED ADULT NURSE NOTE - OBJECTIVE STATEMENT
89 y.o F coming from [facility] assisted living c.o  multiple medical problems  patient more lethargic and less responsive than recently no recent trauma no known fever no cough patient does report urinary frequency but unsure why she was sent to the emergency room Pt denies CP, SOB, HA, vision changes, n/v/d, fevers chills, abdominal pain. Upon assessment, abdomen soft and nontender, +strong peripheral pulses, moving all extremities without difficulty,

## 2025-01-28 NOTE — ED PROVIDER NOTE - NSFOLLOWUPINSTRUCTIONS_ED_ALL_ED_FT
You were seen in the emergency department for lethargy/being very tired. We have evaluated you and determined that you do not require further hospital interventions.    During your stay you had the following relevant results:   Lab work without signs of infection  The x-ray does have some possible nodule but you denied a cough. if you develop cough or fevers please see a doctor.       Please follow up with your primary care provider as necessary to discuss the results of your stay in our department.

## 2025-01-28 NOTE — ED ADULT TRIAGE NOTE - BSA (M2)
Chief Complaint   Patient presents with   • Lab Follow-up       HISTORY OF PRESENT ILLNESS: Patient is a 61 y.o. female established patient who presents today to discuss the following issues:    Gastroesophageal reflux disease without esophagitis  Patient states that she has been on cimetidine for her chronic GERD but that it is not controlling her symptoms well.  She states that before she was on famotidine 20 mg twice daily and that worked well for her but she was taken off of it.  She is asking if there is another medication that she can try to help control her symptoms.  We will start her on omeprazole 20 mg daily.  We will follow-up with her in 4 to 6 weeks to see if this is controlling her symptoms adequately.    Immunization due  Patient is due for her second zoster vaccination.  We are out of zoster vaccine in the clinic.  A prescription has been written for her to take to the pharmacy for her second dose of the vaccine.    Prediabetes  This is been a problem for this patient in the past. Her hemoglobin A1c done 7/9/2018 was at 6.1%.  Her most recent hemoglobin A1c done 1/16/2021 is now at 5.5%.  I have encouraged her to continue what she is doing as it has done wonders for her A1c.  Her  is diabetic and she is now following the same diet that he is on and this is helping her.  We will recheck her labs in 1 year.    Mild hyperlipidemia  Most recent lipid panel done 1/12/2021 total cholesterol 202, triglycerides 104, HDL 50, .  She has done some diet modification and started following a diabetic diet that her  is on.  We had the discussion about watching her fat intake and getting some sort of physical activity in every day.  She is willing to work on this.  Her cholesterol does not warrant medication at this time.  We will follow-up in 1 year with repeat labs.    Essential hypertension  Chronic condition for this patient well controlled on Prinzide 20-25 mg daily, amlodipine 2.5 mg  daily.  Blood pressure in the office today 140/84.  Patient's  has just been released from the hospital after a prolonged stay for diabetes complications.  She has been very stressed taking care of him since he got home.  She is doing her best to keep up with her diet and exercise.  We will continue to monitor.  She will continue her current medications at the current dose.      Patient Active Problem List    Diagnosis Date Noted   • Gastroesophageal reflux disease without esophagitis 02/10/2021   • Immunization due 02/10/2021   • Healthcare maintenance 09/02/2020   • Environmental and seasonal allergies 09/02/2020   • Mixed stress and urge urinary incontinence 09/10/2019   • Class 2 obesity due to excess calories with body mass index (BMI) of 39.0 to 39.9 in adult 08/13/2019   • Picking own skin 08/13/2019   • Essential hypertension 04/10/2018   • Hot flashes due to menopause 04/10/2018   • Bilateral lower extremity edema 04/10/2018   • History of methamphetamine use 04/10/2018   • Uncomplicated opioid dependence (HCC) 04/10/2018   • Abnormal EKG 04/10/2018   • Pain management contract agreement 01/05/2018   • Decreased hearing of both ears 01/05/2018   • Prediabetes 04/20/2017   • Mild hyperlipidemia 04/20/2017   • Abnormal drug screen 04/20/2017   • Pain of both hip joints 02/01/2017   • Chronic right shoulder pain 02/01/2017   • Bipolar 2 disorder, major depressive episode (HCC) 05/03/2016   • Insomnia 05/03/2016   • Bilateral low back pain with right-sided sciatica 03/04/2016   • Anxiety 03/04/2016       Allergies:Tetanus toxoids    Current Outpatient Medications   Medication Sig Dispense Refill   • Zoster Vac Recomb Adjuvanted (SHINGRIX) 50 MCG/0.5ML Recon Susp Inject 0.5 mL into the shoulder, thigh, or buttocks one time for 1 dose. 1 Each 0   • omeprazole (PRILOSEC) 20 MG delayed-release capsule Take 1 capsule by mouth every day. 90 capsule 3   • venlafaxine XR (EFFEXOR XR) 37.5 MG CAPSULE SR 24 HR  Take 1 Cap by mouth every day.     • oxybutynin SR (DITROPAN-XL) 5 MG TABLET SR 24 HR GENERIC FOR DITROPAN XL- TAKE ONE TABLET BY MOUTH DAILY 30 Tab 1   • lamotrigine (LAMICTAL) 150 MG tablet Take 1 Tab by mouth every day.     • hydrOXYzine HCl (ATARAX) 50 MG Tab Take 1 Tab by mouth 3 times a day.     • doxepin (SINEQUAN) 25 MG Cap Take 2 Caps by mouth every bedtime.     • hydrocortisone 1 % Cream Apply small amount to affected areas twice a day. 14 g 1   • cetirizine (ZYRTEC) 10 MG Tab Take 1 Tab by mouth every day. 90 Tab 2   • lisinopril-hydrochlorothiazide (PRINZIDE) 20-25 MG per tablet TAKE ONE TABLET BY MOUTH DAILY 90 Tab 2   • amLODIPine (NORVASC) 2.5 MG Tab Take 1 Tab by mouth every day. 90 Tab 2   • albuterol 108 (90 Base) MCG/ACT Aero Soln inhalation aerosol Inhale 2 Puffs by mouth every 6 hours as needed for Shortness of Breath. 6.7 g 2   • cyclobenzaprine (FLEXERIL) 10 MG Tab   2   •  MG capsule   5   • busPIRone (BUSPAR) 10 MG Tab tablet   0   • meloxicam (MOBIC) 15 MG tablet TAKE ONE TABLET BY MOUTH DAILY 30 Tab 2   • hydrocodone/acetaminophen (NORCO)  MG Tab Take 1-2 Tabs by mouth every 6 hours as needed.       No current facility-administered medications for this visit.       Hospital Outpatient Visit on 01/11/2021   Component Date Value Ref Range Status   • Sodium 01/11/2021 137  135 - 145 mmol/L Final   • Potassium 01/11/2021 4.1  3.6 - 5.5 mmol/L Final   • Chloride 01/11/2021 99  96 - 112 mmol/L Final   • Co2 01/11/2021 23  20 - 33 mmol/L Final   • Anion Gap 01/11/2021 15.0  7.0 - 16.0 Final   • Glucose 01/11/2021 104* 65 - 99 mg/dL Final   • Bun 01/11/2021 20  8 - 22 mg/dL Final   • Creatinine 01/11/2021 0.96  0.50 - 1.40 mg/dL Final   • Calcium 01/11/2021 9.9  8.5 - 10.5 mg/dL Final   • AST(SGOT) 01/11/2021 23  12 - 45 U/L Final   • ALT(SGPT) 01/11/2021 22  2 - 50 U/L Final   • Alkaline Phosphatase 01/11/2021 78  30 - 99 U/L Final   • Total Bilirubin 01/11/2021 0.3  0.1 - 1.5 mg/dL  Final   • Albumin 01/11/2021 4.6  3.2 - 4.9 g/dL Final   • Total Protein 01/11/2021 7.7  6.0 - 8.2 g/dL Final   • Globulin 01/11/2021 3.1  1.9 - 3.5 g/dL Final   • A-G Ratio 01/11/2021 1.5  g/dL Final   • Cholesterol,Tot 01/11/2021 201* 100 - 199 mg/dL Final   • Triglycerides 01/11/2021 107  0 - 149 mg/dL Final   • HDL 01/11/2021 51  >=40 mg/dL Final   • LDL 01/11/2021 129* <100 mg/dL Final   • WBC 01/11/2021 9.0  4.8 - 10.8 K/uL Final   • RBC 01/11/2021 4.88  4.20 - 5.40 M/uL Final   • Hemoglobin 01/11/2021 14.8  12.0 - 16.0 g/dL Final   • Hematocrit 01/11/2021 44.0  37.0 - 47.0 % Final   • MCV 01/11/2021 90.2  81.4 - 97.8 fL Final   • MCH 01/11/2021 30.3  27.0 - 33.0 pg Final   • MCHC 01/11/2021 33.6  33.6 - 35.0 g/dL Final   • RDW 01/11/2021 45.5  35.9 - 50.0 fL Final   • Platelet Count 01/11/2021 323  164 - 446 K/uL Final   • MPV 01/11/2021 8.8* 9.0 - 12.9 fL Final   • Neutrophils-Polys 01/11/2021 42.70* 44.00 - 72.00 % Final   • Lymphocytes 01/11/2021 50.50* 22.00 - 41.00 % Final   • Monocytes 01/11/2021 4.10  0.00 - 13.40 % Final   • Eosinophils 01/11/2021 1.80  0.00 - 6.90 % Final   • Basophils 01/11/2021 0.60  0.00 - 1.80 % Final   • Immature Granulocytes 01/11/2021 0.30  0.00 - 0.90 % Final   • Nucleated RBC 01/11/2021 0.00  /100 WBC Final   • Neutrophils (Absolute) 01/11/2021 3.83  2.00 - 7.15 K/uL Final    Includes immature neutrophils, if present.   • Lymphs (Absolute) 01/11/2021 4.53  1.00 - 4.80 K/uL Final   • Monos (Absolute) 01/11/2021 0.37  0.00 - 0.85 K/uL Final   • Eos (Absolute) 01/11/2021 0.16  0.00 - 0.51 K/uL Final   • Baso (Absolute) 01/11/2021 0.05  0.00 - 0.12 K/uL Final   • Immature Granulocytes (abs) 01/11/2021 0.03  0.00 - 0.11 K/uL Final   • NRBC (Absolute) 01/11/2021 0.00  K/uL Final   • Glycohemoglobin 01/11/2021 5.5  0.0 - 5.6 % Final    Comment: Increased risk for diabetes:  5.7 -6.4%  Diabetes:  >6.4%  Glycemic control for adults with diabetes:  <7.0%  The above interpretations are  1.49 per ADA guidelines.  Diagnosis  of diabetes mellitus on the basis of elevated Hemoglobin A1c  should be confirmed by repeating the Hb A1c test.     • Est Avg Glucose 01/11/2021 111  mg/dL Final    Comment: The eAG calculation is based on the A1c-Derived Daily Glucose  (ADAG) study.  See the ADA's website for additional information.     • TSH 01/11/2021 3.550  0.380 - 5.330 uIU/mL Final    Comment: Please note new reference ranges effective 12/14/2017 10:00 AM  Pregnant Females, 1st Trimester  0.050-3.700  Pregnant Females, 2nd Trimester  0.310-4.350  Pregnant Females, 3rd Trimester  0.410-5.180     • Free T-4 01/11/2021 0.96  0.93 - 1.70 ng/dL Final    Please note new FT4 reference range effective 4/29/2020.   • GFR If  01/11/2021 >60  >60 mL/min/1.73 m 2 Final   • GFR If Non  01/11/2021 59* >60 mL/min/1.73 m 2 Final   Hospital Outpatient Visit on 01/11/2021   Component Date Value Ref Range Status   • Cholesterol,Tot 01/11/2021 202* 100 - 199 mg/dL Final   • Triglycerides 01/11/2021 104  0 - 149 mg/dL Final   • HDL 01/11/2021 50  >=40 mg/dL Final   • LDL 01/11/2021 131* <100 mg/dL Final   • GFR If  01/11/2021 >60  >60 mL/min/1.73 m 2 Final   • GFR If Non  01/11/2021 >60  >60 mL/min/1.73 m 2 Final   • Sodium 01/11/2021 138  135 - 145 mmol/L Final   • Potassium 01/11/2021 4.8  3.6 - 5.5 mmol/L Final   • Chloride 01/11/2021 98  96 - 112 mmol/L Final   • Co2 01/11/2021 24  20 - 33 mmol/L Final   • Anion Gap 01/11/2021 16.0  7.0 - 16.0 Final   • Glucose 01/11/2021 101* 65 - 99 mg/dL Final   • Bun 01/11/2021 19  8 - 22 mg/dL Final   • Creatinine 01/11/2021 0.90  0.50 - 1.40 mg/dL Final   • Calcium 01/11/2021 10.1  8.5 - 10.5 mg/dL Final   • AST(SGOT) 01/11/2021 21  12 - 45 U/L Final   • ALT(SGPT) 01/11/2021 24  2 - 50 U/L Final   • Alkaline Phosphatase 01/11/2021 76  30 - 99 U/L Final   • Total Bilirubin 01/11/2021 0.3  0.1 - 1.5 mg/dL Final   • Albumin  01/11/2021 4.6  3.2 - 4.9 g/dL Final   • Total Protein 01/11/2021 7.7  6.0 - 8.2 g/dL Final   • Globulin 01/11/2021 3.1  1.9 - 3.5 g/dL Final   • A-G Ratio 01/11/2021 1.5  g/dL Final   • WBC 01/11/2021 8.7  4.8 - 10.8 K/uL Final   • RBC 01/11/2021 4.97  4.20 - 5.40 M/uL Final   • Hemoglobin 01/11/2021 14.7  12.0 - 16.0 g/dL Final   • Hematocrit 01/11/2021 45.2  37.0 - 47.0 % Final   • MCV 01/11/2021 90.9  81.4 - 97.8 fL Final   • MCH 01/11/2021 29.6  27.0 - 33.0 pg Final   • MCHC 01/11/2021 32.5* 33.6 - 35.0 g/dL Final   • RDW 01/11/2021 46.5  35.9 - 50.0 fL Final   • Platelet Count 01/11/2021 339  164 - 446 K/uL Final   • MPV 01/11/2021 9.1  9.0 - 12.9 fL Final   • Neutrophils-Polys 01/11/2021 42.50* 44.00 - 72.00 % Final   • Lymphocytes 01/11/2021 49.70* 22.00 - 41.00 % Final   • Monocytes 01/11/2021 4.70  0.00 - 13.40 % Final   • Eosinophils 01/11/2021 2.10  0.00 - 6.90 % Final   • Basophils 01/11/2021 0.50  0.00 - 1.80 % Final   • Immature Granulocytes 01/11/2021 0.50  0.00 - 0.90 % Final   • Nucleated RBC 01/11/2021 0.20  /100 WBC Final   • Neutrophils (Absolute) 01/11/2021 3.72  2.00 - 7.15 K/uL Final    Includes immature neutrophils, if present.   • Lymphs (Absolute) 01/11/2021 4.34  1.00 - 4.80 K/uL Final   • Monos (Absolute) 01/11/2021 0.41  0.00 - 0.85 K/uL Final   • Eos (Absolute) 01/11/2021 0.18  0.00 - 0.51 K/uL Final   • Baso (Absolute) 01/11/2021 0.04  0.00 - 0.12 K/uL Final   • Immature Granulocytes (abs) 01/11/2021 0.04  0.00 - 0.11 K/uL Final   • NRBC (Absolute) 01/11/2021 0.02  K/uL Final   • Hepatitis C Antibody 01/11/2021 Non-Reactive  Non-Reactive Final    Comment: Antibodies to HCV were not detected.  The Roche anti-HCV is a diagnostic test for the qualitative determination of  antibodies to the hepatitis C virus in human serum and plasma. The results  should be used and interpreted only in the context of the overall clinical  picture. A negative test result does not exclude the possibility of  exposure  to hepatitis C virus.  Note: Assay performance characteristics have not been established in  populations of immunocompromised or immunosuppressed patients.     • 25-Hydroxy   Vitamin D 25 2021 38  30 - 100 ng/mL Final    Comment: Adult Ranges:   <20 ng/mL - Deficiency  20-29 ng/mL - Insufficiency   ng/mL - Sufficiency  Effective 3/18/2020, this electrochemiluminescence binding assay is  performed using Roche caorl e immunoassay analyzer.  The Ele[employer] Vitamin D  total II assay is intended for the quantitative determination of total 25  hydroxyvitamin D in human serum and plasma. This assay is to be used as an  aid in the assessment of vitamin D sufficiency in adults.     ]    The 10-year ASCVD risk score (Riddle GALO Jr., et al., 2013) is: 5.6%    Values used to calculate the score:      Age: 61 years      Sex: Female      Is Non- : No      Diabetic: No      Tobacco smoker: No      Systolic Blood Pressure: 140 mmHg      Is BP treated: Yes      HDL Cholesterol: 51 mg/dL      Total Cholesterol: 201 mg/dL    Social History     Tobacco Use   • Smoking status: Former Smoker     Packs/day: 1.00     Years: 35.00     Pack years: 35.00     Types: Cigarettes     Quit date: 2018     Years since quittin.8   • Smokeless tobacco: Never Used   Substance Use Topics   • Alcohol use: Yes     Comment: 1 glass wine/mo   • Drug use: No     Comment: 12 years clean from methamphetamines       Family Status   Relation Name Status   • Mo  Alive   • Fa  Alive   • Sis  Alive   • Bro  Alive   • Bro  Alive   • Bro  Alive     Family History   Problem Relation Age of Onset   • Lung Disease Mother    • Cancer Father    • Heart Disease Father    • Lung Disease Brother    • Lung Disease Brother        Patient's last menstrual period was 2010 (within months).  Patient is postmenopausal.    Health Maintenance Summary                IMM HEP B VACCINE Overdue 10/29/1979     MAMMOGRAM Overdue  "10/29/2000     IMM ZOSTER VACCINES Overdue 9/25/2018      Done 7/31/2018 Imm Admin: Zoster Vaccine Recombinant (RZV) (SHINGRIX)    PAP SMEAR Overdue 6/27/2020      Done 6/27/2017 THINPREP PAP W/HPV AND CTNG     Patient has more history with this topic...    COLON CANCER SCREENING ANNUAL FIT Overdue 9/17/2020      Done 9/17/2019 OCCULT BLOOD FECES IMMUNOASSAY     Patient has more history with this topic...           Review of Systems:   Constitutional: Negative for fever, chills, weight change, fatigue, loss of appetite.  HNT: Negative for nosebleeds, congestion, odynophagia, sore throat or changes in taste.    Eyes: Negative for vision changes.   Ears: Negative for recent changes in hearing, pain or discharge.  Neck: Negative for pain, swelling, lumps or goiter.  Respiratory: Negative for cough, sputum production, shortness of breath and wheezing.    Cardiovascular: Negative for chest pain, palpitations, orthopnea and leg swelling.   Gastrointestinal: Negative for constipation, diarrhea, heartburn, dysphagia, nausea, vomiting or abdominal pain.   Genitourinary: Negative for dysuria, urgency and frequency.   Musculoskeletal: Negative for myalgias, joint pain, and back pain.  Skin: Negative for skin, hair or nail changes, rash, itching.   Neurological: Negative for dizziness, tingling, tremors, sensory change, gait/coordination changes, focal weakness and headaches.   Endo/Heme/Allergies: Does not bruise/bleed easily.   Psychiatric/Behavioral: Negative for depression, suicidal ideas and memory loss.  The patient is not nervous/anxious and does not have insomnia.        Exam:  /84 (BP Location: Left arm, Patient Position: Sitting, BP Cuff Size: Large adult)   Pulse 86   Temp 36.6 °C (97.8 °F) (Temporal)   Resp 18   Ht 1.626 m (5' 4\")   Wt 105 kg (231 lb)   SpO2 97%  Body mass index is 39.65 kg/m².  General:  Well nourished, obese, well developed female. No apparent distress.  Eyes: EOM intact, PERRL, " conjunctiva non-injected, sclera non-icteric.  Ears: Rula pinnae, external auditory canals, TM pearly gray with normal light reflex bilaterally.  Neck: Supple with no cervical lymphadenopathy, JVD, palpable thyroid nodules or carotid bruits.  Pulmonary: Clear to ausculation bilaterally. Normal effort. No rales, ronchi, or wheezing.  Cardiovascular: Regular rate and rhythm without murmur, rub or gallop.   Extremities: Full range of motion in major joints. Warm and well perfused with no edema.  Skin: Intact with no obvious rashes or lesions.  Neuro: Cranial nerves I-XII intact.  Psych: Alert and oriented x 3.  Appropriately dressed. Mood and affect appropriate.      Assessment/Plan:  1. Immunization due  Zoster Vac Recomb Adjuvanted (SHINGRIX) 50 MCG/0.5ML Recon Susp   2. Gastroesophageal reflux disease without esophagitis  omeprazole (PRILOSEC) 20 MG delayed-release capsule   3. Healthcare maintenance     4. Prediabetes     5. Mild hyperlipidemia     6. Essential hypertension     7. Screening for colorectal cancer  OCCULT BLOOD FECES IMMUNOASSAY (FIT)       Reviewed risks and benefits of treatment plan. Patient verbally agrees to plan of care.     Return in about 6 weeks (around 3/24/2021) for Well woman exam with pap.    Please note that this dictation was created using voice recognition software. I have made every reasonable attempt to correct obvious errors, but I expect that there are errors of grammar and possibly content that I did not discover before finalizing the note.

## 2025-01-29 LAB
CULTURE RESULTS: NO GROWTH — SIGNIFICANT CHANGE UP
SPECIMEN SOURCE: SIGNIFICANT CHANGE UP

## 2025-02-19 ENCOUNTER — EMERGENCY (EMERGENCY)
Facility: HOSPITAL | Age: 89
LOS: 1 days | Discharge: HOME | End: 2025-02-19
Attending: EMERGENCY MEDICINE
Payer: MEDICARE

## 2025-02-19 VITALS
DIASTOLIC BLOOD PRESSURE: 94 MMHG | WEIGHT: 100.09 LBS | OXYGEN SATURATION: 95 % | HEIGHT: 64 IN | TEMPERATURE: 98 F | RESPIRATION RATE: 16 BRPM | SYSTOLIC BLOOD PRESSURE: 128 MMHG | HEART RATE: 86 BPM

## 2025-02-19 VITALS
SYSTOLIC BLOOD PRESSURE: 158 MMHG | DIASTOLIC BLOOD PRESSURE: 83 MMHG | RESPIRATION RATE: 16 BRPM | OXYGEN SATURATION: 98 % | HEART RATE: 70 BPM

## 2025-02-19 LAB
ALBUMIN SERPL ELPH-MCNC: 3.5 G/DL — SIGNIFICANT CHANGE UP (ref 3.3–5)
ALBUMIN SERPL ELPH-MCNC: 3.8 G/DL — SIGNIFICANT CHANGE UP (ref 3.3–5)
ALP SERPL-CCNC: 67 U/L — SIGNIFICANT CHANGE UP (ref 40–120)
ALP SERPL-CCNC: 79 U/L — SIGNIFICANT CHANGE UP (ref 40–120)
ALT FLD-CCNC: 17 U/L — SIGNIFICANT CHANGE UP (ref 10–45)
ALT FLD-CCNC: 19 U/L — SIGNIFICANT CHANGE UP (ref 10–45)
ANION GAP SERPL CALC-SCNC: 12 MMOL/L — SIGNIFICANT CHANGE UP (ref 5–17)
ANION GAP SERPL CALC-SCNC: 13 MMOL/L — SIGNIFICANT CHANGE UP (ref 5–17)
APPEARANCE UR: CLEAR — SIGNIFICANT CHANGE UP
APTT BLD: 28.3 SEC — SIGNIFICANT CHANGE UP (ref 24.5–35.6)
APTT BLD: 31.5 SEC — SIGNIFICANT CHANGE UP (ref 24.5–35.6)
AST SERPL-CCNC: 26 U/L — SIGNIFICANT CHANGE UP (ref 10–40)
AST SERPL-CCNC: 36 U/L — SIGNIFICANT CHANGE UP (ref 10–40)
BACTERIA # UR AUTO: NEGATIVE /HPF — SIGNIFICANT CHANGE UP
BASOPHILS # BLD AUTO: 0.03 K/UL — SIGNIFICANT CHANGE UP (ref 0–0.2)
BASOPHILS # BLD AUTO: 0.03 K/UL — SIGNIFICANT CHANGE UP (ref 0–0.2)
BASOPHILS NFR BLD AUTO: 0.2 % — SIGNIFICANT CHANGE UP (ref 0–2)
BASOPHILS NFR BLD AUTO: 0.3 % — SIGNIFICANT CHANGE UP (ref 0–2)
BILIRUB SERPL-MCNC: 0.5 MG/DL — SIGNIFICANT CHANGE UP (ref 0.2–1.2)
BILIRUB SERPL-MCNC: 0.5 MG/DL — SIGNIFICANT CHANGE UP (ref 0.2–1.2)
BILIRUB UR-MCNC: NEGATIVE — SIGNIFICANT CHANGE UP
BUN SERPL-MCNC: 20 MG/DL — SIGNIFICANT CHANGE UP (ref 7–23)
BUN SERPL-MCNC: 23 MG/DL — SIGNIFICANT CHANGE UP (ref 7–23)
CALCIUM SERPL-MCNC: 7.8 MG/DL — LOW (ref 8.4–10.5)
CALCIUM SERPL-MCNC: 8.8 MG/DL — SIGNIFICANT CHANGE UP (ref 8.4–10.5)
CAST: 0 /LPF — SIGNIFICANT CHANGE UP (ref 0–4)
CHLORIDE SERPL-SCNC: 106 MMOL/L — SIGNIFICANT CHANGE UP (ref 96–108)
CHLORIDE SERPL-SCNC: 108 MMOL/L — SIGNIFICANT CHANGE UP (ref 96–108)
CO2 SERPL-SCNC: 17 MMOL/L — LOW (ref 22–31)
CO2 SERPL-SCNC: 21 MMOL/L — LOW (ref 22–31)
COLOR SPEC: YELLOW — SIGNIFICANT CHANGE UP
CREAT SERPL-MCNC: 0.76 MG/DL — SIGNIFICANT CHANGE UP (ref 0.5–1.3)
CREAT SERPL-MCNC: 0.89 MG/DL — SIGNIFICANT CHANGE UP (ref 0.5–1.3)
DIFF PNL FLD: ABNORMAL
EGFR: 62 ML/MIN/1.73M2 — SIGNIFICANT CHANGE UP
EGFR: 62 ML/MIN/1.73M2 — SIGNIFICANT CHANGE UP
EGFR: 75 ML/MIN/1.73M2 — SIGNIFICANT CHANGE UP
EGFR: 75 ML/MIN/1.73M2 — SIGNIFICANT CHANGE UP
EOSINOPHIL # BLD AUTO: 0.05 K/UL — SIGNIFICANT CHANGE UP (ref 0–0.5)
EOSINOPHIL # BLD AUTO: 0.06 K/UL — SIGNIFICANT CHANGE UP (ref 0–0.5)
EOSINOPHIL NFR BLD AUTO: 0.4 % — SIGNIFICANT CHANGE UP (ref 0–6)
EOSINOPHIL NFR BLD AUTO: 0.5 % — SIGNIFICANT CHANGE UP (ref 0–6)
GAS PNL BLDV: SIGNIFICANT CHANGE UP
GLUCOSE SERPL-MCNC: 112 MG/DL — HIGH (ref 70–99)
GLUCOSE SERPL-MCNC: 93 MG/DL — SIGNIFICANT CHANGE UP (ref 70–99)
GLUCOSE UR QL: NEGATIVE MG/DL — SIGNIFICANT CHANGE UP
HCT VFR BLD CALC: 37.1 % — SIGNIFICANT CHANGE UP (ref 34.5–45)
HCT VFR BLD CALC: 41 % — SIGNIFICANT CHANGE UP (ref 34.5–45)
HGB BLD-MCNC: 12 G/DL — SIGNIFICANT CHANGE UP (ref 11.5–15.5)
HGB BLD-MCNC: 13 G/DL — SIGNIFICANT CHANGE UP (ref 11.5–15.5)
IMM GRANULOCYTES NFR BLD AUTO: 0.6 % — SIGNIFICANT CHANGE UP (ref 0–0.9)
IMM GRANULOCYTES NFR BLD AUTO: 0.8 % — SIGNIFICANT CHANGE UP (ref 0–0.9)
INR BLD: 1.02 RATIO — SIGNIFICANT CHANGE UP (ref 0.85–1.16)
INR BLD: 1.1 RATIO — SIGNIFICANT CHANGE UP (ref 0.85–1.16)
KETONES UR-MCNC: ABNORMAL MG/DL
LEUKOCYTE ESTERASE UR-ACNC: ABNORMAL
LYMPHOCYTES # BLD AUTO: 0.27 K/UL — LOW (ref 1–3.3)
LYMPHOCYTES # BLD AUTO: 0.38 K/UL — LOW (ref 1–3.3)
LYMPHOCYTES # BLD AUTO: 2.1 % — LOW (ref 13–44)
LYMPHOCYTES # BLD AUTO: 3.2 % — LOW (ref 13–44)
MCHC RBC-ENTMCNC: 31.5 PG — SIGNIFICANT CHANGE UP (ref 27–34)
MCHC RBC-ENTMCNC: 31.7 G/DL — LOW (ref 32–36)
MCHC RBC-ENTMCNC: 32.3 G/DL — SIGNIFICANT CHANGE UP (ref 32–36)
MCHC RBC-ENTMCNC: 32.3 PG — SIGNIFICANT CHANGE UP (ref 27–34)
MCV RBC AUTO: 99.3 FL — SIGNIFICANT CHANGE UP (ref 80–100)
MCV RBC AUTO: 99.7 FL — SIGNIFICANT CHANGE UP (ref 80–100)
MONOCYTES # BLD AUTO: 0.24 K/UL — SIGNIFICANT CHANGE UP (ref 0–0.9)
MONOCYTES # BLD AUTO: 0.29 K/UL — SIGNIFICANT CHANGE UP (ref 0–0.9)
MONOCYTES NFR BLD AUTO: 1.8 % — LOW (ref 2–14)
MONOCYTES NFR BLD AUTO: 2.5 % — SIGNIFICANT CHANGE UP (ref 2–14)
NEUTROPHILS # BLD AUTO: 11.01 K/UL — HIGH (ref 1.8–7.4)
NEUTROPHILS # BLD AUTO: 12.29 K/UL — HIGH (ref 1.8–7.4)
NEUTROPHILS NFR BLD AUTO: 93 % — HIGH (ref 43–77)
NEUTROPHILS NFR BLD AUTO: 94.6 % — HIGH (ref 43–77)
NITRITE UR-MCNC: NEGATIVE — SIGNIFICANT CHANGE UP
NRBC BLD AUTO-RTO: 0 /100 WBCS — SIGNIFICANT CHANGE UP (ref 0–0)
NRBC BLD AUTO-RTO: 0 /100 WBCS — SIGNIFICANT CHANGE UP (ref 0–0)
PH UR: 5.5 — SIGNIFICANT CHANGE UP (ref 5–8)
PLATELET # BLD AUTO: 145 K/UL — LOW (ref 150–400)
PLATELET # BLD AUTO: 150 K/UL — SIGNIFICANT CHANGE UP (ref 150–400)
POTASSIUM SERPL-MCNC: 5.1 MMOL/L — SIGNIFICANT CHANGE UP (ref 3.5–5.3)
POTASSIUM SERPL-MCNC: 5.8 MMOL/L — HIGH (ref 3.5–5.3)
POTASSIUM SERPL-SCNC: 5.1 MMOL/L — SIGNIFICANT CHANGE UP (ref 3.5–5.3)
POTASSIUM SERPL-SCNC: 5.8 MMOL/L — HIGH (ref 3.5–5.3)
PROT SERPL-MCNC: 6.3 G/DL — SIGNIFICANT CHANGE UP (ref 6–8.3)
PROT SERPL-MCNC: 7 G/DL — SIGNIFICANT CHANGE UP (ref 6–8.3)
PROT UR-MCNC: 30 MG/DL
PROTHROM AB SERPL-ACNC: 11.7 SEC — SIGNIFICANT CHANGE UP (ref 9.9–13.4)
PROTHROM AB SERPL-ACNC: 12.5 SEC — SIGNIFICANT CHANGE UP (ref 9.9–13.4)
RBC # BLD: 3.72 M/UL — LOW (ref 3.8–5.2)
RBC # BLD: 4.13 M/UL — SIGNIFICANT CHANGE UP (ref 3.8–5.2)
RBC # FLD: 12.6 % — SIGNIFICANT CHANGE UP (ref 10.3–14.5)
RBC # FLD: 12.7 % — SIGNIFICANT CHANGE UP (ref 10.3–14.5)
RBC CASTS # UR COMP ASSIST: 5 /HPF — HIGH (ref 0–4)
SODIUM SERPL-SCNC: 135 MMOL/L — SIGNIFICANT CHANGE UP (ref 135–145)
SODIUM SERPL-SCNC: 142 MMOL/L — SIGNIFICANT CHANGE UP (ref 135–145)
SP GR SPEC: >1.03 — HIGH (ref 1–1.03)
SQUAMOUS # UR AUTO: 4 /HPF — SIGNIFICANT CHANGE UP (ref 0–5)
TROPONIN T, HIGH SENSITIVITY RESULT: 18 NG/L — SIGNIFICANT CHANGE UP (ref 0–51)
UROBILINOGEN FLD QL: 0.2 MG/DL — SIGNIFICANT CHANGE UP (ref 0.2–1)
WBC # BLD: 11.83 K/UL — HIGH (ref 3.8–10.5)
WBC # BLD: 12.99 K/UL — HIGH (ref 3.8–10.5)
WBC # FLD AUTO: 11.83 K/UL — HIGH (ref 3.8–10.5)
WBC # FLD AUTO: 12.99 K/UL — HIGH (ref 3.8–10.5)
WBC UR QL: 25 /HPF — HIGH (ref 0–5)

## 2025-02-19 PROCEDURE — 84132 ASSAY OF SERUM POTASSIUM: CPT

## 2025-02-19 PROCEDURE — 70498 CT ANGIOGRAPHY NECK: CPT | Mod: MC

## 2025-02-19 PROCEDURE — 85014 HEMATOCRIT: CPT

## 2025-02-19 PROCEDURE — 84295 ASSAY OF SERUM SODIUM: CPT

## 2025-02-19 PROCEDURE — 81001 URINALYSIS AUTO W/SCOPE: CPT

## 2025-02-19 PROCEDURE — 82435 ASSAY OF BLOOD CHLORIDE: CPT

## 2025-02-19 PROCEDURE — 70450 CT HEAD/BRAIN W/O DYE: CPT | Mod: MC

## 2025-02-19 PROCEDURE — 99285 EMERGENCY DEPT VISIT HI MDM: CPT | Mod: 25

## 2025-02-19 PROCEDURE — 85018 HEMOGLOBIN: CPT

## 2025-02-19 PROCEDURE — 85730 THROMBOPLASTIN TIME PARTIAL: CPT

## 2025-02-19 PROCEDURE — 82330 ASSAY OF CALCIUM: CPT

## 2025-02-19 PROCEDURE — 70450 CT HEAD/BRAIN W/O DYE: CPT | Mod: 26,XU

## 2025-02-19 PROCEDURE — 84484 ASSAY OF TROPONIN QUANT: CPT

## 2025-02-19 PROCEDURE — 82962 GLUCOSE BLOOD TEST: CPT

## 2025-02-19 PROCEDURE — 85025 COMPLETE CBC W/AUTO DIFF WBC: CPT

## 2025-02-19 PROCEDURE — 0042T: CPT

## 2025-02-19 PROCEDURE — 80053 COMPREHEN METABOLIC PANEL: CPT

## 2025-02-19 PROCEDURE — 70496 CT ANGIOGRAPHY HEAD: CPT | Mod: MC

## 2025-02-19 PROCEDURE — 82803 BLOOD GASES ANY COMBINATION: CPT

## 2025-02-19 PROCEDURE — 93005 ELECTROCARDIOGRAM TRACING: CPT

## 2025-02-19 PROCEDURE — 85610 PROTHROMBIN TIME: CPT

## 2025-02-19 PROCEDURE — 99283 EMERGENCY DEPT VISIT LOW MDM: CPT | Mod: GC

## 2025-02-19 PROCEDURE — 70498 CT ANGIOGRAPHY NECK: CPT | Mod: 26

## 2025-02-19 PROCEDURE — 99285 EMERGENCY DEPT VISIT HI MDM: CPT

## 2025-02-19 PROCEDURE — 36000 PLACE NEEDLE IN VEIN: CPT | Mod: XU

## 2025-02-19 PROCEDURE — 82947 ASSAY GLUCOSE BLOOD QUANT: CPT

## 2025-02-19 PROCEDURE — 87086 URINE CULTURE/COLONY COUNT: CPT

## 2025-02-19 PROCEDURE — 0042T: CPT | Mod: MC

## 2025-02-19 PROCEDURE — 83605 ASSAY OF LACTIC ACID: CPT

## 2025-02-19 PROCEDURE — 70496 CT ANGIOGRAPHY HEAD: CPT | Mod: 26

## 2025-02-19 RX ORDER — CEFPODOXIME PROXETIL 200 MG/1
1 TABLET, FILM COATED ORAL
Qty: 14 | Refills: 0
Start: 2025-02-19 | End: 2025-02-25

## 2025-02-19 RX ORDER — QUETIAPINE FUMARATE 25 MG/1
50 TABLET ORAL ONCE
Refills: 0 | Status: COMPLETED | OUTPATIENT
Start: 2025-02-19 | End: 2025-02-19

## 2025-02-19 RX ORDER — SERTRALINE 100 MG/1
25 TABLET, FILM COATED ORAL DAILY
Refills: 0 | Status: DISCONTINUED | OUTPATIENT
Start: 2025-02-19 | End: 2025-02-23

## 2025-02-19 RX ORDER — TRAZODONE HCL 100 MG
100 TABLET ORAL ONCE
Refills: 0 | Status: COMPLETED | OUTPATIENT
Start: 2025-02-19 | End: 2025-02-19

## 2025-02-19 RX ORDER — ALPRAZOLAM 0.5 MG
0.25 TABLET, EXTENDED RELEASE 24 HR ORAL ONCE
Refills: 0 | Status: DISCONTINUED | OUTPATIENT
Start: 2025-02-19 | End: 2025-02-19

## 2025-02-19 RX ORDER — CEFPODOXIME PROXETIL 200 MG/1
100 TABLET, FILM COATED ORAL ONCE
Refills: 0 | Status: COMPLETED | OUTPATIENT
Start: 2025-02-19 | End: 2025-02-19

## 2025-02-19 RX ADMIN — QUETIAPINE FUMARATE 50 MILLIGRAM(S): 25 TABLET ORAL at 18:52

## 2025-02-19 RX ADMIN — SERTRALINE 25 MILLIGRAM(S): 100 TABLET, FILM COATED ORAL at 19:23

## 2025-02-19 RX ADMIN — Medication 0.25 MILLIGRAM(S): at 18:51

## 2025-02-19 RX ADMIN — Medication 100 MILLIGRAM(S): at 19:27

## 2025-02-19 RX ADMIN — Medication 1000 MILLILITER(S): at 14:45

## 2025-02-19 RX ADMIN — CEFPODOXIME PROXETIL 100 MILLIGRAM(S): 200 TABLET, FILM COATED ORAL at 18:51

## 2025-02-20 LAB
CULTURE RESULTS: NO GROWTH — SIGNIFICANT CHANGE UP
SPECIMEN SOURCE: SIGNIFICANT CHANGE UP

## 2025-03-04 ENCOUNTER — EMERGENCY (EMERGENCY)
Facility: HOSPITAL | Age: 89
LOS: 1 days | Discharge: HOME | End: 2025-03-04
Attending: EMERGENCY MEDICINE
Payer: MEDICARE

## 2025-03-04 VITALS
WEIGHT: 100.09 LBS | HEIGHT: 64 IN | HEART RATE: 87 BPM | SYSTOLIC BLOOD PRESSURE: 151 MMHG | OXYGEN SATURATION: 94 % | TEMPERATURE: 98 F | RESPIRATION RATE: 16 BRPM | DIASTOLIC BLOOD PRESSURE: 86 MMHG

## 2025-03-04 LAB
APPEARANCE UR: ABNORMAL
BACTERIA # UR AUTO: NEGATIVE /HPF — SIGNIFICANT CHANGE UP
BASOPHILS # BLD AUTO: 0.07 K/UL — SIGNIFICANT CHANGE UP (ref 0–0.2)
BASOPHILS NFR BLD AUTO: 0.8 % — SIGNIFICANT CHANGE UP (ref 0–2)
BILIRUB UR-MCNC: NEGATIVE — SIGNIFICANT CHANGE UP
CAST: 0 /LPF — SIGNIFICANT CHANGE UP (ref 0–4)
COLOR SPEC: YELLOW — SIGNIFICANT CHANGE UP
DIFF PNL FLD: NEGATIVE — SIGNIFICANT CHANGE UP
EOSINOPHIL # BLD AUTO: 0.45 K/UL — SIGNIFICANT CHANGE UP (ref 0–0.5)
EOSINOPHIL NFR BLD AUTO: 5.4 % — SIGNIFICANT CHANGE UP (ref 0–6)
FLUAV AG NPH QL: SIGNIFICANT CHANGE UP
FLUBV AG NPH QL: SIGNIFICANT CHANGE UP
GLUCOSE UR QL: NEGATIVE MG/DL — SIGNIFICANT CHANGE UP
HCT VFR BLD CALC: 38.5 % — SIGNIFICANT CHANGE UP (ref 34.5–45)
HGB BLD-MCNC: 12.6 G/DL — SIGNIFICANT CHANGE UP (ref 11.5–15.5)
IMM GRANULOCYTES NFR BLD AUTO: 0.4 % — SIGNIFICANT CHANGE UP (ref 0–0.9)
KETONES UR-MCNC: NEGATIVE MG/DL — SIGNIFICANT CHANGE UP
LEUKOCYTE ESTERASE UR-ACNC: ABNORMAL
LIDOCAIN IGE QN: 55 U/L — SIGNIFICANT CHANGE UP (ref 7–60)
LYMPHOCYTES # BLD AUTO: 1.57 K/UL — SIGNIFICANT CHANGE UP (ref 1–3.3)
LYMPHOCYTES # BLD AUTO: 18.9 % — SIGNIFICANT CHANGE UP (ref 13–44)
MAGNESIUM SERPL-MCNC: 2.3 MG/DL — SIGNIFICANT CHANGE UP (ref 1.6–2.6)
MCHC RBC-ENTMCNC: 32.1 PG — SIGNIFICANT CHANGE UP (ref 27–34)
MCHC RBC-ENTMCNC: 32.7 G/DL — SIGNIFICANT CHANGE UP (ref 32–36)
MCV RBC AUTO: 98.2 FL — SIGNIFICANT CHANGE UP (ref 80–100)
MONOCYTES # BLD AUTO: 0.78 K/UL — SIGNIFICANT CHANGE UP (ref 0–0.9)
MONOCYTES NFR BLD AUTO: 9.4 % — SIGNIFICANT CHANGE UP (ref 2–14)
NEUTROPHILS # BLD AUTO: 5.4 K/UL — SIGNIFICANT CHANGE UP (ref 1.8–7.4)
NEUTROPHILS NFR BLD AUTO: 65.1 % — SIGNIFICANT CHANGE UP (ref 43–77)
NITRITE UR-MCNC: NEGATIVE — SIGNIFICANT CHANGE UP
NRBC BLD AUTO-RTO: 0 /100 WBCS — SIGNIFICANT CHANGE UP (ref 0–0)
PH UR: 6.5 — SIGNIFICANT CHANGE UP (ref 5–8)
PHOSPHATE SERPL-MCNC: 3.7 MG/DL — SIGNIFICANT CHANGE UP (ref 2.5–4.5)
PLATELET # BLD AUTO: 196 K/UL — SIGNIFICANT CHANGE UP (ref 150–400)
PROCALCITONIN SERPL-MCNC: 0.04 NG/ML — SIGNIFICANT CHANGE UP (ref 0.02–0.1)
PROT UR-MCNC: NEGATIVE MG/DL — SIGNIFICANT CHANGE UP
RBC # BLD: 3.92 M/UL — SIGNIFICANT CHANGE UP (ref 3.8–5.2)
RBC # FLD: 12.5 % — SIGNIFICANT CHANGE UP (ref 10.3–14.5)
RBC CASTS # UR COMP ASSIST: 2 /HPF — SIGNIFICANT CHANGE UP (ref 0–4)
RSV RNA NPH QL NAA+NON-PROBE: SIGNIFICANT CHANGE UP
SARS-COV-2 RNA SPEC QL NAA+PROBE: SIGNIFICANT CHANGE UP
SP GR SPEC: 1.02 — SIGNIFICANT CHANGE UP (ref 1–1.03)
SQUAMOUS # UR AUTO: 3 /HPF — SIGNIFICANT CHANGE UP (ref 0–5)
UROBILINOGEN FLD QL: 0.2 MG/DL — SIGNIFICANT CHANGE UP (ref 0.2–1)
WBC # BLD: 8.3 K/UL — SIGNIFICANT CHANGE UP (ref 3.8–10.5)
WBC # FLD AUTO: 8.3 K/UL — SIGNIFICANT CHANGE UP (ref 3.8–10.5)
WBC UR QL: 20 /HPF — HIGH (ref 0–5)

## 2025-03-04 PROCEDURE — 87637 SARSCOV2&INF A&B&RSV AMP PRB: CPT

## 2025-03-04 PROCEDURE — 84100 ASSAY OF PHOSPHORUS: CPT

## 2025-03-04 PROCEDURE — 87086 URINE CULTURE/COLONY COUNT: CPT

## 2025-03-04 PROCEDURE — 81001 URINALYSIS AUTO W/SCOPE: CPT

## 2025-03-04 PROCEDURE — 80053 COMPREHEN METABOLIC PANEL: CPT

## 2025-03-04 PROCEDURE — 83690 ASSAY OF LIPASE: CPT

## 2025-03-04 PROCEDURE — 85025 COMPLETE CBC W/AUTO DIFF WBC: CPT

## 2025-03-04 PROCEDURE — 84145 PROCALCITONIN (PCT): CPT

## 2025-03-04 PROCEDURE — 99285 EMERGENCY DEPT VISIT HI MDM: CPT

## 2025-03-04 PROCEDURE — 83735 ASSAY OF MAGNESIUM: CPT

## 2025-03-04 PROCEDURE — 99285 EMERGENCY DEPT VISIT HI MDM: CPT | Mod: 25

## 2025-03-04 PROCEDURE — 93005 ELECTROCARDIOGRAM TRACING: CPT

## 2025-03-04 PROCEDURE — 36415 COLL VENOUS BLD VENIPUNCTURE: CPT

## 2025-03-04 RX ORDER — QUETIAPINE FUMARATE 25 MG/1
12.5 TABLET ORAL ONCE
Refills: 0 | Status: COMPLETED | OUTPATIENT
Start: 2025-03-04 | End: 2025-03-04

## 2025-03-04 NOTE — ED ADULT NURSE NOTE - OBJECTIVE STATEMENT
Pt is a 88 y/o female PMH afib (on Xeralto), CAD, CHF, HTN, LD, anxiety/insomnia and recurrent UTIs presents to the ED c/o abdominal pain. Pt Pt is a 88 y/o female PMH afib (on Xeralto), CAD, CHF, HTN, LD, anxiety/insomnia and recurrent UTIs presents to the ED c/o abdominal pain. Pt BIBEMS from the [facility], per EMS, they were called because the pt was c/o abdominal pain. EMS was not given any more information on the pt but the pt is denying abdominal pain. Pt is AxO x1 (only aware of her name). The daughter was contacted and awaiting to hear back. Pt denies chest pain, SOB, trauma/falls, difficulty swallowing or eating, HA/vision changes or GI/ symptoms.

## 2025-03-04 NOTE — ED PROVIDER NOTE - ATTENDING CONTRIBUTION TO CARE
Attending MD James:  I have seen and examined this patient and fully participated in the care of this patient as the teaching attending. I personally made/approved the management plan and take responsibility for the patient management.       89-year-old woman presenting from nursing facility with report of upper abdominal pain and possible tenderness in the lower abdomen.  The patient herself states that she feels fine and denies abdominal pain to me.  Chart review reveals history of dementia oriented to self only CHF A-fib on Xarelto aortic stenosis status post TAVR.    Patient's vital signs are nonactionable.  She is sitting up in the stretcher in no apparent distress.  She is breathing comfortably in room air.  Abdomen is soft nondistended there is tenderness to palpation in the mid abdomen and suprapubic region with some voluntary guarding but no rebound.    Considerations in this patient clued or not fully limited to cystitis, pyelonephritis, colitis/diverticulitis, SBO will obtain abdominal CT scan labs urinalysis and reassess      *The above represents an initial assessment/impression. Please refer to progress notes for potential changes in patient clinical course*

## 2025-03-04 NOTE — ED PROVIDER NOTE - PROGRESS NOTE DETAILS
(Jen Talavera MD-PGY1): Patient remains stable and comfortable. Per nursing having some agitation and sundowning possible. Spoke with family who did not want to pursue CT or XR or further workup. Patricia Ibarra, HCP, confirms that she understands the risks of not doing a CTH and CTAP that could include infection, brain bleed, and death. She feels comfortable with not pursue additional labs or xray at this time as it would make pt more agitated and thus far labs are reassuring. She states pt sometimes sits on the ground and is brought to ER for 'falls' without injuries in the past. They do not want her care prolonged if possible and are comfortable at this point in workup for discharge home. Will hold on giving IM agitation medications and see if patient will take oral quetiapine prior to dc. Family given instructions to return for any other concerns, worsening symptoms, or if they change their mind.

## 2025-03-04 NOTE — ED PROVIDER NOTE - PHYSICAL EXAMINATION
PHYSICAL EXAM  General: Well-appearing, reclining in bed comfortably, no distress.  HEENT: Normocephalic. Atraumatic. Normal icterus. MMM.  Heart: Regular rate, rhythm. Normal S1/S2. No murmurs.   Lungs: Clear to ausculation bilaterally, no wheezes, rhonchi. Normal effort.   GI: Soft, non-distended, non-tender. No guarding or rebound.   Neuro: Alert, oriented. No obvious focal deficits.   Ext: Peripheral pulses intact. No lower extremity edema.  Skin: Warm, well-perfused

## 2025-03-04 NOTE — ED PROVIDER NOTE - NSFOLLOWUPINSTRUCTIONS_ED_ALL_ED_FT
SUMMARY  You were seen in the ER on 3/4/25 for evaluation of abdominal pain and a possible fall. Your labs were reassuring against infection and after speaking with your family, they decided it was best to have you go home and return if there are further issues or concerns. We discussed the risks and burdens of not having CT scans done and family was comfortable with those risk. You are discharging back to [facility]. Please follow the recommendations below. Thank you for allowing us to care for you!    RECOMMENDATIONS  For Pain/Aches  - You can take Acetaminophen (Tylenol) 650mg-1000mg every 6 hours as needed (max 4000mg/day)  - You can try lidocaine patches (can buy at any pharmacy without prescription)  - You can try ice/heat, massage, gentle stretching as able    SCHEDULE APPOINTMENT WITH  1. Your regular doctor if you are wanting follow up or check up or for questions    RETURN TO THE ER...  For any worsening symptoms or concerns, including chest pain, shortness of breath, lightheadedness, weakness, fever, inability to walk, severe pain, or anything else concerning.

## 2025-03-04 NOTE — ED PROVIDER NOTE - CLINICAL SUMMARY MEDICAL DECISION MAKING FREE TEXT BOX
(Jen Talavera MD-PGY1): Ms. Covarrubias is an 89yoF with a history of dementia, CAD, CHF (EF 53% 2024), Afib on Xeralto, HTN, HLD, hypothyroidism, recurrent UTIs, constipation and anxiety/insomnia who presents to the ER from [facility] for abdominal pain that began at 5pm and a fall. Patient Aox1 and denies any symptoms. She is not sure why she is here and states she was just here last week. NH staff reports she has had upper abdominal discomfort and had a fall backwards. No head strike or LOC. They think she does not have urinary symptoms, nausea, or vomiting. They have no additional history to report.     Vitals on arrival were reassuring, afebrile, not tachycardic, normal oxygen saturation.  Physical notable for alert and oriented x 1, nontoxic, RRR, clear lungs though slightly diminished effort, nontender/soft abdomen, no edema.  Nontender CTL spine, no abrasions noted on scalp.  DDx: For fall could be broad and include infectious, electrolyte, metabolic, cardiac etiologies.  For abdominal pain that patient denies, etiology is also broad given dementia.  Will obtain labs, urine, flu, EKG, and CT abdomen pelvis in addition to x-ray of hip and sacrum.  Dispo pending clinical course.  If pain is negative and reassuring, patient could discharge to nursing home, otherwise may need admission.  Will reach out to patient's daughter.  Patricia Ibarra (daughter): [phone]. Updates in progress note section.

## 2025-03-04 NOTE — ED PROVIDER NOTE - PATIENT PORTAL LINK FT
You can access the FollowMyHealth Patient Portal offered by [facility] by registering at the following website: [email or web address]. By joining [employer]’s FollowMyHealth portal, you will also be able to view your health information using other applications (apps) compatible with our system.

## 2025-03-04 NOTE — ED PROVIDER NOTE - OTHER FINDINGS
ECG recorded at 8:34 PM independently interpreted by me , Dr Eleazar James,  at 8:49 PM shows atrial fibrillation right bundle branch block some baseline artifact in multiple leads limits interpretation of ST-T segments however no gross ischemic ST-T changes

## 2025-03-05 VITALS
OXYGEN SATURATION: 94 % | SYSTOLIC BLOOD PRESSURE: 160 MMHG | TEMPERATURE: 98 F | DIASTOLIC BLOOD PRESSURE: 81 MMHG | RESPIRATION RATE: 16 BRPM | HEART RATE: 96 BPM

## 2025-03-05 LAB
CULTURE RESULTS: SIGNIFICANT CHANGE UP
SPECIMEN SOURCE: SIGNIFICANT CHANGE UP

## 2025-03-05 NOTE — ED ADULT NURSE REASSESSMENT NOTE - NS ED NURSE REASSESS COMMENT FT1
Report given to [facility] in [place] to RN. Pt transporting back - no complaints at this time.

## 2025-03-14 NOTE — PHYSICAL THERAPY INITIAL EVALUATION ADULT - PERSONAL SAFETY AND JUDGMENT, REHAB EVAL
impaired/at risk behaviors demonstrated Spray Paint Technique: No Post-Care Instructions: I reviewed with the patient in detail post-care instructions. Patient is to wear sunprotection, and avoid picking at any of the treated lesions. Pt may apply Vaseline to crusted or scabbing areas. Consent: The patient's consent was obtained including but not limited to risks of crusting, scabbing, blistering, scarring, darker or lighter pigmentary change, recurrence, incomplete removal and infection. Medical Necessity Clause: This procedure was medically necessary because the lesions that were treated were inflamed and irritated. Medical Necessity Information: It is in your best interest to select a reason for this procedure from the list below. All of these items fulfill various CMS LCD requirements except the new and changing color options. Detail Level: Detailed Spray Paint Text: The liquid nitrogen was applied to the skin utilizing a spray paint frosting technique.

## 2025-03-23 ENCOUNTER — EMERGENCY (EMERGENCY)
Facility: HOSPITAL | Age: 89
LOS: 1 days | Discharge: NURSING, REHAB OR LONG-TERM CARE | End: 2025-03-23
Attending: EMERGENCY MEDICINE
Payer: MEDICARE

## 2025-03-23 VITALS
OXYGEN SATURATION: 94 % | RESPIRATION RATE: 19 BRPM | HEIGHT: 64 IN | TEMPERATURE: 98 F | HEART RATE: 70 BPM | SYSTOLIC BLOOD PRESSURE: 145 MMHG | DIASTOLIC BLOOD PRESSURE: 86 MMHG

## 2025-03-23 VITALS
RESPIRATION RATE: 16 BRPM | OXYGEN SATURATION: 95 % | SYSTOLIC BLOOD PRESSURE: 141 MMHG | DIASTOLIC BLOOD PRESSURE: 72 MMHG | HEART RATE: 78 BPM

## 2025-03-23 LAB
ALBUMIN SERPL ELPH-MCNC: 3.7 G/DL — SIGNIFICANT CHANGE UP (ref 3.3–5)
ALP SERPL-CCNC: 71 U/L — SIGNIFICANT CHANGE UP (ref 40–120)
ALT FLD-CCNC: 16 U/L — SIGNIFICANT CHANGE UP (ref 10–45)
ANION GAP SERPL CALC-SCNC: 13 MMOL/L — SIGNIFICANT CHANGE UP (ref 5–17)
APPEARANCE UR: ABNORMAL
APTT BLD: 36.7 SEC — HIGH (ref 24.5–35.6)
AST SERPL-CCNC: 26 U/L — SIGNIFICANT CHANGE UP (ref 10–40)
BACTERIA # UR AUTO: NEGATIVE /HPF — SIGNIFICANT CHANGE UP
BASOPHILS # BLD AUTO: 0.05 K/UL — SIGNIFICANT CHANGE UP (ref 0–0.2)
BASOPHILS NFR BLD AUTO: 0.8 % — SIGNIFICANT CHANGE UP (ref 0–2)
BILIRUB SERPL-MCNC: 0.4 MG/DL — SIGNIFICANT CHANGE UP (ref 0.2–1.2)
BILIRUB UR-MCNC: NEGATIVE — SIGNIFICANT CHANGE UP
BUN SERPL-MCNC: 19 MG/DL — SIGNIFICANT CHANGE UP (ref 7–23)
CALCIUM SERPL-MCNC: 9.3 MG/DL — SIGNIFICANT CHANGE UP (ref 8.4–10.5)
CAST: 2 /LPF — SIGNIFICANT CHANGE UP (ref 0–4)
CHLORIDE SERPL-SCNC: 105 MMOL/L — SIGNIFICANT CHANGE UP (ref 96–108)
CK SERPL-CCNC: 69 U/L — SIGNIFICANT CHANGE UP (ref 25–170)
CO2 SERPL-SCNC: 23 MMOL/L — SIGNIFICANT CHANGE UP (ref 22–31)
COLOR SPEC: YELLOW — SIGNIFICANT CHANGE UP
CREAT SERPL-MCNC: 1.05 MG/DL — SIGNIFICANT CHANGE UP (ref 0.5–1.3)
DIFF PNL FLD: NEGATIVE — SIGNIFICANT CHANGE UP
EGFR: 51 ML/MIN/1.73M2 — LOW
EGFR: 51 ML/MIN/1.73M2 — LOW
EOSINOPHIL # BLD AUTO: 0.46 K/UL — SIGNIFICANT CHANGE UP (ref 0–0.5)
EOSINOPHIL NFR BLD AUTO: 7 % — HIGH (ref 0–6)
FLUAV AG NPH QL: SIGNIFICANT CHANGE UP
FLUBV AG NPH QL: SIGNIFICANT CHANGE UP
GAS PNL BLDV: SIGNIFICANT CHANGE UP
GLUCOSE SERPL-MCNC: 87 MG/DL — SIGNIFICANT CHANGE UP (ref 70–99)
GLUCOSE UR QL: NEGATIVE MG/DL — SIGNIFICANT CHANGE UP
HCT VFR BLD CALC: 38.4 % — SIGNIFICANT CHANGE UP (ref 34.5–45)
HGB BLD-MCNC: 12.5 G/DL — SIGNIFICANT CHANGE UP (ref 11.5–15.5)
IMM GRANULOCYTES NFR BLD AUTO: 0.5 % — SIGNIFICANT CHANGE UP (ref 0–0.9)
INR BLD: 1.14 RATIO — SIGNIFICANT CHANGE UP (ref 0.85–1.16)
KETONES UR-MCNC: NEGATIVE MG/DL — SIGNIFICANT CHANGE UP
LEUKOCYTE ESTERASE UR-ACNC: ABNORMAL
LIDOCAIN IGE QN: 146 U/L — HIGH (ref 7–60)
LYMPHOCYTES # BLD AUTO: 1.42 K/UL — SIGNIFICANT CHANGE UP (ref 1–3.3)
LYMPHOCYTES # BLD AUTO: 21.5 % — SIGNIFICANT CHANGE UP (ref 13–44)
MAGNESIUM SERPL-MCNC: 2.4 MG/DL — SIGNIFICANT CHANGE UP (ref 1.6–2.6)
MCHC RBC-ENTMCNC: 32.2 PG — SIGNIFICANT CHANGE UP (ref 27–34)
MCHC RBC-ENTMCNC: 32.6 G/DL — SIGNIFICANT CHANGE UP (ref 32–36)
MCV RBC AUTO: 99 FL — SIGNIFICANT CHANGE UP (ref 80–100)
MONOCYTES # BLD AUTO: 0.63 K/UL — SIGNIFICANT CHANGE UP (ref 0–0.9)
MONOCYTES NFR BLD AUTO: 9.6 % — SIGNIFICANT CHANGE UP (ref 2–14)
NEUTROPHILS # BLD AUTO: 4 K/UL — SIGNIFICANT CHANGE UP (ref 1.8–7.4)
NEUTROPHILS NFR BLD AUTO: 60.6 % — SIGNIFICANT CHANGE UP (ref 43–77)
NITRITE UR-MCNC: NEGATIVE — SIGNIFICANT CHANGE UP
NRBC BLD AUTO-RTO: 0 /100 WBCS — SIGNIFICANT CHANGE UP (ref 0–0)
NT-PROBNP SERPL-SCNC: 1958 PG/ML — HIGH (ref 0–300)
PH UR: 7 — SIGNIFICANT CHANGE UP (ref 5–8)
PLATELET # BLD AUTO: 143 K/UL — LOW (ref 150–400)
POTASSIUM SERPL-MCNC: 4.5 MMOL/L — SIGNIFICANT CHANGE UP (ref 3.5–5.3)
POTASSIUM SERPL-SCNC: 4.5 MMOL/L — SIGNIFICANT CHANGE UP (ref 3.5–5.3)
PROT SERPL-MCNC: 6.7 G/DL — SIGNIFICANT CHANGE UP (ref 6–8.3)
PROT UR-MCNC: NEGATIVE MG/DL — SIGNIFICANT CHANGE UP
PROTHROM AB SERPL-ACNC: 13.1 SEC — SIGNIFICANT CHANGE UP (ref 9.9–13.4)
RBC # BLD: 3.88 M/UL — SIGNIFICANT CHANGE UP (ref 3.8–5.2)
RBC # FLD: 12.2 % — SIGNIFICANT CHANGE UP (ref 10.3–14.5)
RBC CASTS # UR COMP ASSIST: 2 /HPF — SIGNIFICANT CHANGE UP (ref 0–4)
REVIEW: SIGNIFICANT CHANGE UP
RSV RNA NPH QL NAA+NON-PROBE: SIGNIFICANT CHANGE UP
SARS-COV-2 RNA SPEC QL NAA+PROBE: SIGNIFICANT CHANGE UP
SODIUM SERPL-SCNC: 141 MMOL/L — SIGNIFICANT CHANGE UP (ref 135–145)
SOURCE RESPIRATORY: SIGNIFICANT CHANGE UP
SP GR SPEC: 1.02 — SIGNIFICANT CHANGE UP (ref 1–1.03)
SQUAMOUS # UR AUTO: 4 /HPF — SIGNIFICANT CHANGE UP (ref 0–5)
TROPONIN T, HIGH SENSITIVITY RESULT: 19 NG/L — SIGNIFICANT CHANGE UP (ref 0–51)
TROPONIN T, HIGH SENSITIVITY RESULT: 21 NG/L — SIGNIFICANT CHANGE UP (ref 0–51)
TSH SERPL-MCNC: 2.87 UIU/ML — SIGNIFICANT CHANGE UP (ref 0.27–4.2)
UROBILINOGEN FLD QL: 0.2 MG/DL — SIGNIFICANT CHANGE UP (ref 0.2–1)
WBC # BLD: 6.59 K/UL — SIGNIFICANT CHANGE UP (ref 3.8–10.5)
WBC # FLD AUTO: 6.59 K/UL — SIGNIFICANT CHANGE UP (ref 3.8–10.5)
WBC UR QL: 11 /HPF — HIGH (ref 0–5)

## 2025-03-23 PROCEDURE — 82962 GLUCOSE BLOOD TEST: CPT

## 2025-03-23 PROCEDURE — 72125 CT NECK SPINE W/O DYE: CPT | Mod: MC

## 2025-03-23 PROCEDURE — 85730 THROMBOPLASTIN TIME PARTIAL: CPT

## 2025-03-23 PROCEDURE — 74176 CT ABD & PELVIS W/O CONTRAST: CPT | Mod: MC

## 2025-03-23 PROCEDURE — 82947 ASSAY GLUCOSE BLOOD QUANT: CPT

## 2025-03-23 PROCEDURE — 93005 ELECTROCARDIOGRAM TRACING: CPT

## 2025-03-23 PROCEDURE — 71250 CT THORAX DX C-: CPT | Mod: 26

## 2025-03-23 PROCEDURE — 82550 ASSAY OF CK (CPK): CPT

## 2025-03-23 PROCEDURE — 99285 EMERGENCY DEPT VISIT HI MDM: CPT

## 2025-03-23 PROCEDURE — 83690 ASSAY OF LIPASE: CPT

## 2025-03-23 PROCEDURE — 71045 X-RAY EXAM CHEST 1 VIEW: CPT

## 2025-03-23 PROCEDURE — 84443 ASSAY THYROID STIM HORMONE: CPT

## 2025-03-23 PROCEDURE — 70450 CT HEAD/BRAIN W/O DYE: CPT | Mod: 26

## 2025-03-23 PROCEDURE — 83880 ASSAY OF NATRIURETIC PEPTIDE: CPT

## 2025-03-23 PROCEDURE — 36415 COLL VENOUS BLD VENIPUNCTURE: CPT

## 2025-03-23 PROCEDURE — 85014 HEMATOCRIT: CPT

## 2025-03-23 PROCEDURE — 82435 ASSAY OF BLOOD CHLORIDE: CPT

## 2025-03-23 PROCEDURE — 96374 THER/PROPH/DIAG INJ IV PUSH: CPT

## 2025-03-23 PROCEDURE — 84132 ASSAY OF SERUM POTASSIUM: CPT

## 2025-03-23 PROCEDURE — 90471 IMMUNIZATION ADMIN: CPT

## 2025-03-23 PROCEDURE — 82803 BLOOD GASES ANY COMBINATION: CPT

## 2025-03-23 PROCEDURE — 74176 CT ABD & PELVIS W/O CONTRAST: CPT | Mod: 26

## 2025-03-23 PROCEDURE — 99285 EMERGENCY DEPT VISIT HI MDM: CPT | Mod: 25

## 2025-03-23 PROCEDURE — 84295 ASSAY OF SERUM SODIUM: CPT

## 2025-03-23 PROCEDURE — 71250 CT THORAX DX C-: CPT | Mod: MC

## 2025-03-23 PROCEDURE — 87086 URINE CULTURE/COLONY COUNT: CPT

## 2025-03-23 PROCEDURE — 80053 COMPREHEN METABOLIC PANEL: CPT

## 2025-03-23 PROCEDURE — 84484 ASSAY OF TROPONIN QUANT: CPT

## 2025-03-23 PROCEDURE — 82330 ASSAY OF CALCIUM: CPT

## 2025-03-23 PROCEDURE — 90715 TDAP VACCINE 7 YRS/> IM: CPT

## 2025-03-23 PROCEDURE — 85025 COMPLETE CBC W/AUTO DIFF WBC: CPT

## 2025-03-23 PROCEDURE — 87637 SARSCOV2&INF A&B&RSV AMP PRB: CPT

## 2025-03-23 PROCEDURE — 85610 PROTHROMBIN TIME: CPT

## 2025-03-23 PROCEDURE — 81001 URINALYSIS AUTO W/SCOPE: CPT

## 2025-03-23 PROCEDURE — 85018 HEMOGLOBIN: CPT

## 2025-03-23 PROCEDURE — 70450 CT HEAD/BRAIN W/O DYE: CPT | Mod: MC

## 2025-03-23 PROCEDURE — 83605 ASSAY OF LACTIC ACID: CPT

## 2025-03-23 PROCEDURE — 83735 ASSAY OF MAGNESIUM: CPT

## 2025-03-23 PROCEDURE — 71045 X-RAY EXAM CHEST 1 VIEW: CPT | Mod: 26

## 2025-03-23 PROCEDURE — 93010 ELECTROCARDIOGRAM REPORT: CPT

## 2025-03-23 PROCEDURE — 72125 CT NECK SPINE W/O DYE: CPT | Mod: 26

## 2025-03-23 RX ORDER — ACETAMINOPHEN 500 MG/5ML
650 LIQUID (ML) ORAL ONCE
Refills: 0 | Status: COMPLETED | OUTPATIENT
Start: 2025-03-23 | End: 2025-03-23

## 2025-03-23 RX ORDER — SERTRALINE 100 MG/1
150 TABLET, FILM COATED ORAL DAILY
Refills: 0 | Status: DISCONTINUED | OUTPATIENT
Start: 2025-03-23 | End: 2025-03-26

## 2025-03-23 RX ORDER — HALOPERIDOL 10 MG/1
1 TABLET ORAL ONCE
Refills: 0 | Status: COMPLETED | OUTPATIENT
Start: 2025-03-23 | End: 2025-03-23

## 2025-03-23 RX ORDER — QUETIAPINE FUMARATE 25 MG/1
12.5 TABLET ORAL ONCE
Refills: 0 | Status: COMPLETED | OUTPATIENT
Start: 2025-03-23 | End: 2025-03-23

## 2025-03-23 RX ORDER — CLOSTRIDIUM TETANI TOXOID ANTIGEN (FORMALDEHYDE INACTIVATED), CORYNEBACTERIUM DIPHTHERIAE TOXOID ANTIGEN (FORMALDEHYDE INACTIVATED), BORDETELLA PERTUSSIS TOXOID ANTIGEN (GLUTARALDEHYDE INACTIVATED), BORDETELLA PERTUSSIS FILAMENTOUS HEMAGGLUTININ ANTIGEN (FORMALDEHYDE INACTIVATED), BORDETELLA PERTUSSIS PERTACTIN ANTIGEN, AND BORDETELLA PERTUSSIS FIMBRIAE 2/3 ANTIGEN 5; 2; 2.5; 5; 3; 5 [LF]/.5ML; [LF]/.5ML; UG/.5ML; UG/.5ML; UG/.5ML; UG/.5ML
0.5 INJECTION, SUSPENSION INTRAMUSCULAR ONCE
Refills: 0 | Status: COMPLETED | OUTPATIENT
Start: 2025-03-23 | End: 2025-03-23

## 2025-03-23 RX ADMIN — HALOPERIDOL 1 MILLIGRAM(S): 10 TABLET ORAL at 12:07

## 2025-03-23 RX ADMIN — CLOSTRIDIUM TETANI TOXOID ANTIGEN (FORMALDEHYDE INACTIVATED), CORYNEBACTERIUM DIPHTHERIAE TOXOID ANTIGEN (FORMALDEHYDE INACTIVATED), BORDETELLA PERTUSSIS TOXOID ANTIGEN (GLUTARALDEHYDE INACTIVATED), BORDETELLA PERTUSSIS FILAMENTOUS HEMAGGLUTININ ANTIGEN (FORMALDEHYDE INACTIVATED), BORDETELLA PERTUSSIS PERTACTIN ANTIGEN, AND BORDETELLA PERTUSSIS FIMBRIAE 2/3 ANTIGEN 0.5 MILLILITER(S): 5; 2; 2.5; 5; 3; 5 INJECTION, SUSPENSION INTRAMUSCULAR at 10:59

## 2025-03-23 RX ADMIN — Medication 500 MILLILITER(S): at 10:39

## 2025-03-23 RX ADMIN — Medication 650 MILLIGRAM(S): at 10:36

## 2025-03-23 NOTE — ED ADULT TRIAGE NOTE - CHIEF COMPLAINT QUOTE
C/o unwitnessed fall w/ head strike. On Plavix. Unk LOC. Abrasion noted to L side of forehead. PMH dementia, at baseline

## 2025-03-23 NOTE — ED PROVIDER NOTE - PATIENT PORTAL LINK FT
You can access the FollowMyHealth Patient Portal offered by [facility] by registering at the following website: [email or web address]. By joining [employer]’s FollowMyHealth portal, you will also be able to view your health information using other applications (apps) compatible with our system.

## 2025-03-23 NOTE — ED PROVIDER NOTE - NSFOLLOWUPINSTRUCTIONS_ED_ALL_ED_FT
You were seen in the emergency department for an unwitnessed fall. We have evaluated you and determined that you do not require further hospital interventions.    During your stay you had the following relevant results: your blood tests did not show any significant new changes. Your pro-BNP value was elevated which can sometimes be concerning for heart failure but in your case, given your lungs sounded clear and you had no other evidence of fluid overload we did not suspect heart failure at this time. Your CT scans showed some fractures in the thoracic spine but they were age-indeterminate and most likely old. Your urine was not concerning for a UTI at this time given the lack of bacteria. We did send a urine culture so if the results come back positive we can send prescriptions for antibiotics at that time.    Please follow up with your primary care provider as necessary to discuss the results of your stay in our department.    If you start to experience worsening symptoms such as confusion, lethargy, headaches, nausea/vomiting, vision changes, one-sided weakness, chest pain, shortness of breath, abdominal pain or any other concerns, please return to the emergency department for further evaluation.

## 2025-03-23 NOTE — ED PROVIDER NOTE - CLINICAL SUMMARY MEDICAL DECISION MAKING FREE TEXT BOX
HPI:  89 F w/ Hx of CAD on ASA/Plavix, HFrEF (EF 53% 8/2024), Afib on Xarelto, HTN, HLD, hypothyroidism, recurrent UTIs, dementia who presents s/p unwitnessed fall at [place]. She was found on the ground by her bed this morning with a left forehead hematoma with some dried blood. Otherwise, AOx1 which appears to be baseline for her dementia. She is moving all 4 extremities but history is limited otherwise.    ROS:  As stated above.    FHx/SHx:   Non-contributory.    PE:   Vital signs reviewed.  GENERAL: No acute distress, non toxic-appearing  HEAD: Normocephalic  EARS: Externally normal, atraumatic  EYES: (+) L hematoma with dried blood and ecchymosis superior to left orbit. EOMI. No jaundice, not injected, no rupture, no foreign bodies  MOUTH: Moist mucous membranes. No dried blood in the mouth, tongue intact.   NECK: No midline or paraspinal tenderness. FROM. No swelling, no lymphadenopathy  HEART: Regular rate and rhythm, normal S1/S2, no murmurs, no rubs, no gallops  LUNGS: Clear to auscultation bilaterally without rhonchi, rales, or wheezing  ABDOMEN: Soft, non-distended, and non tender in all 4 quadrants  BACK: No midline or paraspinal tenderness  PELVIS: No crepitus  EXTREMITIES: No gross deformities, moving all 4 extremities, appears atraumatic  VASCULAR: Pulses palpable in all extremities, no pitting edema, capillary refill <2 secs  SKIN: Grossly intact without rash or open wounds  PSYCH: Alert and oriented x 1  NEURO: Strength 5/5 and sensation intact in all 4 extremities.    A/P:  89 F w/ Hx as above who presents s/p unwitnessed fall this morning. On ASA, Plavix and Xarelto. Evidence of blunt trauma to the head. No evidence of seizure-like behavior. Exam significant for L forehead hematoma. Differential diagnosis includes but is not limited to: ACS vs sepsis vs mechanical fall vs unlikely seizure.  - labs including troponin, pro-BNP, TSH, CK  - UA, UCx, RVP  - CT H, C-spine, Chest, A/P  - CXR, XR Pelvis HPI:  89 F w/ Hx of CAD on ASA/Plavix, HFrEF (EF 53% 8/2024), Afib on Xarelto, HTN, HLD, hypothyroidism, recurrent UTIs, dementia who presents s/p unwitnessed fall at [place]. She was found on the ground by her bed this morning with a left forehead hematoma with some dried blood. Otherwise, AOx1 which appears to be baseline for her dementia. She is moving all 4 extremities but history is limited otherwise.    ROS:  As stated above.    FHx/SHx:   Non-contributory.    PE:   Vital signs reviewed.  GENERAL: No acute distress, non toxic-appearing  HEAD: Normocephalic  EARS: Externally normal, atraumatic  EYES: (+) L hematoma with dried blood and ecchymosis superior to left orbit. EOMI. No jaundice, not injected, no rupture, no foreign bodies  MOUTH: Moist mucous membranes. No dried blood in the mouth, tongue intact.   NECK: No midline or paraspinal tenderness. FROM. No swelling, no lymphadenopathy  HEART: Regular rate and rhythm, normal S1/S2, no murmurs, no rubs, no gallops  LUNGS: Clear to auscultation bilaterally without rhonchi, rales, or wheezing  ABDOMEN: Soft, non-distended, and non tender in all 4 quadrants  BACK: No midline or paraspinal tenderness  PELVIS: No crepitus  EXTREMITIES: No gross deformities, moving all 4 extremities, appears atraumatic  VASCULAR: Pulses palpable in all extremities, no pitting edema, capillary refill <2 secs  SKIN: Grossly intact without rash or open wounds  PSYCH: Alert and oriented x 1  NEURO: Strength 5/5 and sensation intact in all 4 extremities.    A/P:  89 F w/ Hx as above who presents s/p unwitnessed fall this morning. On ASA, Plavix and Xarelto. Evidence of blunt trauma to the head. No evidence of seizure-like behavior. Exam significant for L forehead hematoma. Differential diagnosis includes but is not limited to: ACS vs sepsis vs mechanical fall vs unlikely seizure.  - labs including troponin, pro-BNP, TSH, CK  - UA, UCx, RVP  - CT H, C-spine, Chest, A/P  - CXR, XR Pelvis    RGUJRAL 89-year-old female with past medical history of dementia, hyperlipidemia, CHF, aortic stenosis status post TAVR, A-fib on Xarelto, hypothyroidism, recurrent UTIs presents from the [facility] status post unwitnessed fall.  Patient has dementia is awake and alert x 1.  Denies any acute complaints and does not recall the event.  On exam patient is GCS 15 left frontal abrasion with dried blood.  No posterior midline cervical thoracic lumbar tenderness no chest wall tenderness abdomen soft nontender pelvis is stable no gross deformity moving all 4 extremities.  Will obtain screening blood work, EKG reviewed with known A-fib.  Patient is on anticoagulation will obtain CT to rule out injury.  UA for evaluation of infection with history of recurrent UTIs supportive care monitor.

## 2025-03-23 NOTE — ED ADULT NURSE NOTE - CAS TRG GEN SKIN CONDITION
Segundo from [facility] calling stating pt is taking melatonin 5 mg at night and it is not helping. Pt is laying in bed unable to sleep feeling nervous. Segundo asking if there is anything else she can try to take, like maybe a low dose of clonazepam? Or something. Please advise.    Warm

## 2025-03-23 NOTE — ED PROVIDER NOTE - PROGRESS NOTE DETAILS
Wiley Cruz (EM/IM PGY-1). Vital signs stable. Patient resting comfortably in stretcher. Labs and CT not significant for new acute changes. Patient's daughter contacted and notified of results as well. She feels strongly that patient would benefit from return to her assisted living facility. Urine with some leukocyte esterases and WBC but NO bacteria. Will defer empiric treatment in favor of waiting for culture results. Safe for discharge. Patient is reassessed, states feeling much better at this time. Spoke to daughter, is comfortable with patient being discharged to [facility]. Patient ambulated in ED and stable, tolerating PO. Non emergent ambulance. RGUJRAL Patient became agitated while in the ED, I called [facility] and spoke to Bessie wellness supervisor, states pt did no receive her morning meds this morning, will order medications. Pt agitated refusing oral meds, hitting staff, ordered haldol 1mg IV. I spoke to João regarding her results and that patient will return. RGUJRAL

## 2025-03-23 NOTE — ED ADULT TRIAGE NOTE - MEANS OF ARRIVAL
Conjunctivitis Discharge Instructions  The following instructions were given to child and parent  - Administer the antibiotic eye drops as prescribed, even if your child feeling better after a few days, unless medication is not tolerated. If the course of antibiotic treatment is stopped too soon, the stronger bacteria will survive and possibly cause a second, more serious infection.   - If rash or other allergic signs and symptoms occur, discontinue medication immediately and call [phone] [employer] ([phone]) for further instructions. - Call 911 if severe allergic reaction occurs such as difficulty breathing, hives, swelling of the face, tongue, or throat, as this may indicate a life-threatening condition.   - Go to ER if severe eye pain or swelling around eye develops and any vision changes noted   - Take child directly to the ER for any acute problems: difficulty breathing, abdominal pain, chest pain, palpitation, severe back pain difficulty in swallowing, blurring of vision uncontrolled fever, uncontrolled cough, rash, vomiting,  diarrhea, headache, stiff neck, dizziness, increased lethargy or any change in your level of consciousness - Seek immediate medical care if you experience sudden or severe swelling around your eyes, high fever (above 102Â°F, orally) for 3 days or longer, severe or worsening headache or neck stiffness.   - Follow up with childâs pediatrician or with an ophthalmologist if the eye symptoms not getting better in 24 hours.   - Conjunctivitis is highly contagious, wash your hands after touching your eyes or face   - Do not go to school or  for 24 hours of eye drops and until the symptoms are better   Additional treatment / comfort measures   - Take acetaminophen (Tylenol) oral suspension every 8 hours if needed for pain or fever per manufacture instructions for few days   OR    - Ibuprofen liquid (oral suspension) give w with food every 8 hours if needed for pain or fever per manufacture instructions for few days   - Warm compresses to affected eye (s) as directed   - Change pillowcase every night as discussed   - Discard all the contaminated eye products and mascara you were using recently  - Use disinfectant on doorknob, microwave handle, toilet flush handles etc. as discussed     Unable to collect COVID-19 PCR test child is not co-operative.  Mother was intrusted to take child to pediatrician for a COVID-19 PCR testing stretcher

## 2025-03-23 NOTE — ED ADULT NURSE NOTE - OBJECTIVE STATEMENT
89y F BIBEMS from [facility] status post unwitnessed single mechanical fall off bed, pt hit her head on night stand, noticeable ecchymosis and lac to face above left eye. Pt is axo1 at baseline, hx of dementia. Ambulates with walker at baseline. Denies headache, dizziness, vision changes, chest pain, shortness of breath, abdominal pain, nausea, vomiting, diarrhea, fevers, chills, dysuria, recent illness. Patient undressed and placed into gown, call bell in hand and side rails up with bed in lowest position for safety. blanket provided. Comfort and safety provided.
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
## 2025-03-23 NOTE — ED PROVIDER NOTE - DATE/TIME 2
[de-identified] : This patient presents today with complaints of right knee pain.  She was seen by Dr. Logan where she is noted to have some arthritis of the knee as well as significant medial meniscal tearing.  She has been complaining of significant pain since injury she sustained in April.  She has been taking naproxen and ibuprofen and using pain patches for the pain.  She did have an MRI done by Dr. Logan as well as x-rays by Dr. Logan and previous office visits.  She doing swelling and stiffness and significant pain in the right knee.  Pain localized to the medial joint.  She had problems ambulating without significant discomfort.  7 problems climbing stairs as well.  She presents today for evaluation regarding continued knee pain. 23-Mar-2025 11:14

## 2025-03-24 LAB
CULTURE RESULTS: SIGNIFICANT CHANGE UP
SPECIMEN SOURCE: SIGNIFICANT CHANGE UP

## 2025-03-24 NOTE — ED POST DISCHARGE NOTE - DETAILS
3/24: spoke with kirstin in wellness at facility, made aware of findings and recommendations - Janae Salguero PA-C

## 2025-04-06 ENCOUNTER — EMERGENCY (EMERGENCY)
Facility: HOSPITAL | Age: 89
LOS: 1 days | End: 2025-04-06
Attending: EMERGENCY MEDICINE
Payer: MEDICARE

## 2025-04-06 VITALS
HEIGHT: 64 IN | OXYGEN SATURATION: 92 % | TEMPERATURE: 98 F | HEART RATE: 76 BPM | SYSTOLIC BLOOD PRESSURE: 124 MMHG | DIASTOLIC BLOOD PRESSURE: 76 MMHG | RESPIRATION RATE: 18 BRPM

## 2025-04-06 VITALS
RESPIRATION RATE: 18 BRPM | OXYGEN SATURATION: 95 % | HEART RATE: 73 BPM | DIASTOLIC BLOOD PRESSURE: 81 MMHG | TEMPERATURE: 98 F | SYSTOLIC BLOOD PRESSURE: 150 MMHG

## 2025-04-06 PROBLEM — Z87.898 PERSONAL HISTORY OF OTHER SPECIFIED CONDITIONS: Chronic | Status: ACTIVE | Noted: 2025-03-23

## 2025-04-06 LAB
ALBUMIN SERPL ELPH-MCNC: 3.8 G/DL — SIGNIFICANT CHANGE UP (ref 3.3–5)
ALP SERPL-CCNC: 66 U/L — SIGNIFICANT CHANGE UP (ref 40–120)
ALT FLD-CCNC: 18 U/L — SIGNIFICANT CHANGE UP (ref 10–45)
ANION GAP SERPL CALC-SCNC: 14 MMOL/L — SIGNIFICANT CHANGE UP (ref 5–17)
AST SERPL-CCNC: 22 U/L — SIGNIFICANT CHANGE UP (ref 10–40)
BASOPHILS # BLD AUTO: 0.08 K/UL — SIGNIFICANT CHANGE UP (ref 0–0.2)
BASOPHILS NFR BLD AUTO: 0.9 % — SIGNIFICANT CHANGE UP (ref 0–2)
BILIRUB SERPL-MCNC: 0.2 MG/DL — SIGNIFICANT CHANGE UP (ref 0.2–1.2)
BUN SERPL-MCNC: 23 MG/DL — SIGNIFICANT CHANGE UP (ref 7–23)
CALCIUM SERPL-MCNC: 9.5 MG/DL — SIGNIFICANT CHANGE UP (ref 8.4–10.5)
CHLORIDE SERPL-SCNC: 105 MMOL/L — SIGNIFICANT CHANGE UP (ref 96–108)
CO2 SERPL-SCNC: 24 MMOL/L — SIGNIFICANT CHANGE UP (ref 22–31)
CREAT SERPL-MCNC: 0.95 MG/DL — SIGNIFICANT CHANGE UP (ref 0.5–1.3)
EGFR: 57 ML/MIN/1.73M2 — LOW
EGFR: 57 ML/MIN/1.73M2 — LOW
EOSINOPHIL # BLD AUTO: 0.27 K/UL — SIGNIFICANT CHANGE UP (ref 0–0.5)
EOSINOPHIL NFR BLD AUTO: 2.9 % — SIGNIFICANT CHANGE UP (ref 0–6)
GLUCOSE SERPL-MCNC: 91 MG/DL — SIGNIFICANT CHANGE UP (ref 70–99)
HCT VFR BLD CALC: 36.4 % — SIGNIFICANT CHANGE UP (ref 34.5–45)
HGB BLD-MCNC: 11.7 G/DL — SIGNIFICANT CHANGE UP (ref 11.5–15.5)
IMM GRANULOCYTES NFR BLD AUTO: 0.3 % — SIGNIFICANT CHANGE UP (ref 0–0.9)
LYMPHOCYTES # BLD AUTO: 2.29 K/UL — SIGNIFICANT CHANGE UP (ref 1–3.3)
LYMPHOCYTES # BLD AUTO: 24.8 % — SIGNIFICANT CHANGE UP (ref 13–44)
MCHC RBC-ENTMCNC: 32.1 G/DL — SIGNIFICANT CHANGE UP (ref 32–36)
MCHC RBC-ENTMCNC: 32.6 PG — SIGNIFICANT CHANGE UP (ref 27–34)
MCV RBC AUTO: 101.4 FL — HIGH (ref 80–100)
MONOCYTES # BLD AUTO: 0.92 K/UL — HIGH (ref 0–0.9)
MONOCYTES NFR BLD AUTO: 9.9 % — SIGNIFICANT CHANGE UP (ref 2–14)
NEUTROPHILS # BLD AUTO: 5.66 K/UL — SIGNIFICANT CHANGE UP (ref 1.8–7.4)
NEUTROPHILS NFR BLD AUTO: 61.2 % — SIGNIFICANT CHANGE UP (ref 43–77)
NRBC BLD AUTO-RTO: 0 /100 WBCS — SIGNIFICANT CHANGE UP (ref 0–0)
PLATELET # BLD AUTO: 137 K/UL — LOW (ref 150–400)
POTASSIUM SERPL-MCNC: 4.1 MMOL/L — SIGNIFICANT CHANGE UP (ref 3.5–5.3)
POTASSIUM SERPL-SCNC: 4.1 MMOL/L — SIGNIFICANT CHANGE UP (ref 3.5–5.3)
PROT SERPL-MCNC: 6.3 G/DL — SIGNIFICANT CHANGE UP (ref 6–8.3)
RBC # BLD: 3.59 M/UL — LOW (ref 3.8–5.2)
RBC # FLD: 12.3 % — SIGNIFICANT CHANGE UP (ref 10.3–14.5)
SODIUM SERPL-SCNC: 143 MMOL/L — SIGNIFICANT CHANGE UP (ref 135–145)
WBC # BLD: 9.25 K/UL — SIGNIFICANT CHANGE UP (ref 3.8–10.5)
WBC # FLD AUTO: 9.25 K/UL — SIGNIFICANT CHANGE UP (ref 3.8–10.5)

## 2025-04-06 PROCEDURE — 80053 COMPREHEN METABOLIC PANEL: CPT

## 2025-04-06 PROCEDURE — 72125 CT NECK SPINE W/O DYE: CPT | Mod: MC

## 2025-04-06 PROCEDURE — 70450 CT HEAD/BRAIN W/O DYE: CPT | Mod: MC

## 2025-04-06 PROCEDURE — 93010 ELECTROCARDIOGRAM REPORT: CPT

## 2025-04-06 PROCEDURE — 93005 ELECTROCARDIOGRAM TRACING: CPT

## 2025-04-06 PROCEDURE — 96372 THER/PROPH/DIAG INJ SC/IM: CPT

## 2025-04-06 PROCEDURE — 99285 EMERGENCY DEPT VISIT HI MDM: CPT

## 2025-04-06 PROCEDURE — 70450 CT HEAD/BRAIN W/O DYE: CPT | Mod: 26

## 2025-04-06 PROCEDURE — 99285 EMERGENCY DEPT VISIT HI MDM: CPT | Mod: 25

## 2025-04-06 PROCEDURE — 72125 CT NECK SPINE W/O DYE: CPT | Mod: 26

## 2025-04-06 PROCEDURE — 85025 COMPLETE CBC W/AUTO DIFF WBC: CPT

## 2025-04-06 RX ORDER — QUETIAPINE FUMARATE 25 MG/1
50 TABLET ORAL ONCE
Refills: 0 | Status: DISCONTINUED | OUTPATIENT
Start: 2025-04-06 | End: 2025-04-06

## 2025-04-06 RX ORDER — OLANZAPINE 10 MG/1
2.5 TABLET ORAL ONCE
Refills: 0 | Status: COMPLETED | OUTPATIENT
Start: 2025-04-06 | End: 2025-04-06

## 2025-04-06 RX ADMIN — OLANZAPINE 2.5 MILLIGRAM(S): 10 TABLET ORAL at 03:30

## 2025-04-06 NOTE — ED PROVIDER NOTE - OBJECTIVE STATEMENT
89Y F PMH dementia, AS, afib on xarelto, HTN, HLD, presenting after unwitnessed fall. 89Y F PMH dementia, AS, afib on xarelto, HTN, HLD, presenting after unwitnessed fall. Patient was reportedly found down next to her bed at the [place], presumed fell after attempting to get out of bed.  Sustained small laceration to the right side of head with bleeding prompting facility to send patient to ED for further evaluation.  Patient cannot relay events leading up to her arrival in ED.  She has no acute complaints at this time.

## 2025-04-06 NOTE — ED ADULT NURSE NOTE - NSFALLHARMRISKINTERV_ED_ALL_ED
Assistance OOB with selected safe patient handling equipment if applicable/Communicate risk of Fall with Harm to all staff, patient, and family/Monitor gait and stability/Provide patient with walking aids/Provide visual cue: red socks, yellow wristband, yellow gown, etc/Reinforce activity limits and safety measures with patient and family/Bed in lowest position, wheels locked, appropriate side rails in place/Call bell, personal items and telephone in reach/Instruct patient to call for assistance before getting out of bed/chair/stretcher/Non-slip footwear applied when patient is off stretcher/[place] to call system/Physically safe environment - no spills, clutter or unnecessary equipment/Purposeful Proactive Rounding/Room/bathroom lighting operational, light cord in reach

## 2025-04-06 NOTE — ED ADULT NURSE NOTE - NSFALLCONCLUSION_ED_ALL_ED
[FreeTextEntry1] : 61F w/ DM, HTN, HLD, CAD s/p RCA stent 2017, asthma and sarcoidosis, presented for follow up for DM. \par \par HCM\par - PAP neg 7/20\par - Mammo Birads 2 11/21\par - Colonoscopy 12/20 with tubular adenoma - repeat in 2023\par - HCV neg 6/18\par - HIV not screened: May discuss in CPE visit\par - SBIRT neg\par - Flu: Got in [facility] 10/2021\par - PPSV 23 9/16\par - Tdap 5/16\par - COVID x 2 Jan and Feb ([employer])\par - Zoster: Will think about it. \par - Needs CPE next visit\par \par RTC in 10-15 weeks for DM f/u and CPE.\par \par Case d/w Dr. Saeed\par \par Zoe Pantoja, PGY-3 Fall with Harm Risk

## 2025-04-06 NOTE — ED ADULT NURSE NOTE - NSSUHOSCREENINGYN_ED_ALL_ED
4-29-24 me  Needs labs     with biopsies for H. Pylori   2. Normal appearing duodenum, biopsies taken for celiac disease   Biopsies noting mild chronic gastritis     Colonoscopy 7/2022   1. Diffuse erythema, loss of vascularity, and friability throughout colon, most pronounce in rectum and rectosigmoid colon. Random right/left and rectal biopsies taken for pathology. Stool aspirate taken for GI pathogen panel to rule out infectious etiology   2. Mild erythema in TI, biopsies taken   3. Small external hemorrhoid       C.   Terminal ileum, mucosal biopsy:   -Mildly active ileitis (please see comment).       D.   Colon, right, mucosal biopsy:   -Mildly active colitis with focal mucosal non-necrotizing granulomatous inflammation (please see comment).       E.   Colon, left, mucosal biopsy:   -Mildly active colitis with focal Paneth cell metaplasia (please see comment).       F.   Colon, rectum, mucosal biopsy:   -Mildly active proctitis with focal mucosal non-necrotizing granulomatous inflammation (please see comment).   Likely consistent with UC     REVIEW OF SYSTEMS  All systems reviewed and are negative with the exception of the findings noted in the history of present illness.     PHYSICAL EXAM  There were no vitals filed for this visit.   He is alert, nontoxic with fluent speech      ASSESSMENT/PLAN  21-year-old male with past medical history of lactose intolerance who is being seen for       1. Diarrhea, unspecified type   -egd no celiac  -colonoscopy 7/2022  Diffuse erythema, loss of vascularity, and friability throughout colon, most pronounce in rectum and rectosigmoid colon. Random right/left and rectal biopsies taken for pathology. Stool aspirate taken for GI pathogen panel to rule out infectious etiology  Mild erythema in TI, biopsies taken   Small external hemorrhoid     Biopsy noting possible UC, started on lialda 4.8 grams and symptomatic, started on budesonide  -Worsening symptoms after wean of budesonide.   -hepatitis panel and tb  test negative 2/2023  -started on humira (actually started early April 2023).  Doing much better.  BM decreased and consistency better.  Rash on leg gone.  Weight increasing.    -discontinued budesonide and lialda  -currently on humira every 2 weeks  -has eye exams but discussed ophthalmology follow up as well  -cbc, cmp pending  -to call if further questions or concerns but follow up in 6 months with Dr. Huang, sooner if symptomatic    2. Celiac disease   -patient with family history of celiac disease  -did have previous duodenal biopsies, per patient question of underlying celiac, however will work to obtain these records   -elevated tTG which again is nonspecific but could suggest celiac disease which may fit with some of his symptoms   -egd duod biopsies negative for celiac  -not tried gluten free diet, can consider to try      4. Elevated ALT measurement   -patient with previous history of elevated AST/ALT   -most recent ALT mildly elevated, previously had gone back to normal-discussed how this is a nonspecific finding, no overt concerns at this time next possibly related to muscle breakdown while working out verses underlying fatty liver disease which seems less likely   -lfts normal 11/2023  -lfts pending     Video with patient and pre-charting, chart review, documenting 15 minutes.  Follow up in 6 months.    Estrella Mosqueda PA-C  Supervising physician, Dr. Hao Huang       Yes - the patient is able to be screened

## 2025-04-06 NOTE — ED PROVIDER NOTE - INTERPRETATION
EKG independently reviewed for rate, rhythm, axis, intervals and segments, including QRS morphology, P wave appearance T wave appearance, OR interval, and QT interval.  I find the EKG to be notable as follows: a.fib, L axis, RBBB

## 2025-04-06 NOTE — ED PROVIDER NOTE - PATIENT PORTAL LINK FT
You can access the FollowMyHealth Patient Portal offered by [facility] by registering at the following website: [email or web address]. By joining [employer]’s FollowMyHealth portal, you will also be able to view your health information using other applications (apps) compatible with our system.
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
## 2025-04-06 NOTE — ED ADULT NURSE NOTE - OBJECTIVE STATEMENT
89y Female AAOx2 BIBEMS from [facility] afib on xarelto, HTN, CHF, HTN, dementia, hypothyroidism, presents to ED for unwitnessed fall. Per EMS pt was found on floor near her bed. Pt reports being asleep, rolled over and fell on floor. +headstrike denies LOC or dizziness, +hx of falls, + AC, abrasion to the R eyebrow. Pt denies pain, able to move all extremities, pulse present +2, denies fever, chills, chest pain, n/v, shortness of breath, abdominal pain, dizziness, numbness or tingling

## 2025-04-06 NOTE — ED ADULT NURSE NOTE - NS ED NURSE LEVEL OF CONSCIOUSNESS AFFECT
Described as substernal chest pressure  Associated with significantly elevated blood pressure  Troponin thus far has been normal, continue to trend  Monitor patient on telemetry  Repeat EKG  If no worsening, obtain a stress test in the morning  Medical management in the meantime   Calm

## 2025-04-06 NOTE — ED PROVIDER NOTE - CLINICAL SUMMARY MEDICAL DECISION MAKING FREE TEXT BOX
89-year-old female history as above presenting from the [place] after unwitnessed fall.  Has small laceration/abrasion to the right lateral thigh/temple region, bleeding controlled.  Not likely to require repair.  Exam otherwise unremarkable.  No external evidence of traumatic injury.  Patient is on Xarelto for history of A-fib.  Plan CT head and cervical spine, x-ray chest and pelvis to assess for acute traumatic injury.  Dispo pending imaging results, reassessment. 89-year-old female history as above presenting from the [facility] [facility] after unwitnessed fall.  Has small laceration/abrasion to the right lateral thigh/temple region, bleeding controlled.  Not likely to require repair.  Exam otherwise unremarkable.  No external evidence of traumatic injury.  Patient is on Xarelto for history of A-fib.  Plan CT head and cervical spine, x-ray chest and pelvis to assess for acute traumatic injury.  Dispo pending imaging results, reassessment.    see attending attestation authored by me, Mk Crews MD, for further MDM details.

## 2025-04-06 NOTE — ED PROVIDER NOTE - PROGRESS NOTE DETAILS
Rima Garg MD PGY-3: CT head and C-spine without acute traumatic injury.  I spoke with patient's daughter by phone as well as nursing supervisor from the [facility] [place] who are aware of plan to discharge back to facility at this time.  Nonemergent transportation arranged for DC back to [place].  Patient resting comfortably at this time following IM Zyprexa administration. Rima Garg MD PGY-3: Patient received CT scans however is refusing x-rays, states "we can try later".  Patient subsequently attempting to get out of bed and ambulate independently despite being a fall risk, noted to be standing independently without discomfort.  She was unable to be redirected back to bed, became acutely agitated with staff.  Offered p.o. Seroquel which patient refused.  Will give 2.5 mg IM olanzapine for agitation while awaiting CT results.  Will defer x-rays given patient able to ambulate without pain, no acute respiratory distress or other findings to suggest intrathoracic process at this time.

## 2025-04-06 NOTE — ED ADULT NURSE REASSESSMENT NOTE - NS ED NURSE REASSESS COMMENT FT1
Patient breathing spontaneous, unlabored. Pt resting at bedside. 1:1 in place for safety, safe environment maintained. Waiting for nonemergent d/c. Bed in low position, bed rails raised.

## 2025-04-06 NOTE — ED ADULT NURSE REASSESSMENT NOTE - NS ED NURSE REASSESS COMMENT FT1
Pt agitated, trying to leave ED, at risk for elopement, risk for falls, 1:1 initiated for safety, 1:1 at bedside, safety assessed.

## 2025-04-06 NOTE — ED PROVIDER NOTE - ATTENDING CONTRIBUTION TO CARE
89 yo female hx of dementia, on a/c, presents after fall out of bed @ facility, sent to ED for further eval.  EMS @ bedside for collateral.     dried blood noted on scalp, no laceration requiring intervention.  will CT head, r/o traumatic injury and reassess.

## 2025-04-06 NOTE — ED PROVIDER NOTE - NSFOLLOWUPINSTRUCTIONS_ED_ALL_ED_FT
You were seen in the ER today after a fall.     The results of all of the testing performed today are included in this paperwork.    Continue all of your medications as currently prescribed.     For pain, you may take:  1) Acetaminophen (Tylenol): 650mg every 6 hours or as needed for pain    Follow up with your primary doctor in the next 2-3 days to be re-evaluated. Bring this paperwork with you to any follow-up appointments to discuss the results of any testing performed at today's visit.    Return to the ER if you develop any new or worsening symptoms including chest pain, difficulty breathing, or if you are otherwise concerned.

## 2025-04-06 NOTE — ED PROVIDER NOTE - PHYSICAL EXAMINATION
GENERAL: Awake, alert, NAD nontoxic appearing  HEAD: small abrasion/lac lateral to R eye with dried blood, no active bleeding noted; healing ecchymosis to R side of face/periorbital region; neck supple without midline tenderness, moist mucous membranes  EYES: PERRL, EOM grossly intact, sclera anicteric  LUNGS: normal WOB on RA, CTAB, no wheezes or crackles   CARDIAC: RRR, no m/r/g  ABDOMEN: Soft, non tender, non distended, no rebound, no guarding  BACK: No midline spinal tenderness, no CVA tenderness  EXT: No edema, no calf tenderness, no deformities.  NEURO: A&Ox1. Moving all extremities, full ROM without pain. No tenderness to palpation of extremities.   SKIN: Warm and dry. No rash.  PSYCH: Normal affect.

## 2025-05-16 NOTE — BH CONSULTATION LIAISON ASSESSMENT NOTE - SUICIDE ATTEMPT:
Inpatient Rehab Program (IRP) admission orientation completed with handbook provided. Patient and/or care partner educated on the role of the Care Coordinator. Per CMS requirements, patient was provided with a copy of the CMS IRF Privacy Acts rights in the patient handbook.      Care Coordinator is Portia CALLAHAN.   None known

## 2025-05-23 ENCOUNTER — EMERGENCY (EMERGENCY)
Facility: HOSPITAL | Age: 89
LOS: 1 days | End: 2025-05-23
Attending: EMERGENCY MEDICINE
Payer: MEDICARE

## 2025-05-23 VITALS
HEIGHT: 64 IN | DIASTOLIC BLOOD PRESSURE: 92 MMHG | HEART RATE: 81 BPM | TEMPERATURE: 98 F | RESPIRATION RATE: 20 BRPM | SYSTOLIC BLOOD PRESSURE: 163 MMHG | OXYGEN SATURATION: 95 % | WEIGHT: 119.93 LBS

## 2025-05-23 VITALS
DIASTOLIC BLOOD PRESSURE: 74 MMHG | HEART RATE: 100 BPM | RESPIRATION RATE: 16 BRPM | OXYGEN SATURATION: 99 % | SYSTOLIC BLOOD PRESSURE: 97 MMHG

## 2025-05-23 LAB
ALBUMIN SERPL ELPH-MCNC: 4.1 G/DL — SIGNIFICANT CHANGE UP (ref 3.3–5)
ALP SERPL-CCNC: 55 U/L — SIGNIFICANT CHANGE UP (ref 40–120)
ALT FLD-CCNC: 19 U/L — SIGNIFICANT CHANGE UP (ref 10–45)
ANION GAP SERPL CALC-SCNC: 14 MMOL/L — SIGNIFICANT CHANGE UP (ref 5–17)
APPEARANCE UR: CLEAR — SIGNIFICANT CHANGE UP
APPEARANCE UR: CLEAR — SIGNIFICANT CHANGE UP
AST SERPL-CCNC: 31 U/L — SIGNIFICANT CHANGE UP (ref 10–40)
BACTERIA # UR AUTO: NEGATIVE /HPF — SIGNIFICANT CHANGE UP
BACTERIA # UR AUTO: NEGATIVE /HPF — SIGNIFICANT CHANGE UP
BASOPHILS # BLD AUTO: 0.07 K/UL — SIGNIFICANT CHANGE UP (ref 0–0.2)
BASOPHILS NFR BLD AUTO: 0.7 % — SIGNIFICANT CHANGE UP (ref 0–2)
BILIRUB SERPL-MCNC: 0.3 MG/DL — SIGNIFICANT CHANGE UP (ref 0.2–1.2)
BILIRUB UR-MCNC: NEGATIVE — SIGNIFICANT CHANGE UP
BILIRUB UR-MCNC: NEGATIVE — SIGNIFICANT CHANGE UP
BUN SERPL-MCNC: 26 MG/DL — HIGH (ref 7–23)
CALCIUM SERPL-MCNC: 9.4 MG/DL — SIGNIFICANT CHANGE UP (ref 8.4–10.5)
CAST: 0 /LPF — SIGNIFICANT CHANGE UP (ref 0–4)
CAST: 1 /LPF — SIGNIFICANT CHANGE UP (ref 0–4)
CHLORIDE SERPL-SCNC: 103 MMOL/L — SIGNIFICANT CHANGE UP (ref 96–108)
CO2 SERPL-SCNC: 22 MMOL/L — SIGNIFICANT CHANGE UP (ref 22–31)
COLOR SPEC: YELLOW — SIGNIFICANT CHANGE UP
COLOR SPEC: YELLOW — SIGNIFICANT CHANGE UP
CREAT SERPL-MCNC: 0.96 MG/DL — SIGNIFICANT CHANGE UP (ref 0.5–1.3)
DIFF PNL FLD: ABNORMAL
DIFF PNL FLD: ABNORMAL
EGFR: 57 ML/MIN/1.73M2 — LOW
EGFR: 57 ML/MIN/1.73M2 — LOW
EOSINOPHIL # BLD AUTO: 0.15 K/UL — SIGNIFICANT CHANGE UP (ref 0–0.5)
EOSINOPHIL NFR BLD AUTO: 1.6 % — SIGNIFICANT CHANGE UP (ref 0–6)
GLUCOSE SERPL-MCNC: 94 MG/DL — SIGNIFICANT CHANGE UP (ref 70–99)
GLUCOSE UR QL: NEGATIVE MG/DL — SIGNIFICANT CHANGE UP
GLUCOSE UR QL: NEGATIVE MG/DL — SIGNIFICANT CHANGE UP
HCT VFR BLD CALC: 41 % — SIGNIFICANT CHANGE UP (ref 34.5–45)
HGB BLD-MCNC: 13.5 G/DL — SIGNIFICANT CHANGE UP (ref 11.5–15.5)
IMM GRANULOCYTES NFR BLD AUTO: 0.4 % — SIGNIFICANT CHANGE UP (ref 0–0.9)
KETONES UR QL: NEGATIVE MG/DL — SIGNIFICANT CHANGE UP
KETONES UR QL: NEGATIVE MG/DL — SIGNIFICANT CHANGE UP
LEUKOCYTE ESTERASE UR-ACNC: ABNORMAL
LEUKOCYTE ESTERASE UR-ACNC: NEGATIVE — SIGNIFICANT CHANGE UP
LYMPHOCYTES # BLD AUTO: 1.19 K/UL — SIGNIFICANT CHANGE UP (ref 1–3.3)
LYMPHOCYTES # BLD AUTO: 12.6 % — LOW (ref 13–44)
MAGNESIUM SERPL-MCNC: 2.1 MG/DL — SIGNIFICANT CHANGE UP (ref 1.6–2.6)
MCHC RBC-ENTMCNC: 32.9 G/DL — SIGNIFICANT CHANGE UP (ref 32–36)
MCHC RBC-ENTMCNC: 33.3 PG — SIGNIFICANT CHANGE UP (ref 27–34)
MCV RBC AUTO: 101.2 FL — HIGH (ref 80–100)
MONOCYTES # BLD AUTO: 0.6 K/UL — SIGNIFICANT CHANGE UP (ref 0–0.9)
MONOCYTES NFR BLD AUTO: 6.4 % — SIGNIFICANT CHANGE UP (ref 2–14)
NEUTROPHILS # BLD AUTO: 7.39 K/UL — SIGNIFICANT CHANGE UP (ref 1.8–7.4)
NEUTROPHILS NFR BLD AUTO: 78.3 % — HIGH (ref 43–77)
NITRITE UR-MCNC: NEGATIVE — SIGNIFICANT CHANGE UP
NITRITE UR-MCNC: NEGATIVE — SIGNIFICANT CHANGE UP
NRBC BLD AUTO-RTO: 0 /100 WBCS — SIGNIFICANT CHANGE UP (ref 0–0)
PH UR: 6.5 — SIGNIFICANT CHANGE UP (ref 5–8)
PH UR: 7.5 — SIGNIFICANT CHANGE UP (ref 5–8)
PLATELET # BLD AUTO: 125 K/UL — LOW (ref 150–400)
POTASSIUM SERPL-MCNC: 4.6 MMOL/L — SIGNIFICANT CHANGE UP (ref 3.5–5.3)
POTASSIUM SERPL-SCNC: 4.6 MMOL/L — SIGNIFICANT CHANGE UP (ref 3.5–5.3)
PROT SERPL-MCNC: 6.8 G/DL — SIGNIFICANT CHANGE UP (ref 6–8.3)
PROT UR-MCNC: NEGATIVE MG/DL — SIGNIFICANT CHANGE UP
PROT UR-MCNC: NEGATIVE MG/DL — SIGNIFICANT CHANGE UP
RBC # BLD: 4.05 M/UL — SIGNIFICANT CHANGE UP (ref 3.8–5.2)
RBC # FLD: 12.6 % — SIGNIFICANT CHANGE UP (ref 10.3–14.5)
RBC CASTS # UR COMP ASSIST: 19 /HPF — HIGH (ref 0–4)
RBC CASTS # UR COMP ASSIST: 48 /HPF — HIGH (ref 0–4)
REVIEW: SIGNIFICANT CHANGE UP
SODIUM SERPL-SCNC: 139 MMOL/L — SIGNIFICANT CHANGE UP (ref 135–145)
SP GR SPEC: 1.01 — SIGNIFICANT CHANGE UP (ref 1–1.03)
SP GR SPEC: 1.01 — SIGNIFICANT CHANGE UP (ref 1–1.03)
SQUAMOUS # UR AUTO: 1 /HPF — SIGNIFICANT CHANGE UP (ref 0–5)
SQUAMOUS # UR AUTO: 4 /HPF — SIGNIFICANT CHANGE UP (ref 0–5)
UROBILINOGEN FLD QL: 0.2 MG/DL — SIGNIFICANT CHANGE UP (ref 0.2–1)
UROBILINOGEN FLD QL: 0.2 MG/DL — SIGNIFICANT CHANGE UP (ref 0.2–1)
WBC # BLD: 9.44 K/UL — SIGNIFICANT CHANGE UP (ref 3.8–10.5)
WBC # FLD AUTO: 9.44 K/UL — SIGNIFICANT CHANGE UP (ref 3.8–10.5)
WBC UR QL: 0 /HPF — SIGNIFICANT CHANGE UP (ref 0–5)
WBC UR QL: 5 /HPF — SIGNIFICANT CHANGE UP (ref 0–5)

## 2025-05-23 PROCEDURE — 96374 THER/PROPH/DIAG INJ IV PUSH: CPT | Mod: XU

## 2025-05-23 PROCEDURE — 74177 CT ABD & PELVIS W/CONTRAST: CPT

## 2025-05-23 PROCEDURE — 74177 CT ABD & PELVIS W/CONTRAST: CPT | Mod: 26

## 2025-05-23 PROCEDURE — 93010 ELECTROCARDIOGRAM REPORT: CPT

## 2025-05-23 PROCEDURE — 99285 EMERGENCY DEPT VISIT HI MDM: CPT | Mod: 25

## 2025-05-23 PROCEDURE — 81001 URINALYSIS AUTO W/SCOPE: CPT

## 2025-05-23 PROCEDURE — 83735 ASSAY OF MAGNESIUM: CPT

## 2025-05-23 PROCEDURE — 93005 ELECTROCARDIOGRAM TRACING: CPT

## 2025-05-23 PROCEDURE — 72170 X-RAY EXAM OF PELVIS: CPT

## 2025-05-23 PROCEDURE — 72125 CT NECK SPINE W/O DYE: CPT

## 2025-05-23 PROCEDURE — 23500 CLTX CLAVICULAR FX W/O MNPJ: CPT | Mod: 54,LT

## 2025-05-23 PROCEDURE — 85025 COMPLETE CBC W/AUTO DIFF WBC: CPT

## 2025-05-23 PROCEDURE — 70450 CT HEAD/BRAIN W/O DYE: CPT | Mod: 26

## 2025-05-23 PROCEDURE — 72125 CT NECK SPINE W/O DYE: CPT | Mod: 26

## 2025-05-23 PROCEDURE — 99291 CRITICAL CARE FIRST HOUR: CPT | Mod: 57

## 2025-05-23 PROCEDURE — 73060 X-RAY EXAM OF HUMERUS: CPT | Mod: 26,LT

## 2025-05-23 PROCEDURE — 80053 COMPREHEN METABOLIC PANEL: CPT

## 2025-05-23 PROCEDURE — 70450 CT HEAD/BRAIN W/O DYE: CPT

## 2025-05-23 PROCEDURE — 72170 X-RAY EXAM OF PELVIS: CPT | Mod: 26

## 2025-05-23 PROCEDURE — 71045 X-RAY EXAM CHEST 1 VIEW: CPT

## 2025-05-23 PROCEDURE — 73030 X-RAY EXAM OF SHOULDER: CPT

## 2025-05-23 PROCEDURE — 73060 X-RAY EXAM OF HUMERUS: CPT

## 2025-05-23 PROCEDURE — 96372 THER/PROPH/DIAG INJ SC/IM: CPT | Mod: XU

## 2025-05-23 PROCEDURE — 73030 X-RAY EXAM OF SHOULDER: CPT | Mod: 26,LT

## 2025-05-23 PROCEDURE — 71045 X-RAY EXAM CHEST 1 VIEW: CPT | Mod: 26

## 2025-05-23 PROCEDURE — 87086 URINE CULTURE/COLONY COUNT: CPT

## 2025-05-23 RX ORDER — HALOPERIDOL 10 MG/1
5 TABLET ORAL ONCE
Refills: 0 | Status: COMPLETED | OUTPATIENT
Start: 2025-05-23 | End: 2025-05-23

## 2025-05-23 RX ORDER — QUETIAPINE FUMARATE 25 MG/1
50 TABLET ORAL ONCE
Refills: 0 | Status: COMPLETED | OUTPATIENT
Start: 2025-05-23 | End: 2025-05-23

## 2025-05-23 RX ORDER — ACETAMINOPHEN 500 MG/5ML
1000 LIQUID (ML) ORAL ONCE
Refills: 0 | Status: COMPLETED | OUTPATIENT
Start: 2025-05-23 | End: 2025-05-23

## 2025-05-23 RX ADMIN — Medication 400 MILLIGRAM(S): at 09:46

## 2025-05-23 RX ADMIN — HALOPERIDOL 5 MILLIGRAM(S): 10 TABLET ORAL at 13:18

## 2025-05-23 RX ADMIN — Medication 2000 MILLILITER(S): at 13:09
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
## 2025-05-23 NOTE — ED PROVIDER NOTE - PHYSICAL EXAMINATION
GENERAL: No acute distress, non toxic-appearing  HEAD: Normocephalic  EARS: Externally normal, atraumatic  EYES: PERRLA, EOMI. No jaundice, not injected, no rupture, no foreign bodies  MOUTH: Moist mucous membranes. No dried blood in the mouth, tongue intact.   NECK: No midline or paraspinal tenderness. FROM. No swelling, no lymphadenopathy  HEART: Regular rate and rhythm, normal S1/S2, no murmurs, no rubs, no gallops  LUNGS: Clear to auscultation bilaterally without rhonchi, rales, or wheezing  ABDOMEN: Soft, non-distended, and non tender in all 4 quadrants  BACK: No midline or paraspinal tenderness  PELVIS: No crepitus  EXTREMITIES:LUE in sling held in abduction, TTP over proximal humerus.  No gross deformities  VASCULAR: Pulses palpable in all extremities, no pitting edema, capillary refill <2 secs  SKIN: Grossly intact without rash or open wounds  PSYCH: Alert and oriented x0  NEURO: Strength 5/5 and sensation intact in all 4 extremities.

## 2025-05-23 NOTE — ED PROVIDER NOTE - CLINICAL SUMMARY MEDICAL DECISION MAKING FREE TEXT BOX
89 y/o F w/ PMHx of CAD on ASA/Plavix, HFrEF (EF 53% 8/2024), Afib on Xarelto, HTN, HLD, hypothyroidism, recurrent UTIs, dementia, frequent falls who presents BIBEMS s/p unwitnessed fall at [place]. Per EMS she had a presumed unwitnessed fall and when they got there she was ambulating in the hallway holding her left shoulder abducted and supporting her L arm with her R hand. Patient is awake and alert but does not answer questions.   VS non actionable, afebrile  On exam patient is holding her L arm abducted and TTP over proximal humerus with no obvious deformity.   DDx/plan- unwitnessed fall unclear if mechanical will obtain CTH, c-spine. Xrays chest, pelvis, LUe to eval for fx. Blood work/urine/ekg to eval for non mechanical causes of fall.

## 2025-05-23 NOTE — ED PROVIDER NOTE - ATTENDING CONTRIBUTION TO CARE
see mdm    edited by Radha Lugo DO - attending physician.   Please see progress notes for status/labs/consult updates and ED course after initial presentation. agitated delirium     see mdm    edited by Radha Lugo DO - attending physician.   Please see progress notes for status/labs/consult updates and ED course after initial presentation.

## 2025-05-23 NOTE — ED PROVIDER NOTE - NS ED MD DISPO DISCHARGE CCDA
See separate telephone encounter regarding this.    Patient/Caregiver provided printed discharge information.

## 2025-05-23 NOTE — ED ADULT TRIAGE NOTE - STATUS:
Caller: Josemanuel De La Fuente    Relationship: Self    Best call back number: [phone]    What is the best time to reach you: ANYTIME    Who are you requesting to speak with (clinical staff, provider,  specific staff member): CLINICAL        What was the call regarding: PT CALLED BACK --HAVING SOME BLEEDING FROM SURGERY ON YESTERDAY.    CALLED PRACTICE- ADVISED TO SEND TE      
Per Dr. Fox, pt can take off and change outer layer of gauze and wrap with fresh, but do not take off anything that is laying next to the skin.    Pt understood instructions but stated his 2nd toe looks bruised and was concerned.  Pt will be sending a picture through [employer] to show Dr. Fox.  
Provider: DR. SOUSA    Caller: LIONEL NELSON    Relationship to Patient: SELF    Phone Number: [phone]    Reason for Call: PATIENT CALLED STATING HE HAS KEPT HIS TOE BANDAGED FOLLOWING 09/03/24 AMPUTATION DIGIT RIGHT 3RD TOE, BUT SOME BLOOD HAS SOAKED THROUGH HIS SOCK AND HE HAS SOME CONCERNS REGARDING THAT. UNABLE WT. PLEASE ADVISE.  
Applied

## 2025-05-23 NOTE — ED ADULT NURSE NOTE - NSFALLHARMRISKINTERV_ED_ALL_ED
Assistance OOB with selected safe patient handling equipment if applicable/Assistance with ambulation/Communicate risk of Fall with Harm to all staff, patient, and family/Monitor for mental status changes and reorient to person, place, and time, as needed/Move patient closer to nursing station/within visual sight of ED staff/Provide visual cue: red socks, yellow wristband, yellow gown, etc/Reinforce activity limits and safety measures with patient and family/Toileting schedule using arm’s reach rule for commode and bathroom/Use of alarms - bed, stretcher, chair and/or video monitoring/Bed in lowest position, wheels locked, appropriate side rails in place/Call bell, personal items and telephone in reach/Instruct patient to call for assistance before getting out of bed/chair/stretcher/Non-slip footwear applied when patient is off stretcher/[place] to call system/Physically safe environment - no spills, clutter or unnecessary equipment/Purposeful Proactive Rounding/Room/bathroom lighting operational, light cord in reach
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
## 2025-05-23 NOTE — ED PROVIDER NOTE - NSFOLLOWUPINSTRUCTIONS_ED_ALL_ED_FT
You came to the Emergency Department today after a fall. Fortunately, your head CT, neck CT, abdominal CT, and chest and pelvis x-rays did not show any acute injuries other than a fracture of your left clavicle (collarbone).    Treatment: You have been given a sling to wear for your left clavicle fracture. This will help immobilize the bone and allow it to heal.    Medications: You may take over-the-counter acetaminophen (Tylenol) for pain as directed on the bottle. Avoid taking more than the recommended dose. Do not take any other pain medications without first checking with your doctor or pharmacist, as some may interact negatively with other medications you are taking.    Follow-up Care: It is important to follow up with an orthopedist (bone specialist) within 1 week. They will evaluate your fracture and determine the best course of treatment. Please call their office to schedule an appointment as soon as possible.    Fall Precautions: Since you have recently fallen, it’s important to take steps to prevent future falls. Please review and follow these precautions at home:    Remove tripping hazards: Clear walkways of clutter, such as rugs, cords, and furniture. Ensure adequate lighting in all areas, especially hallways and stairwells.  Bathroom safety: Use non-slip mats in the bathtub or shower. Install grab bars near the toilet and in the shower. Consider using a shower chair or bench.  Assistive devices: If you have difficulty with balance or mobility, consider using a cane or walker. Discuss this with your orthopedist or primary care provider.  Medication review: Some medications can cause dizziness or drowsiness, increasing your fall risk. Review your medications with your doctor or pharmacist to see if any adjustments are needed.  Vision check: Ensure your vision is optimal. Schedule an eye exam if you haven't had one recently.  Stay active: Regular exercise, especially activities that improve balance and strength, can help reduce your risk of falling. Talk to your doctor about safe exercise options.  Get up slowly: Avoid sudden movements, especially when getting up from a sitting or lying position.  Return to the ED: Return to the emergency department if you experience any of the following:    Increased pain or swelling in your left shoulder or collarbone area.  Numbness or tingling in your left arm or hand.  Shortness of breath or chest pain.  Any new or worsening symptoms.  If you have any questions or concerns, please do not hesitate to contact your doctor or return to the Emergency Department.

## 2025-05-23 NOTE — ED PROVIDER NOTE - PATIENT PORTAL LINK FT
You can access the FollowMyHealth Patient Portal offered by [facility] by registering at the following website: [email or web address]. By joining [facility]’s FollowMyHealth portal, you will also be able to view your health information using other applications (apps) compatible with our system.

## 2025-05-23 NOTE — ED ADULT NURSE NOTE - OBJECTIVE STATEMENT
Female 89 years old with past medical history of CAD on Plavix and Aspirin, Afib on Xarelto and Dementia brought in by EMS for fall. Pt had unwitnessed fall this morning at [facility]. As per EMS pt had unwitnessed fall but when they arrive in [place] pt was ambulating in the hallway holding her left shoulder and supporting her left arm and hand. Awake and alert answers only to questions her name and . Rectal temp 98.6F. No bruises or bleeding noted. With strong hand grasp. Safety maintained. Bed lock in lowest position. Straight cath for UA and UC with initial drain of 200 ml of clear yellow urine. Made comfortable.

## 2025-05-24 LAB
CULTURE RESULTS: NO GROWTH — SIGNIFICANT CHANGE UP
SPECIMEN SOURCE: SIGNIFICANT CHANGE UP